# Patient Record
Sex: FEMALE | Race: WHITE | Employment: OTHER | URBAN - METROPOLITAN AREA
[De-identification: names, ages, dates, MRNs, and addresses within clinical notes are randomized per-mention and may not be internally consistent; named-entity substitution may affect disease eponyms.]

---

## 2017-02-22 ENCOUNTER — HOSPITAL ENCOUNTER (OUTPATIENT)
Dept: CT IMAGING | Age: 62
Discharge: HOME | End: 2017-02-22
Attending: FAMILY MEDICINE
Payer: MEDICARE

## 2017-02-22 DIAGNOSIS — R91.8 PULMONARY NODULES: ICD-10-CM

## 2017-02-22 PROCEDURE — 71250 CT THORAX DX C-: CPT

## 2017-02-22 NOTE — PROGRESS NOTES
CT scan was negative in terms of concern of cancer there is some tiny lung nodules they are unchanged    In the right middle lobe her lung is compressed, we could give her 1 of our [employer] devices that might help to open up that lung tissue; I have some of those in my cubby    Her liver and spleen are enlarged and that typically comes from liver disease we can discuss that further at her next visit; I do not think they mentioned it on the previous scans but the scan we are doing is of the lungs and not the stomach therefore they just commented on what part they could see; the main thing on her part right now would be to avoid alcohol which I think she does already

## 2017-05-08 PROBLEM — R16.2 HEPATOSPLENOMEGALY: Chronic | Status: ACTIVE | Noted: 2017-05-08

## 2017-05-10 ENCOUNTER — HOSPITAL ENCOUNTER (OUTPATIENT)
Dept: ULTRASOUND IMAGING | Age: 62
Discharge: HOME | End: 2017-05-10
Attending: FAMILY MEDICINE
Payer: MEDICARE

## 2017-05-10 DIAGNOSIS — R16.2 HEPATOSPLENOMEGALY: Chronic | ICD-10-CM

## 2017-05-10 DIAGNOSIS — D69.6 PLATELETS DECREASED (HCC): ICD-10-CM

## 2017-05-10 PROCEDURE — 76700 US EXAM ABDOM COMPLETE: CPT

## 2017-05-10 NOTE — PROGRESS NOTES
Her liver and spleen are mildly enlarged and her liver show signs of fatty liver so for right now no changes in her medication or other we will monitor

## 2017-11-09 PROBLEM — E11.9 CONTROLLED TYPE 2 DIABETES MELLITUS WITHOUT COMPLICATION, WITHOUT LONG-TERM CURRENT USE OF INSULIN (HCC): Status: ACTIVE | Noted: 2017-11-09

## 2018-02-12 PROBLEM — E11.40 TYPE 2 DIABETES MELLITUS WITH DIABETIC NEUROPATHY (HCC): Status: ACTIVE | Noted: 2018-02-12

## 2018-02-12 PROBLEM — E11.21 TYPE 2 DIABETES WITH NEPHROPATHY (HCC): Status: ACTIVE | Noted: 2018-02-12

## 2019-01-09 ENCOUNTER — HOSPITAL ENCOUNTER (OUTPATIENT)
Dept: MAMMOGRAPHY | Age: 64
Discharge: HOME | End: 2019-01-09
Attending: FAMILY MEDICINE
Payer: MEDICARE

## 2019-01-09 DIAGNOSIS — Z12.39 SCREENING BREAST EXAMINATION: ICD-10-CM

## 2019-01-09 PROCEDURE — 77067 SCR MAMMO BI INCL CAD: CPT

## 2021-07-15 ENCOUNTER — APPOINTMENT (OUTPATIENT)
Dept: CT IMAGING | Age: 66
DRG: 193 | End: 2021-07-15
Attending: EMERGENCY MEDICINE
Payer: MEDICARE

## 2021-07-15 ENCOUNTER — HOSPITAL ENCOUNTER (INPATIENT)
Age: 66
LOS: 2 days | Discharge: HOME HEALTH | DRG: 193 | End: 2021-07-17
Attending: EMERGENCY MEDICINE | Admitting: EMERGENCY MEDICINE
Payer: MEDICARE

## 2021-07-15 ENCOUNTER — APPOINTMENT (OUTPATIENT)
Dept: GENERAL RADIOLOGY | Age: 66
DRG: 193 | End: 2021-07-15
Attending: EMERGENCY MEDICINE
Payer: MEDICARE

## 2021-07-15 ENCOUNTER — APPOINTMENT (OUTPATIENT)
Dept: GENERAL RADIOLOGY | Age: 66
DRG: 193 | End: 2021-07-15
Attending: STUDENT IN AN ORGANIZED HEALTH CARE EDUCATION/TRAINING PROGRAM
Payer: MEDICARE

## 2021-07-15 DIAGNOSIS — J96.01 ACUTE RESPIRATORY FAILURE WITH HYPOXIA (HCC): Primary | ICD-10-CM

## 2021-07-15 DIAGNOSIS — R29.6 FREQUENT FALLS: ICD-10-CM

## 2021-07-15 DIAGNOSIS — J18.9 PNEUMONIA OF RIGHT LOWER LOBE DUE TO INFECTIOUS ORGANISM: ICD-10-CM

## 2021-07-15 DIAGNOSIS — E87.6 HYPOKALEMIA: ICD-10-CM

## 2021-07-15 DIAGNOSIS — T79.6XXA TRAUMATIC RHABDOMYOLYSIS, INITIAL ENCOUNTER (HCC): ICD-10-CM

## 2021-07-15 PROBLEM — M62.82 NON-TRAUMATIC RHABDOMYOLYSIS: Status: ACTIVE | Noted: 2021-07-15

## 2021-07-15 PROBLEM — R09.02 HYPOXIA: Status: ACTIVE | Noted: 2021-07-15

## 2021-07-15 PROBLEM — L03.115 BILATERAL LOWER LEG CELLULITIS: Chronic | Status: ACTIVE | Noted: 2021-07-15

## 2021-07-15 PROBLEM — L03.116 BILATERAL LOWER LEG CELLULITIS: Chronic | Status: ACTIVE | Noted: 2021-07-15

## 2021-07-15 LAB
ALBUMIN SERPL-MCNC: 2.7 G/DL (ref 3.2–4.6)
ALBUMIN/GLOB SERPL: 0.7 {RATIO} (ref 1.2–3.5)
ALP SERPL-CCNC: 128 U/L (ref 50–130)
ALT SERPL-CCNC: 36 U/L (ref 12–65)
ANION GAP SERPL CALC-SCNC: 7 MMOL/L (ref 7–16)
APPEARANCE UR: CLEAR
AST SERPL-CCNC: 69 U/L (ref 15–37)
BACTERIA URNS QL MICRO: 0 /HPF
BASOPHILS # BLD: 0 K/UL (ref 0–0.2)
BASOPHILS NFR BLD: 1 % (ref 0–2)
BILIRUB SERPL-MCNC: 1 MG/DL (ref 0.2–1.1)
BILIRUB UR QL: ABNORMAL
BUN SERPL-MCNC: 11 MG/DL (ref 8–23)
CALCIUM SERPL-MCNC: 8.7 MG/DL (ref 8.3–10.4)
CASTS URNS QL MICRO: ABNORMAL /LPF
CHLORIDE SERPL-SCNC: 104 MMOL/L (ref 98–107)
CK SERPL-CCNC: 890 U/L (ref 21–215)
CO2 SERPL-SCNC: 32 MMOL/L (ref 21–32)
COLOR UR: ABNORMAL
COVID-19 RAPID TEST, COVR: NOT DETECTED
CREAT SERPL-MCNC: 0.64 MG/DL (ref 0.6–1)
CRYSTALS URNS QL MICRO: ABNORMAL /LPF
DIFFERENTIAL METHOD BLD: ABNORMAL
EOSINOPHIL # BLD: 0.2 K/UL (ref 0–0.8)
EOSINOPHIL NFR BLD: 3 % (ref 0.5–7.8)
EPI CELLS #/AREA URNS HPF: ABNORMAL /HPF
ERYTHROCYTE [DISTWIDTH] IN BLOOD BY AUTOMATED COUNT: 19.9 % (ref 11.9–14.6)
EST. AVERAGE GLUCOSE BLD GHB EST-MCNC: 97 MG/DL
GLOBULIN SER CALC-MCNC: 3.8 G/DL (ref 2.3–3.5)
GLUCOSE SERPL-MCNC: 112 MG/DL (ref 65–100)
GLUCOSE UR STRIP.AUTO-MCNC: NEGATIVE MG/DL
HBA1C MFR BLD: 5 % (ref 4.2–6.3)
HCT VFR BLD AUTO: 36.5 % (ref 35.8–46.3)
HGB BLD-MCNC: 10.9 G/DL (ref 11.7–15.4)
HGB UR QL STRIP: NEGATIVE
IMM GRANULOCYTES # BLD AUTO: 0 K/UL (ref 0–0.5)
IMM GRANULOCYTES NFR BLD AUTO: 0 % (ref 0–5)
KETONES UR QL STRIP.AUTO: ABNORMAL MG/DL
LACTATE SERPL-SCNC: 1.6 MMOL/L (ref 0.4–2)
LACTATE SERPL-SCNC: 2.1 MMOL/L (ref 0.4–2)
LEUKOCYTE ESTERASE UR QL STRIP.AUTO: NEGATIVE
LYMPHOCYTES # BLD: 1 K/UL (ref 0.5–4.6)
LYMPHOCYTES NFR BLD: 19 % (ref 13–44)
MAGNESIUM SERPL-MCNC: 2.1 MG/DL (ref 1.8–2.4)
MCH RBC QN AUTO: 27 PG (ref 26.1–32.9)
MCHC RBC AUTO-ENTMCNC: 29.9 G/DL (ref 31.4–35)
MCV RBC AUTO: 90.3 FL (ref 79.6–97.8)
MONOCYTES # BLD: 0.5 K/UL (ref 0.1–1.3)
MONOCYTES NFR BLD: 9 % (ref 4–12)
NEUTS SEG # BLD: 3.6 K/UL (ref 1.7–8.2)
NEUTS SEG NFR BLD: 68 % (ref 43–78)
NITRITE UR QL STRIP.AUTO: NEGATIVE
NRBC # BLD: 0 K/UL (ref 0–0.2)
OTHER OBSERVATIONS,UCOM: ABNORMAL
PH UR STRIP: 6 [PH] (ref 5–9)
PLATELET # BLD AUTO: 174 K/UL (ref 150–450)
PMV BLD AUTO: 9.9 FL (ref 9.4–12.3)
POTASSIUM SERPL-SCNC: 2.9 MMOL/L (ref 3.5–5.1)
PROT SERPL-MCNC: 6.5 G/DL (ref 6.3–8.2)
PROT UR STRIP-MCNC: ABNORMAL MG/DL
RBC # BLD AUTO: 4.04 M/UL (ref 4.05–5.2)
RBC #/AREA URNS HPF: ABNORMAL /HPF
SARS-COV-2, COV2: NORMAL
SODIUM SERPL-SCNC: 143 MMOL/L (ref 136–145)
SOURCE, COVRS: NORMAL
SP GR UR REFRACTOMETRY: 1.03 (ref 1–1.02)
UROBILINOGEN UR QL STRIP.AUTO: 4 EU/DL (ref 0.2–1)
WBC # BLD AUTO: 5.3 K/UL (ref 4.3–11.1)
WBC URNS QL MICRO: ABNORMAL /HPF

## 2021-07-15 PROCEDURE — 72125 CT NECK SPINE W/O DYE: CPT

## 2021-07-15 PROCEDURE — 81003 URINALYSIS AUTO W/O SCOPE: CPT

## 2021-07-15 PROCEDURE — 74011000258 HC RX REV CODE- 258: Performed by: EMERGENCY MEDICINE

## 2021-07-15 PROCEDURE — 85025 COMPLETE CBC W/AUTO DIFF WBC: CPT

## 2021-07-15 PROCEDURE — 99285 EMERGENCY DEPT VISIT HI MDM: CPT

## 2021-07-15 PROCEDURE — 2709999900 HC NON-CHARGEABLE SUPPLY

## 2021-07-15 PROCEDURE — 96367 TX/PROPH/DG ADDL SEQ IV INF: CPT

## 2021-07-15 PROCEDURE — U0005 INFEC AGEN DETEC AMPLI PROBE: HCPCS

## 2021-07-15 PROCEDURE — 74011250637 HC RX REV CODE- 250/637: Performed by: EMERGENCY MEDICINE

## 2021-07-15 PROCEDURE — 93005 ELECTROCARDIOGRAM TRACING: CPT | Performed by: EMERGENCY MEDICINE

## 2021-07-15 PROCEDURE — 82550 ASSAY OF CK (CPK): CPT

## 2021-07-15 PROCEDURE — 80053 COMPREHEN METABOLIC PANEL: CPT

## 2021-07-15 PROCEDURE — 83735 ASSAY OF MAGNESIUM: CPT

## 2021-07-15 PROCEDURE — 74011250636 HC RX REV CODE- 250/636: Performed by: EMERGENCY MEDICINE

## 2021-07-15 PROCEDURE — 87040 BLOOD CULTURE FOR BACTERIA: CPT

## 2021-07-15 PROCEDURE — 96361 HYDRATE IV INFUSION ADD-ON: CPT

## 2021-07-15 PROCEDURE — 70450 CT HEAD/BRAIN W/O DYE: CPT

## 2021-07-15 PROCEDURE — 83036 HEMOGLOBIN GLYCOSYLATED A1C: CPT

## 2021-07-15 PROCEDURE — 96365 THER/PROPH/DIAG IV INF INIT: CPT

## 2021-07-15 PROCEDURE — 71046 X-RAY EXAM CHEST 2 VIEWS: CPT

## 2021-07-15 PROCEDURE — 87635 SARS-COV-2 COVID-19 AMP PRB: CPT

## 2021-07-15 PROCEDURE — 65270000029 HC RM PRIVATE

## 2021-07-15 PROCEDURE — 72220 X-RAY EXAM SACRUM TAILBONE: CPT

## 2021-07-15 PROCEDURE — 83605 ASSAY OF LACTIC ACID: CPT

## 2021-07-15 RX ORDER — SODIUM CHLORIDE 0.9 % (FLUSH) 0.9 %
5-40 SYRINGE (ML) INJECTION AS NEEDED
Status: DISCONTINUED | OUTPATIENT
Start: 2021-07-15 | End: 2021-07-17 | Stop reason: HOSPADM

## 2021-07-15 RX ORDER — POTASSIUM CHLORIDE 14.9 MG/ML
20 INJECTION INTRAVENOUS
Status: COMPLETED | OUTPATIENT
Start: 2021-07-16 | End: 2021-07-16

## 2021-07-15 RX ORDER — POTASSIUM CHLORIDE 20 MEQ/1
40 TABLET, EXTENDED RELEASE ORAL
Status: COMPLETED | OUTPATIENT
Start: 2021-07-15 | End: 2021-07-15

## 2021-07-15 RX ORDER — ALBUTEROL SULFATE 0.83 MG/ML
2.5 SOLUTION RESPIRATORY (INHALATION)
Status: DISCONTINUED | OUTPATIENT
Start: 2021-07-16 | End: 2021-07-16

## 2021-07-15 RX ORDER — SODIUM CHLORIDE 0.9 % (FLUSH) 0.9 %
5-40 SYRINGE (ML) INJECTION EVERY 8 HOURS
Status: DISCONTINUED | OUTPATIENT
Start: 2021-07-16 | End: 2021-07-17 | Stop reason: HOSPADM

## 2021-07-15 RX ORDER — GABAPENTIN 300 MG/1
600 CAPSULE ORAL
Status: DISCONTINUED | OUTPATIENT
Start: 2021-07-16 | End: 2021-07-17 | Stop reason: HOSPADM

## 2021-07-15 RX ORDER — POTASSIUM CHLORIDE 750 MG/1
10 TABLET, EXTENDED RELEASE ORAL DAILY
Status: DISCONTINUED | OUTPATIENT
Start: 2021-07-16 | End: 2021-07-17 | Stop reason: HOSPADM

## 2021-07-15 RX ORDER — SODIUM CHLORIDE 9 MG/ML
100 INJECTION, SOLUTION INTRAVENOUS CONTINUOUS
Status: DISPENSED | OUTPATIENT
Start: 2021-07-16 | End: 2021-07-16

## 2021-07-15 RX ORDER — FERROUS SULFATE, DRIED 160(50) MG
1 TABLET, EXTENDED RELEASE ORAL DAILY
Status: DISCONTINUED | OUTPATIENT
Start: 2021-07-16 | End: 2021-07-17 | Stop reason: HOSPADM

## 2021-07-15 RX ORDER — ALPRAZOLAM 0.5 MG/1
2 TABLET ORAL
Status: DISCONTINUED | OUTPATIENT
Start: 2021-07-15 | End: 2021-07-17 | Stop reason: HOSPADM

## 2021-07-15 RX ORDER — INSULIN LISPRO 100 [IU]/ML
INJECTION, SOLUTION INTRAVENOUS; SUBCUTANEOUS
Status: DISCONTINUED | OUTPATIENT
Start: 2021-07-16 | End: 2021-07-17 | Stop reason: HOSPADM

## 2021-07-15 RX ORDER — ENOXAPARIN SODIUM 100 MG/ML
40 INJECTION SUBCUTANEOUS EVERY 12 HOURS
Status: DISCONTINUED | OUTPATIENT
Start: 2021-07-16 | End: 2021-07-17 | Stop reason: HOSPADM

## 2021-07-15 RX ORDER — LATANOPROST 50 UG/ML
1 SOLUTION/ DROPS OPHTHALMIC EVERY EVENING
Status: DISCONTINUED | OUTPATIENT
Start: 2021-07-16 | End: 2021-07-17 | Stop reason: HOSPADM

## 2021-07-15 RX ORDER — DULOXETIN HYDROCHLORIDE 60 MG/1
60 CAPSULE, DELAYED RELEASE ORAL DAILY
Status: DISCONTINUED | OUTPATIENT
Start: 2021-07-16 | End: 2021-07-17 | Stop reason: HOSPADM

## 2021-07-15 RX ORDER — BUPROPION HYDROCHLORIDE 150 MG/1
150 TABLET, EXTENDED RELEASE ORAL 2 TIMES DAILY
Status: DISCONTINUED | OUTPATIENT
Start: 2021-07-16 | End: 2021-07-17 | Stop reason: HOSPADM

## 2021-07-15 RX ORDER — ASPIRIN 81 MG/1
81 TABLET ORAL DAILY
Status: DISCONTINUED | OUTPATIENT
Start: 2021-07-16 | End: 2021-07-17 | Stop reason: HOSPADM

## 2021-07-15 RX ORDER — PREDNISONE 50 MG/1
50 TABLET ORAL DAILY
Status: DISCONTINUED | OUTPATIENT
Start: 2021-07-16 | End: 2021-07-16

## 2021-07-15 RX ORDER — HYDROCODONE BITARTRATE AND ACETAMINOPHEN 10; 325 MG/1; MG/1
2 TABLET ORAL 3 TIMES DAILY
Status: DISCONTINUED | OUTPATIENT
Start: 2021-07-16 | End: 2021-07-17 | Stop reason: HOSPADM

## 2021-07-15 RX ORDER — LANOLIN ALCOHOL/MO/W.PET/CERES
400 CREAM (GRAM) TOPICAL DAILY
Status: DISCONTINUED | OUTPATIENT
Start: 2021-07-16 | End: 2021-07-17 | Stop reason: HOSPADM

## 2021-07-15 RX ORDER — ZIPRASIDONE HYDROCHLORIDE 20 MG/1
40 CAPSULE ORAL
Status: DISCONTINUED | OUTPATIENT
Start: 2021-07-16 | End: 2021-07-17 | Stop reason: HOSPADM

## 2021-07-15 RX ORDER — PANTOPRAZOLE SODIUM 40 MG/1
40 TABLET, DELAYED RELEASE ORAL
Status: DISCONTINUED | OUTPATIENT
Start: 2021-07-16 | End: 2021-07-17 | Stop reason: HOSPADM

## 2021-07-15 RX ORDER — ALBUTEROL SULFATE 0.83 MG/ML
2.5 SOLUTION RESPIRATORY (INHALATION)
Status: DISCONTINUED | OUTPATIENT
Start: 2021-07-16 | End: 2021-07-17 | Stop reason: HOSPADM

## 2021-07-15 RX ADMIN — SODIUM CHLORIDE 1000 ML: 900 INJECTION, SOLUTION INTRAVENOUS at 18:52

## 2021-07-15 RX ADMIN — ATORVASTATIN CALCIUM: 10 TABLET, FILM COATED ORAL at 23:49

## 2021-07-15 RX ADMIN — GABAPENTIN 600 MG: 300 CAPSULE ORAL at 23:49

## 2021-07-15 RX ADMIN — POTASSIUM CHLORIDE 40 MEQ: 1500 TABLET, EXTENDED RELEASE ORAL at 19:25

## 2021-07-15 RX ADMIN — Medication 10 ML: at 23:50

## 2021-07-15 RX ADMIN — SODIUM CHLORIDE 100 ML/HR: 900 INJECTION, SOLUTION INTRAVENOUS at 23:50

## 2021-07-15 RX ADMIN — POTASSIUM CHLORIDE 20 MEQ: 14.9 INJECTION, SOLUTION INTRAVENOUS at 23:51

## 2021-07-15 RX ADMIN — HYDROCODONE BITARTRATE AND ACETAMINOPHEN 2 TABLET: 10; 325 TABLET ORAL at 23:49

## 2021-07-15 RX ADMIN — CEFEPIME HYDROCHLORIDE 1 G: 1 INJECTION, POWDER, FOR SOLUTION INTRAMUSCULAR; INTRAVENOUS at 21:11

## 2021-07-15 RX ADMIN — AZITHROMYCIN MONOHYDRATE 500 MG: 500 INJECTION, POWDER, LYOPHILIZED, FOR SOLUTION INTRAVENOUS at 18:52

## 2021-07-15 NOTE — ED PROVIDER NOTES
[address] Marek Sheth is a 77 y.o. female seen on 7/16/2021 in the [address] in room 359/01. Chief Complaint   Patient presents with    Fatigue    Fall     HPI: 67-year-old  female presented emergency department via EMS after sustaining a fall today. Patient has been having multiple falls recently. Today she states that she was in the kitchen when her legs just got weak and she fell to the ground. She states that her only injury from her fall was that she has pain in her buttocks. Patient was feeling weak however and she could not get herself up. Patient laid on the ground for approximately 8 hours prior to being found on the ground and helped up. Per EMS, patient was hypoxic as well. Patient wears 1.5 L of oxygen supplementation at nighttime only. She does have a history of COPD. Patient was hypoxic on her normal 1.5 L of oxygen upon arrival at 87%. Patient requiring 3 L of oxygen in the emergency department. She complains of mild shortness of breath and cough. She denies any fevers, chills, abdominal pain, chest pain, changes in bowel or bladder habits. She states that she just feels weak and she falls. She does not get lightheaded or dizzy. She does not have any focal weakness. She complains of generalized weakness. There is no other complaints at this time. Patient's family believes that patient's unsteady gait is likely secondary to being overmedicated with opiates. Historian: Patient    REVIEW OF SYSTEMS     Review of Systems   Constitutional: Positive for fatigue. HENT: Negative. Respiratory: Positive for cough and shortness of breath. Cardiovascular: Negative. Gastrointestinal: Negative. Genitourinary: Negative. Musculoskeletal: Positive for back pain and gait problem. Skin: Negative. Neurological: Positive for weakness (Generalized ). Psychiatric/Behavioral: Negative.     All other systems reviewed and are negative. PAST MEDICAL HISTORY     Past Medical History:   Diagnosis Date    Abnormal glucose level     Anxiety     Back pain     Chronic pain disorder     COPD (chronic obstructive pulmonary disease) (HCC)     Depression     Elevated blood sugar     Elevated IOP     Essential hypertension, benign     Fatigue     Fibromyalgia     Fracture of distal fibula     GERD (gastroesophageal reflux disease)     Ground glass opacity present on imaging of lung     Hyperlipidemia     Hypokalemia     Menopausal problem     Metabolic syndrome     Nausea     Obesity     Osteopenia     URI (upper respiratory infection)      Past Surgical History:   Procedure Laterality Date    COLONOSCOPY N/A 10/13/2016    COLONOSCOPY performed by [facility]ika [facility] at [facility] ENDOSCOPY    HX CHOLECYSTECTOMY      HX HYSTERECTOMY      HX TONSILLECTOMY       Social History     Socioeconomic History    Marital status:      Spouse name: Not on file    Number of children: Not on file    Years of education: Not on file    Highest education level: Not on file   Tobacco Use    Smoking status: Former Smoker     Packs/day: 3.00     Years: 40.00     Pack years: 120.00     Quit date: 2015     Years since quittin.5    Smokeless tobacco: Never Used   Substance and Sexual Activity    Alcohol use: No    Drug use: No    Sexual activity: Not Currently     Social Determinants of Health     Financial Resource Strain:     Difficulty of Paying Living Expenses:    Food Insecurity:     Worried About Running Out of Food in the Last Year:     Ran Out of Food in the Last Year:    Transportation Needs:     Lack of Transportation (Medical):      Lack of Transportation (Non-Medical):    Physical Activity:     Days of Exercise per Week:     Minutes of Exercise per Session:    Stress:     Feeling of Stress :    Social Connections:     Frequency of Communication with Friends and Family:     Frequency of Social Gatherings with Friends and Family:     Attends [employer] Services:     Active Member of Clubs or Organizations:     Attends Club or Organization Meetings:     Marital Status:      Prior to Admission Medications   Prescriptions Last Dose Informant Patient Reported? Taking? ALPRAZolam (XANAX) 2 mg tablet   No No   Sig: Take 1 Tab by mouth two (2) times daily as needed for Anxiety. Max Daily Amount: 4 mg. Blood-Glucose Meter (ACCU-CHEK LASHELL PLUS METER) misc   No No   Sig: Use meter two times daily to check blood sugars. DX: E11.9 Diabetes   DULoxetine (CYMBALTA) 60 mg capsule   No No   Sig: Take 1 Cap by mouth daily. Take 1 cap PO at bedtime    Indications: FIBROMYALGIA   HYDROcodone-acetaminophen (NORCO)  mg tablet   No No   Sig: Take 2 Tabs by mouth three (3) times daily. Max Daily Amount: 6 Tabs. Lancets (ACCU-CHEK SOFTCLIX LANCETS) misc   No No   Si lancet two times daily. DX: E11.9 Diabetes   albuterol (VENTOLIN HFA) 90 mcg/actuation inhaler   No No   Sig: Take 2 Puffs by inhalation every four (4) hours as needed for Wheezing. amoxicillin-clavulanate (AUGMENTIN) 875-125 mg per tablet   No No   Sig: Take 1 Tab by mouth every twelve (12) hours. aspirin delayed-release 81 mg tablet   Yes No   Sig: Take 81 mg by mouth daily. buPROPion SR (WELLBUTRIN SR) 150 mg SR tablet   No No   Sig: Take 1 Tab by mouth two (2) times a day. calcium citrate-vitamin D3 (CITRACAL + D) tablet   Yes No   Sig: Take 2 Tabs by mouth nightly.   ezetimibe-simvastatin (VYTORIN 10/20) 10-20 mg per tablet   No No   Sig: Take 1 Tab by mouth nightly. Indications: to lower cholesterol   gabapentin (NEURONTIN) 600 mg tablet   No No   Sig: Take 1 Tab by mouth nightly. Indications: NEUROPATHIC PAIN   glucose blood VI test strips (ACCU-CHEK LASHELL PLUS TEST STRP) strip   No No   Si strip two times daily.  DX E11.9 Diabetes   losartan-hydroCHLOROthiazide (HYZAAR) 50-12.5 mg per tablet   No No   Sig: Take 1 Tab by mouth daily. Indications: hypertension, To lower blood pressure / Fluid pill   magnesium oxide 500 mg tab   Yes No   Sig: Take  by mouth.   miSOPROStol (CYTOTEC) 200 mcg tablet   No No   Sig: Take 1 Tab by mouth two (2) times a day. To coat stomach.   mv,Ca,min-folic acid-vit K1 (ONE-A-DAY WOMEN'S 50 PLUS) 400-20 mcg tab   No No   Sig: Take 1 Tab by mouth daily. naloxone (NARCAN) 4 mg/actuation nasal spray   No No   Si [place] by IntraNASal route once as needed for up to 2 doses. As directed; may repeat every 2-3 mins until ems arrives or patient responds  Indications: OPIATE-INDUCED RESPIRATORY DEPRESSION, If stops breathing from too much pain meds   omeprazole (PRILOSEC) 40 mg capsule   No No   Sig: Take 1 Cap by mouth daily. 30 min before breakfast  Indications: HEARTBURN, To lower stomach acid   potassium chloride (KLOR-CON M10) 10 mEq tablet   No No   Sig: Take 1 Tab by mouth daily. promethazine (PHENERGAN) 25 mg tablet   No No   Sig: Take 1 Tab by mouth every six (6) hours as needed for Nausea. travoprost (TRAVATAN Z) 0.004 % ophthalmic solution   Yes No   Sig: Administer 1 Drop to both eyes every evening. umeclidinium-vilanterol (ANORO ELLIPTA) 62.5-25 mcg/actuation inhaler   No No   Sig: Take 1 Puff by inhalation daily. ziprasidone (GEODON) 20 mg capsule   No No   Sig: Take 2 Caps by mouth nightly. Indications: Depression      Facility-Administered Medications: None     No Known Allergies     PHYSICAL EXAM       Vitals:    07/15/21 2030 07/15/21 2045 07/15/21 2100 07/15/21 2255   BP: (!) 153/72 (!) 148/70 (!) 154/68 (!) 158/68   Pulse: 85 81 80 83   Resp: 15 13  14   Temp:    99 °F (37.2 °C)   SpO2: 96% 94% 95% 96%    Vital signs were reviewed. Physical Exam  Vitals and nursing note reviewed. Constitutional:       General: She is not in acute distress. Appearance: She is not toxic-appearing. HENT:      Head: Normocephalic and atraumatic.       Mouth/Throat:      Mouth: Mucous membranes are dry.   Eyes:      Extraocular Movements: Extraocular movements intact. Pupils: Pupils are equal, round, and reactive to light. Cardiovascular:      Rate and Rhythm: Normal rate and regular rhythm. Pulses: Normal pulses. Heart sounds: Normal heart sounds. Pulmonary:      Breath sounds: Wheezing and rhonchi present. Abdominal:      Palpations: Abdomen is soft. Tenderness: There is no abdominal tenderness. There is no guarding or rebound. Musculoskeletal:         General: Normal range of motion. Cervical back: Normal range of motion. Comments: Tenderness palpation over the coccyx   Skin:     General: Skin is warm and dry. Neurological:      General: No focal deficit present. Mental Status: She is alert and oriented to person, place, and time. Psychiatric:         Mood and Affect: Mood normal.         Behavior: Behavior normal.         Thought Content:  Thought content normal.         Judgment: Judgment normal.          MEDICAL DECISION MAKING     ED Course:    Orders Placed This Encounter    CULTURE, BLOOD    COVID-19 RAPID TEST    SARS-COV-2, PCR    XR CHEST PA LAT    CT HEAD WO CONT    CT SPINE CERV WO CONT    XR SACRUM AND COCCYX    CBC WITH AUTOMATED DIFF    METABOLIC PANEL,COMP.    CK    URINALYSIS    LACTIC ACID    LACTIC ACID 2 HOUR REPEAT    MAGNESIUM    HEMOGLOBIN A1C WITH EAG    METABOLIC PANEL, BASIC    CBC W/ AUTOMATED DIFF    SARS-COV-2    ADULT DIET Regular; Low Fat/Low Chol/High Fiber/NGA; GI [place] (GERD/Peptic Ulcer)    INITIATE: SPECIFY Initiate Presenting Complaint Protocol ONE TIME Routine    STRAIGHT CATHETER (NURSING) ONE TIME STAT    POC GLUCOSE    VITAL SIGNS PER UNIT ROUTINE    AMBULATE WITH ASSISTANCE    ELEVATE HEAD OF BED    NOTIFY PROVIDER: VITAL SIGNS CHANGES    INTAKE AND OUTPUT    SMOKING CESSATION EDUCATION    FULL CODE    OXIMETRY, SPOT CHECK    EKG, 12 LEAD, INITIAL    EKG, 12 LEAD, INITIAL    INSERT PERIPHERAL IV ONE TIME STAT    azithromycin (ZITHROMAX) 500 mg in 0.9% sodium chloride 250 mL (VIAL-MATE)    cefepime (MAXIPIME) 1 g in 0.9% sodium chloride (MBP/ADV) 50 mL MBP    sodium chloride 0.9 % bolus infusion 1,000 mL    potassium chloride (K-DUR, KLOR-CON) SR tablet 40 mEq    ALPRAZolam (XANAX) tablet 2 mg    aspirin delayed-release tablet 81 mg    buPROPion SR (WELLBUTRIN SR) tablet 150 mg    . PHARMACY TO SUBSTITUTE PER PROTOCOL (Reordered from: calcium citrate-vitamin D3 (CITRACAL + D) tablet)    DULoxetine (CYMBALTA) capsule 60 mg    ezetimibe/atorvastatin 10/10 mg    gabapentin (NEURONTIN) capsule 600 mg    HYDROcodone-acetaminophen (NORCO)  mg tablet 2 Tablet    DISCONTD: . PHARMACY TO SUBSTITUTE PER PROTOCOL (Reordered from: losartan-hydroCHLOROthiazide (HYZAAR) 50-12.5 mg per tablet)    magnesium oxide (MAG-OX) tablet 400 mg    multivitamin, tx-iron-ca-min (THERA-M w/ IRON) tablet 1 Tablet    pantoprazole (PROTONIX) tablet 40 mg    potassium chloride (KLOR-CON) tablet 10 mEq    latanoprost (XALATAN) 0.005 % ophthalmic solution 1 Drop    tiotropium-olodateroL (STIOLTO RESPIMAT) 2.5-2.5 mcg/actuation inhaler 2 Puff    ziprasidone (GEODON) capsule 40 mg    sodium chloride (NS) flush 5-40 mL    sodium chloride (NS) flush 5-40 mL    insulin lispro (HUMALOG) injection    potassium chloride 20 mEq in 100 ml IVPB    azithromycin (ZITHROMAX) 500 mg in 0.9% sodium chloride 250 mL (VIAL-MATE)    cefTRIAXone (ROCEPHIN) 1 g in 0.9% sodium chloride (MBP/ADV) 50 mL MBP    albuterol (PROVENTIL VENTOLIN) nebulizer solution 2.5 mg    albuterol (PROVENTIL VENTOLIN) nebulizer solution 2.5 mg    predniSONE (DELTASONE) tablet 50 mg    enoxaparin (LOVENOX) injection 40 mg    0.9% sodium chloride infusion    losartan/hydroCHLOROthiazide (HYZAAR) 50/12.5 mg    INITIAL PHYSICIAN ORDER: INPATIENT Medical; No; 3.  Patient receiving treatment that can only be provided in an inpatient setting (further clarification in H&P documentation)    IP CONSULT TO WOUND CARE     Recent Results (from the past 8 hour(s))   CBC WITH AUTOMATED DIFF    Collection Time: 07/15/21  6:10 PM   Result Value Ref Range    WBC 5.3 4.3 - 11.1 K/uL    RBC 4.04 (L) 4.05 - 5.2 M/uL    HGB 10.9 (L) 11.7 - 15.4 g/dL    HCT 36.5 35.8 - 46.3 %    MCV 90.3 79.6 - 97.8 FL    MCH 27.0 26.1 - 32.9 PG    MCHC 29.9 (L) 31.4 - 35.0 g/dL    RDW 19.9 (H) 11.9 - 14.6 %    PLATELET 160 031 - 496 K/uL    MPV 9.9 9.4 - 12.3 FL    ABSOLUTE NRBC 0.00 0.0 - 0.2 K/uL    DF AUTOMATED      NEUTROPHILS 68 43 - 78 %    LYMPHOCYTES 19 13 - 44 %    MONOCYTES 9 4.0 - 12.0 %    EOSINOPHILS 3 0.5 - 7.8 %    BASOPHILS 1 0.0 - 2.0 %    IMMATURE GRANULOCYTES 0 0.0 - 5.0 %    ABS. NEUTROPHILS 3.6 1.7 - 8.2 K/UL    ABS. LYMPHOCYTES 1.0 0.5 - 4.6 K/UL    ABS. MONOCYTES 0.5 0.1 - 1.3 K/UL    ABS. EOSINOPHILS 0.2 0.0 - 0.8 K/UL    ABS. BASOPHILS 0.0 0.0 - 0.2 K/UL    ABS. IMM. GRANS. 0.0 0.0 - 0.5 K/UL   METABOLIC PANEL, COMPREHENSIVE    Collection Time: 07/15/21  6:10 PM   Result Value Ref Range    Sodium 143 136 - 145 mmol/L    Potassium 2.9 (L) 3.5 - 5.1 mmol/L    Chloride 104 98 - 107 mmol/L    CO2 32 21 - 32 mmol/L    Anion gap 7 7 - 16 mmol/L    Glucose 112 (H) 65 - 100 mg/dL    BUN 11 8 - 23 MG/DL    Creatinine 0.64 0.6 - 1.0 MG/DL    GFR est AA >60 >60 ml/min/1.73m2    GFR est non-AA >60 >60 ml/min/1.73m2    Calcium 8.7 8.3 - 10.4 MG/DL    Bilirubin, total 1.0 0.2 - 1.1 MG/DL    ALT (SGPT) 36 12 - 65 U/L    AST (SGOT) 69 (H) 15 - 37 U/L    Alk.  phosphatase 128 50 - 130 U/L    Protein, total 6.5 6.3 - 8.2 g/dL    Albumin 2.7 (L) 3.2 - 4.6 g/dL    Globulin 3.8 (H) 2.3 - 3.5 g/dL    A-G Ratio 0.7 (L) 1.2 - 3.5     CK    Collection Time: 07/15/21  6:10 PM   Result Value Ref Range     (H) 21 - 215 U/L   LACTIC ACID    Collection Time: 07/15/21  6:10 PM   Result Value Ref Range    Lactic acid 2.1 (H) 0.4 - 2.0 MMOL/L   MAGNESIUM    Collection Time: 07/15/21 6:10 PM   Result Value Ref Range    Magnesium 2.1 1.8 - 2.4 mg/dL   HEMOGLOBIN A1C WITH EAG    Collection Time: 07/15/21  6:10 PM   Result Value Ref Range    Hemoglobin A1c 5.0 4.2 - 6.3 %    Est. average glucose 97 mg/dL   URINALYSIS W/ RFLX MICROSCOPIC    Collection Time: 07/15/21  7:40 PM   Result Value Ref Range    Color KYLE      Appearance CLEAR      Specific gravity 1.029 (H) 1.001 - 1.023      pH (UA) 6.0 5.0 - 9.0      Protein TRACE (A) NEG mg/dL    Glucose Negative mg/dL    Ketone TRACE (A) NEG mg/dL    Bilirubin SMALL (A) NEG      Blood Negative NEG      Urobilinogen 4.0 (H) 0.2 - 1.0 EU/dL    Nitrites Negative NEG      Leukocyte Esterase Negative NEG      WBC 0-3 0 /hpf    RBC 0-3 0 /hpf    Epithelial cells 0-3 0 /hpf    Bacteria 0 0 /hpf    Casts 5-10 0 /lpf    Crystals, urine MARKED 0 /LPF    Other observations RESULTS VERIFIED MANUALLY     LACTIC ACID    Collection Time: 07/15/21  7:40 PM   Result Value Ref Range    Lactic acid 1.6 0.4 - 2.0 MMOL/L   SARS-COV-2    Collection Time: 07/15/21  9:34 PM   Result Value Ref Range    SARS-CoV-2 Please find results under separate order     COVID-19 RAPID TEST    Collection Time: 07/15/21  9:34 PM   Result Value Ref Range    Specimen source Nasopharyngeal      COVID-19 rapid test Not detected NOTD       XR CHEST PA LAT    Result Date: 7/15/2021  EXAM: CHEST X-RAY, 2 VIEWS INDICATION: hypoxia COMPARISON: Chest CT 4/12/2016 TECHNIQUE: Frontal and lateral views of the chest were obtained. FINDINGS: There is a vague opacity at the right cardiophrenic angle. No pneumothorax or pleural effusion. The cardiomediastinal silhouette is within normal limits. Multilevel degenerative changes of the spine. A vague opacity at the right cardiophrenic angle, which which could reflect airspace disease such as atelectasis/infiltrate or proliferation of cardiophrenic fat. Consider CT.      XR SACRUM AND COCCYX    Result Date: 7/15/2021  Sacrum and coccyx radiographs, 3 views INDICATION: Sacral pain following injury COMPARISON: None. FINDINGS: No acute fracture is clearly discerned. The pelvic ring appears intact, where imaged. The sacroiliac joints are symmetric and demonstrate osteoarthritic changes. Degenerative changes of the lower lumbar spine. Arteriosclerotic calcifications. No acute radiographic abnormality within the limitations imposed by the patient's body habitus and osseous demineralization. CT HEAD WO CONT    Result Date: 7/15/2021  NONCONTRAST HEAD CT CLINICAL HISTORY:  Weakness and somnolence after fall this morning. TECHNIQUE:  Axial images were obtained with spiral technique. Radiation dose reduction was achieved using one or all of the following techniques: automated exposure control, weight-based dosing, iterative reconstruction. COMPARISON:  None. REPORT:   Standard noncontrast head CT demonstrates no definite intracranial mass effect, hemorrhage, or evidence of acute geographic infarction. The ventricles are normal in size and configuration, accounting for the patient's age. Orbits  and paranasal sinuses are clear where imaged. Bone windows demonstrate no definite fracture or destruction. NO ACUTE INTRACRANIAL ABNORMALITY OR CALVARIAL FRACTURE IDENTIFIED AT NONCONTRAST CT.     CT SPINE CERV WO CONT    Result Date: 7/15/2021  CT CERVICAL SPINE INDICATION: Repetitive falls COMPARISON: None. TECHNIQUE: Contiguous axial images from the skull base through the superior mediastinum were obtained with coronal and sagittal reformations. Radiation dose reduction techniques were used for this study. Our CT scanners use one or all of the following: Automated exposure control, adjustment of the mA and/or kV according to patient size, iterative reconstruction. 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 No acute fracture is clearly identified. Cervical spine alignment is normal. The craniocervical junction is intact.  The lateral masses are aligned. Vertebral body heights are preserved. Multilevel degenerative changes of the spine. Visualized soft tissues are unremarkable. No acute traumatic abnormality of the cervical spine. MDM  Number of Diagnoses or Management Options  Acute respiratory failure with hypoxia (HCC)  Frequent falls  Hypokalemia  Pneumonia of right lower lobe due to infectious organism  Traumatic rhabdomyolysis, initial encounter [facility])  Diagnosis management comments: 38-year-old female presented emergency department via EMS after being found on the ground after a fall earlier today. Patient was hypoxic despite her normal home oxygen supplementation. Patient's chest x-ray revealed right lower lobe infiltrate. Patient also noted to be hypokalemic. Patient CPK was elevated consistent with mild early rhabdomyolysis however patient's kidney function is preserved currently. Patient given IV Rocephin and azithromycin for community-acquired pneumonia. Patient will be admitted to the hospital for further treatment evaluation. Amount and/or Complexity of Data Reviewed  Clinical lab tests: ordered and reviewed  Tests in the radiology section of CPT®: ordered and reviewed  Decide to obtain previous medical records or to obtain history from someone other than the patient: yes  Obtain history from someone other than the patient: yes  Review and summarize past medical records: yes  Discuss the patient with other providers: yes  Independent visualization of images, tracings, or specimens: yes    Patient Progress  Patient progress: stable        Disposition: Admitted  Diagnosis:     ICD-10-CM ICD-9-CM   1. Acute respiratory failure with hypoxia (HCC)  J96.01 518.81   2. Pneumonia of right lower lobe due to infectious organism  J18.9 486   3. Frequent falls  R29.6 V15.88   4. [facility]  T79. 6XXA 958.6   5.  Hypokalemia  E87.6 276.8 ____________________________________________________________________  A portion of this note was generated using voice recognition dictation software. While the note has been reviewed for accuracy, please note certain words and phrases may not be transcribed as intended and some grammatical and/or typographical errors may be present.

## 2021-07-15 NOTE — ED TRIAGE NOTES
Patient comes via ems from home, pt fell in the morning around 8am and was unable to get herself up until around 4pm.  Patient co weakness, denies any neck pain denies loc

## 2021-07-16 ENCOUNTER — APPOINTMENT (OUTPATIENT)
Dept: ULTRASOUND IMAGING | Age: 66
DRG: 193 | End: 2021-07-16
Attending: INTERNAL MEDICINE
Payer: MEDICARE

## 2021-07-16 LAB
ANION GAP SERPL CALC-SCNC: 1 MMOL/L (ref 7–16)
BASOPHILS # BLD: 0 K/UL (ref 0–0.2)
BASOPHILS NFR BLD: 1 % (ref 0–2)
BUN SERPL-MCNC: 8 MG/DL (ref 8–23)
CALCIUM SERPL-MCNC: 8.5 MG/DL (ref 8.3–10.4)
CHLORIDE SERPL-SCNC: 110 MMOL/L (ref 98–107)
CO2 SERPL-SCNC: 35 MMOL/L (ref 21–32)
CREAT SERPL-MCNC: 0.44 MG/DL (ref 0.6–1)
DIFFERENTIAL METHOD BLD: ABNORMAL
EOSINOPHIL # BLD: 0.2 K/UL (ref 0–0.8)
EOSINOPHIL NFR BLD: 5 % (ref 0.5–7.8)
ERYTHROCYTE [DISTWIDTH] IN BLOOD BY AUTOMATED COUNT: 19.9 % (ref 11.9–14.6)
GLUCOSE BLD STRIP.AUTO-MCNC: 107 MG/DL (ref 65–100)
GLUCOSE BLD STRIP.AUTO-MCNC: 113 MG/DL (ref 65–100)
GLUCOSE BLD STRIP.AUTO-MCNC: 80 MG/DL (ref 65–100)
GLUCOSE BLD STRIP.AUTO-MCNC: 99 MG/DL (ref 65–100)
GLUCOSE SERPL-MCNC: 77 MG/DL (ref 65–100)
HCT VFR BLD AUTO: 34.7 % (ref 35.8–46.3)
HGB BLD-MCNC: 10.2 G/DL (ref 11.7–15.4)
IMM GRANULOCYTES # BLD AUTO: 0 K/UL (ref 0–0.5)
IMM GRANULOCYTES NFR BLD AUTO: 0 % (ref 0–5)
LYMPHOCYTES # BLD: 1.1 K/UL (ref 0.5–4.6)
LYMPHOCYTES NFR BLD: 27 % (ref 13–44)
MCH RBC QN AUTO: 26.6 PG (ref 26.1–32.9)
MCHC RBC AUTO-ENTMCNC: 29.4 G/DL (ref 31.4–35)
MCV RBC AUTO: 90.6 FL (ref 79.6–97.8)
MONOCYTES # BLD: 0.4 K/UL (ref 0.1–1.3)
MONOCYTES NFR BLD: 10 % (ref 4–12)
NEUTS SEG # BLD: 2.2 K/UL (ref 1.7–8.2)
NEUTS SEG NFR BLD: 57 % (ref 43–78)
NRBC # BLD: 0 K/UL (ref 0–0.2)
PLATELET # BLD AUTO: 156 K/UL (ref 150–450)
PMV BLD AUTO: 10.1 FL (ref 9.4–12.3)
POTASSIUM SERPL-SCNC: 3.5 MMOL/L (ref 3.5–5.1)
RBC # BLD AUTO: 3.83 M/UL (ref 4.05–5.2)
SARS-COV-2, COV2: NOT DETECTED
SERVICE CMNT-IMP: ABNORMAL
SERVICE CMNT-IMP: ABNORMAL
SERVICE CMNT-IMP: NORMAL
SERVICE CMNT-IMP: NORMAL
SODIUM SERPL-SCNC: 146 MMOL/L (ref 136–145)
SPECIMEN SOURCE, FCOV2M: NORMAL
WBC # BLD AUTO: 3.9 K/UL (ref 4.3–11.1)

## 2021-07-16 PROCEDURE — 80048 BASIC METABOLIC PNL TOTAL CA: CPT

## 2021-07-16 PROCEDURE — 74011250636 HC RX REV CODE- 250/636: Performed by: EMERGENCY MEDICINE

## 2021-07-16 PROCEDURE — 94760 N-INVAS EAR/PLS OXIMETRY 1: CPT

## 2021-07-16 PROCEDURE — 93970 EXTREMITY STUDY: CPT

## 2021-07-16 PROCEDURE — 94664 DEMO&/EVAL PT USE INHALER: CPT

## 2021-07-16 PROCEDURE — 97161 PT EVAL LOW COMPLEX 20 MIN: CPT

## 2021-07-16 PROCEDURE — 74011250637 HC RX REV CODE- 250/637: Performed by: EMERGENCY MEDICINE

## 2021-07-16 PROCEDURE — 74011000250 HC RX REV CODE- 250: Performed by: EMERGENCY MEDICINE

## 2021-07-16 PROCEDURE — 36415 COLL VENOUS BLD VENIPUNCTURE: CPT

## 2021-07-16 PROCEDURE — 2709999900 HC NON-CHARGEABLE SUPPLY

## 2021-07-16 PROCEDURE — 65270000029 HC RM PRIVATE

## 2021-07-16 PROCEDURE — 94640 AIRWAY INHALATION TREATMENT: CPT

## 2021-07-16 PROCEDURE — 82962 GLUCOSE BLOOD TEST: CPT

## 2021-07-16 PROCEDURE — 77010033678 HC OXYGEN DAILY

## 2021-07-16 PROCEDURE — 74011000258 HC RX REV CODE- 258: Performed by: EMERGENCY MEDICINE

## 2021-07-16 PROCEDURE — 97165 OT EVAL LOW COMPLEX 30 MIN: CPT

## 2021-07-16 PROCEDURE — 85025 COMPLETE CBC W/AUTO DIFF WBC: CPT

## 2021-07-16 RX ORDER — ALBUTEROL SULFATE 0.83 MG/ML
2.5 SOLUTION RESPIRATORY (INHALATION)
Status: DISCONTINUED | OUTPATIENT
Start: 2021-07-16 | End: 2021-07-16

## 2021-07-16 RX ORDER — PREDNISONE 20 MG/1
40 TABLET ORAL DAILY
Status: DISCONTINUED | OUTPATIENT
Start: 2021-07-17 | End: 2021-07-17 | Stop reason: HOSPADM

## 2021-07-16 RX ORDER — ALBUTEROL SULFATE 0.83 MG/ML
2.5 SOLUTION RESPIRATORY (INHALATION)
Status: DISCONTINUED | OUTPATIENT
Start: 2021-07-16 | End: 2021-07-17 | Stop reason: HOSPADM

## 2021-07-16 RX ADMIN — Medication 1 TABLET: at 09:09

## 2021-07-16 RX ADMIN — Medication 10 ML: at 22:20

## 2021-07-16 RX ADMIN — POTASSIUM CHLORIDE 10 MEQ: 10 TABLET, EXTENDED RELEASE ORAL at 09:01

## 2021-07-16 RX ADMIN — ALBUTEROL SULFATE 2.5 MG: 2.5 SOLUTION RESPIRATORY (INHALATION) at 01:48

## 2021-07-16 RX ADMIN — POTASSIUM CHLORIDE 20 MEQ: 14.9 INJECTION, SOLUTION INTRAVENOUS at 01:50

## 2021-07-16 RX ADMIN — HYDROCODONE BITARTRATE AND ACETAMINOPHEN 2 TABLET: 10; 325 TABLET ORAL at 18:09

## 2021-07-16 RX ADMIN — ZIPRASIDONE HYDROCHLORIDE 40 MG: 20 CAPSULE ORAL at 22:06

## 2021-07-16 RX ADMIN — BUPROPION HYDROCHLORIDE 150 MG: 150 TABLET, EXTENDED RELEASE ORAL at 21:54

## 2021-07-16 RX ADMIN — LATANOPROST 1 DROP: 50 SOLUTION OPHTHALMIC at 18:09

## 2021-07-16 RX ADMIN — AZITHROMYCIN MONOHYDRATE 500 MG: 500 INJECTION, POWDER, LYOPHILIZED, FOR SOLUTION INTRAVENOUS at 18:09

## 2021-07-16 RX ADMIN — MAGNESIUM GLUCONATE 500 MG ORAL TABLET 400 MG: 500 TABLET ORAL at 09:01

## 2021-07-16 RX ADMIN — DULOXETINE HYDROCHLORIDE 60 MG: 60 CAPSULE, DELAYED RELEASE ORAL at 09:01

## 2021-07-16 RX ADMIN — HYDROCODONE BITARTRATE AND ACETAMINOPHEN 2 TABLET: 10; 325 TABLET ORAL at 09:00

## 2021-07-16 RX ADMIN — PANTOPRAZOLE SODIUM 40 MG: 40 TABLET, DELAYED RELEASE ORAL at 06:12

## 2021-07-16 RX ADMIN — ALPRAZOLAM 2 MG: 0.5 TABLET ORAL at 22:13

## 2021-07-16 RX ADMIN — GABAPENTIN 600 MG: 300 CAPSULE ORAL at 22:08

## 2021-07-16 RX ADMIN — ENOXAPARIN SODIUM 40 MG: 40 INJECTION SUBCUTANEOUS at 21:53

## 2021-07-16 RX ADMIN — Medication 10 ML: at 14:00

## 2021-07-16 RX ADMIN — TIOTROPIUM BROMIDE AND OLODATEROL 2 PUFF: 3.124; 2.736 SPRAY, METERED RESPIRATORY (INHALATION) at 21:45

## 2021-07-16 RX ADMIN — ENOXAPARIN SODIUM 40 MG: 40 INJECTION SUBCUTANEOUS at 09:02

## 2021-07-16 RX ADMIN — ATORVASTATIN CALCIUM: 10 TABLET, FILM COATED ORAL at 22:06

## 2021-07-16 RX ADMIN — BUPROPION HYDROCHLORIDE 150 MG: 150 TABLET, EXTENDED RELEASE ORAL at 09:01

## 2021-07-16 RX ADMIN — Medication 81 MG: at 09:01

## 2021-07-16 RX ADMIN — CEFTRIAXONE 1 G: 1 INJECTION, POWDER, FOR SOLUTION INTRAMUSCULAR; INTRAVENOUS at 05:06

## 2021-07-16 RX ADMIN — HYDROCHLOROTHIAZIDE: 12.5 CAPSULE ORAL at 09:01

## 2021-07-16 NOTE — H&P
History and Physical            [employer] [facility] History and Physical       Name:  Kristy [address]  Age:65 y.o. Sex:female   :  1955    MRN:  [id number]   PCP:  Katherine Peters MD      Admit Date:  7/15/2021  5:26 PM   Chief Complaint: Weakness, falls. Reason for Admission:   No admission diagnoses are documented for this encounter. Assessment & Plan:     Community acquired pneumonia of right lower lobe of lung  Of right lower lobe pneumonia with associated hypoxia. This appears to be community-acquired. Rocephin is given in the emergency department. We will continue these antibiotics. Patient is currently on oxygen support 3 to 4 L via nasal cannula. We will continue at this point and wean as able. She normally wears half a liter at night for her COPD and obstructive sleep apnea that are both chronic problems. Will monitor closely and adjust treatment as necessary    Hypoxia  Likely secondary to right lower lobe pneumonia. Will monitor closely and adjust treatment as necessary. Continue oxygen support as needed and wean as able. Non-traumatic rhabdomyolysis  Patient lay on the floor between 6 and 8 hours today and has a mildly elevated CK in the 800-900 range. Expect this will improve with fluids and supportive care. Creatinine is not elevated at this point. Will recheck in the morning. Will monitor and adjust treatment as necessary. COPD (chronic obstructive pulmonary disease) ([address]  Patient use albuterol as needed. Patient is currently wheezing so we will order routine treatments for the next 24 hours and then adjust as necessary. Will give steroids as well for supportive care. Continue oxygen support but wean as able to normal oxygen support on 0.5 L via nasal cannula at night only.   Monitor and adjust treatment as necessary    Bilateral lower leg cellulitis  Appears to have bilateral lower leg cellulitis with a few small 2 to 3 cm bullae noted on the right lower leg.  Erythema on the right leg is circumferential but on the left leg is only mild and anterior tibial.  Has been being treated by PMD.  Currently on Levaquin. Will continue on antibiotics that are currently treating him community-acquired pneumonia, Rocephin and azithromycin. Will order wound care consult. Will monitor and adjust treatment as necessary. Anxiety  Continue alprazolam as prescribed. Will monitor and adjust treatment as necessary. Continue Wellbutrin and Cymbalta as well as Geodon. Controlled type 2 diabetes mellitus without complication, without long-term current use of insulin ([address]  Patient takes Metformin grams daily. We will continue at this point. Will monitor closely creatinine bumps or is elevated in the morning. Sliding scale insulin will be ordered due to patient also receiving steroids. Depression  Continue Wellbutrin, Cymbalta, Geodon. Monitor and adjust as needed. Hypokalemia  Received 40 mEq of KCl in the emergency department p.o. Will give 40 mEq IV overnight. Will recheck in the morning. Patient takes daily 10 mEq of potassium chloride. Will order for the morning as well. Essential hypertension, benign  Continue losartanhydrochlorothiazide. Will monitor and adjust treatment as necessary. GERD (gastroesophageal reflux disease)  Continue treatment. We will switch omeprazole to pantoprazole while in the hospital.  Will monitor and adjust treatment as necessary. Obstructive sleep apnea  Patient does not wear her CPAP. She says she does not tolerate it. She wears 0.5 L of oxygen via nasal cannula at night normally. On more oxygen than that due to hypoxia from right lower lobe pneumonia. Will monitor and adjust treatment as necessary. We will wean back towards baseline.     Disposition/Expected LOS: 3-4 nights   Diet: Diabetic Cardiac diet  VTE ppx: Lovenox  GI ppx: Pantoprazole  Code status: Full  Surrogate decision-maker: Daughter      History of Presenting Illness:     [place] is a 77 y.o. female with medical history of hypertension, hyperlipidemia, COPD that is only treated with rescue inhaler routinely, obstructive sleep apnea, obesity, anxiety, depression, who presents with complaint of falling earlier today, landing on her sacrum/coccyx with associated pain, and being unable to get herself up off the floor for 6 to 8 hours. In the emergency department she was noted to have right lower lobe pneumonia, hypoxia in the 85-88 range on 1-1/2 L via nasal cannula (she normally uses 0.5 L via nasal cannula at night only for COPD and obstructive sleep apnea). She was also found to have mild rhabdomyolysis with elevated CK but a normal creatinine. She was also found to be hypokalemic. Her only complaint at this point is that she feels fatigued and that she is still having sacrococcygeal pain. Review of Systems:  A 14 point review of systems was taken and pertinent positive as per HPI.         Past Medical History:   Diagnosis Date    Abnormal glucose level     Anxiety     Back pain     Chronic pain disorder     COPD (chronic obstructive pulmonary disease) (HCC)     Depression     Elevated blood sugar     Elevated IOP     Essential hypertension, benign     Fatigue     Fibromyalgia     Fracture of distal fibula     GERD (gastroesophageal reflux disease)     Ground glass opacity present on imaging of lung     Hyperlipidemia     Hypokalemia     Menopausal problem     Metabolic syndrome     Nausea     Obesity     Osteopenia     URI (upper respiratory infection)        Past Surgical History:   Procedure Laterality Date    COLONOSCOPY N/A 10/13/2016    COLONOSCOPY performed by Kyleigh Coon MD at [facility] ENDOSCOPY    HX CHOLECYSTECTOMY      HX HYSTERECTOMY      HX TONSILLECTOMY         Family History : reviewed  Family History   Problem Relation Age of Onset    Breast Cancer Paternal Grandmother     Other Mother aneurysm    Hypertension Mother     Cancer Paternal Aunt         brain    Cancer Paternal Uncle         mouth    Heart Disease Paternal Grandfather     Heart Attack Paternal Grandfather         Social History     Tobacco Use    Smoking status: Former Smoker     Packs/day: 3.00     Years: 40.00     Pack years: 120.00     Quit date: 2015     Years since quittin.5    Smokeless tobacco: Never Used   Substance Use Topics    Alcohol use: No       No Known Allergies    Immunization History   Administered Date(s) Administered    Influenza High Dose Vaccine PF 10/30/2015    Influenza Vaccine 2016    Influenza Vaccine (Quad) PF (>6 Mo Flulaval, Fluarix, and >3 Yrs Afluria, Fluzone [id number]) 2016    Pneumococcal Polysaccharide (PPSV-23) 2011    Tdap 2016    Zoster Vaccine, Live 2016         PTA Medications:  Current Outpatient Medications   Medication Instructions    albuterol (VENTOLIN HFA) 90 mcg/actuation inhaler 2 Puffs, Inhalation, EVERY 4 HOURS AS NEEDED    ALPRAZolam (XANAX) 2 mg, Oral, 2 TIMES DAILY AS NEEDED    amoxicillin-clavulanate (AUGMENTIN) 875-125 mg per tablet 1 Tablet, Oral, EVERY 12 HOURS    aspirin delayed-release 81 mg, Oral, DAILY    Blood-Glucose Meter (ACCU-CHEK LASHELL PLUS METER) misc Use meter two times daily to check blood sugars. DX: E11.9 Diabetes    buPROPion SR (WELLBUTRIN SR) 150 mg, Oral, 2 TIMES DAILY    calcium citrate-vitamin D3 (CITRACAL + D) tablet 2 Tablets, Oral, EVERY BEDTIME    DULoxetine (CYMBALTA) 60 mg, Oral, DAILY, Take 1 cap PO at bedtime      ezetimibe-simvastatin (VYTORIN 10/20) 10-20 mg per tablet 1 Tablet, Oral, EVERY BEDTIME    gabapentin (NEURONTIN) 600 mg, Oral, EVERY BEDTIME    glucose blood VI test strips (ACCU-CHEK LASHELL PLUS TEST STRP) strip 1 strip two times daily.  DX E11.9 Diabetes    HYDROcodone-acetaminophen (NORCO)  mg tablet 2 Tablets, Oral, 3 TIMES DAILY    Lancets (ACCU-CHEK SOFTCLIX LANCETS) misc 1 lancet two times daily. DX: E11.9 Diabetes    losartan-hydroCHLOROthiazide (HYZAAR) 50-12.5 mg per tablet 1 Tablet, Oral, DAILY    magnesium oxide 500 mg tab Oral    miSOPROStoL (CYTOTEC) 200 mcg, Oral, 2 TIMES DAILY, To coat stomach.  mv,Ca,min-folic acid-vit K1 (ONE-A-DAY WOMEN'S 50 PLUS) 400-20 mcg tab 1 Tablet, Oral, DAILY    naloxone (NARCAN) 4 mg/actuation nasal spray 1 Spray, IntraNASal, ONCE PRN, As directed; may repeat every 2-3 mins until ems arrives or patient responds    omeprazole (PRILOSEC) 40 mg, Oral, DAILY, 30 min before breakfast    potassium chloride (KLOR-CON M10) 10 mEq tablet 10 mEq, Oral, DAILY    promethazine (PHENERGAN) 25 mg, Oral, EVERY 6 HOURS AS NEEDED    travoprost (TRAVATAN Z) 0.004 % ophthalmic solution 1 Drop, Both Eyes, EVERY EVENING    umeclidinium-vilanterol (ANORO ELLIPTA) 62.5-25 mcg/actuation inhaler 1 Puff, Inhalation, DAILY    ziprasidone (GEODON) 40 mg, Oral, EVERY BEDTIME       Objective:     Patient Vitals for the past 24 hrs:   Temp Pulse Resp BP SpO2   07/15/21 1915  80 13 (!) 140/66 91 %   07/15/21 1900  87  135/65 91 %   07/15/21 1855  85 28 135/63 91 %   07/15/21 1714 98.6 °F (37 °C) 91 20 (!) 118/50 (!) 87 %       Oxygen Therapy  O2 Sat (%): 91 % (07/15/21 1915)  Pulse via Oximetry: 85 beats per minute (07/15/21 1915)  O2 Device: None (Room air) (07/15/21 1714)    Body mass index is 37.59 kg/m². Physical Exam:    General:  Lying semireclined on stretcher in no acute distress, speaking in full sentences. Appears mildly uncomfortable. Wearing nasal cannula at 4 L of oxygen support. Appears fatigued but alert and oriented during exam and interview. Ill-appearing but nontoxic appearing. HEENT:  Pupils equal and reactive to light and accommodation, oropharynx is clear   Neck:   Supple, no lymphadenopathy, no JVD   Lungs:  Bilateral scattered wheezing and mildly tight sounding. Mildly decreased air movement throughout.   Rhonchi noted in right lower lobe. CV:   Regular rate and rhythm with normal S1 and S2   Abdomen:  Soft, nontender, nondistended, normoactive bowel sounds   Extremities:  1-2+ pitting edema to bilateral lower extremities. Circumferential erythema with associated small 2 to 3 cm bullae to right lower leg involving middle third of lower leg on right. Anterior erythematous patch without any bullae noted to left lower leg. Not currently actively draining or weeping. Chronic in nature per daughter although she says right leg looks worse today. Neuro:  Nonfocal, A&O x3   Psych:  Normal mood and affect       Data Reviewed: I have reviewed all labs, meds, and studies. Recent Results (from the past 24 hour(s))   CBC WITH AUTOMATED DIFF    Collection Time: 07/15/21  6:10 PM   Result Value Ref Range    WBC 5.3 4.3 - 11.1 K/uL    RBC 4.04 (L) 4.05 - 5.2 M/uL    HGB 10.9 (L) 11.7 - 15.4 g/dL    HCT 36.5 35.8 - 46.3 %    MCV 90.3 79.6 - 97.8 FL    MCH 27.0 26.1 - 32.9 PG    MCHC 29.9 (L) 31.4 - 35.0 g/dL    RDW 19.9 (H) 11.9 - 14.6 %    PLATELET 562 109 - 185 K/uL    MPV 9.9 9.4 - 12.3 FL    ABSOLUTE NRBC 0.00 0.0 - 0.2 K/uL    DF AUTOMATED      NEUTROPHILS 68 43 - 78 %    LYMPHOCYTES 19 13 - 44 %    MONOCYTES 9 4.0 - 12.0 %    EOSINOPHILS 3 0.5 - 7.8 %    BASOPHILS 1 0.0 - 2.0 %    IMMATURE GRANULOCYTES 0 0.0 - 5.0 %    ABS. NEUTROPHILS 3.6 1.7 - 8.2 K/UL    ABS. LYMPHOCYTES 1.0 0.5 - 4.6 K/UL    ABS. MONOCYTES 0.5 0.1 - 1.3 K/UL    ABS. EOSINOPHILS 0.2 0.0 - 0.8 K/UL    ABS. BASOPHILS 0.0 0.0 - 0.2 K/UL    ABS. IMM.  GRANS. 0.0 0.0 - 0.5 K/UL   METABOLIC PANEL, COMPREHENSIVE    Collection Time: 07/15/21  6:10 PM   Result Value Ref Range    Sodium 143 136 - 145 mmol/L    Potassium 2.9 (L) 3.5 - 5.1 mmol/L    Chloride 104 98 - 107 mmol/L    CO2 32 21 - 32 mmol/L    Anion gap 7 7 - 16 mmol/L    Glucose 112 (H) 65 - 100 mg/dL    BUN 11 8 - 23 MG/DL    Creatinine 0.64 0.6 - 1.0 MG/DL    GFR est AA >60 >60 ml/min/1.73m2    GFR est non-AA >60 >60 ml/min/1.73m2    Calcium 8.7 8.3 - 10.4 MG/DL    Bilirubin, total 1.0 0.2 - 1.1 MG/DL    ALT (SGPT) 36 12 - 65 U/L    AST (SGOT) 69 (H) 15 - 37 U/L    Alk.  phosphatase 128 50 - 130 U/L    Protein, total 6.5 6.3 - 8.2 g/dL    Albumin 2.7 (L) 3.2 - 4.6 g/dL    Globulin 3.8 (H) 2.3 - 3.5 g/dL    A-G Ratio 0.7 (L) 1.2 - 3.5     CK    Collection Time: 07/15/21  6:10 PM   Result Value Ref Range     (H) 21 - 215 U/L   LACTIC ACID    Collection Time: 07/15/21  6:10 PM   Result Value Ref Range    Lactic acid 2.1 (H) 0.4 - 2.0 MMOL/L   MAGNESIUM    Collection Time: 07/15/21  6:10 PM   Result Value Ref Range    Magnesium 2.1 1.8 - 2.4 mg/dL   URINALYSIS W/ RFLX MICROSCOPIC    Collection Time: 07/15/21  7:40 PM   Result Value Ref Range    Color KYLE      Appearance CLEAR      Specific gravity 1.029 (H) 1.001 - 1.023      pH (UA) 6.0 5.0 - 9.0      Protein TRACE (A) NEG mg/dL    Glucose Negative mg/dL    Ketone TRACE (A) NEG mg/dL    Bilirubin SMALL (A) NEG      Blood Negative NEG      Urobilinogen 4.0 (H) 0.2 - 1.0 EU/dL    Nitrites Negative NEG      Leukocyte Esterase Negative NEG      WBC 0-3 0 /hpf    RBC 0-3 0 /hpf    Epithelial cells 0-3 0 /hpf    Bacteria 0 0 /hpf    Casts 5-10 0 /lpf    Crystals, urine MARKED 0 /LPF    Other observations RESULTS VERIFIED MANUALLY     LACTIC ACID    Collection Time: 07/15/21  7:40 PM   Result Value Ref Range    Lactic acid 1.6 0.4 - 2.0 MMOL/L       EKG Results     Procedure 720 Value Units Date/Time    EKG, 12 LEAD, INITIAL [[id number]]     Order Status: Completed           All Micro Results     Procedure Component Value Units Date/Time    CULTURE, BLOOD [[id number]] Collected: 07/15/21 1810    Order Status: Completed Specimen: Blood Updated: 07/15/21 1824    CULTURE, BLOOD [[id number]]     Order Status: Sent Specimen: Blood           Other Studies:  XR CHEST PA LAT    Result Date: 7/15/2021  EXAM: CHEST X-RAY, 2 VIEWS INDICATION: hypoxia COMPARISON: Chest CT 4/12/2016 TECHNIQUE: Frontal and lateral views of the chest were obtained. FINDINGS: There is a vague opacity at the right cardiophrenic angle. No pneumothorax or pleural effusion. The cardiomediastinal silhouette is within normal limits. Multilevel degenerative changes of the spine. A vague opacity at the right cardiophrenic angle, which which could reflect airspace disease such as atelectasis/infiltrate or proliferation of cardiophrenic fat. Consider CT. XR SACRUM AND COCCYX    Result Date: 7/15/2021  Sacrum and coccyx radiographs, 3 views INDICATION: Sacral pain following injury COMPARISON: None. FINDINGS: No acute fracture is clearly discerned. The pelvic ring appears intact, where imaged. The sacroiliac joints are symmetric and demonstrate osteoarthritic changes. Degenerative changes of the lower lumbar spine. Arteriosclerotic calcifications. No acute radiographic abnormality within the limitations imposed by the patient's body habitus and osseous demineralization. CT HEAD WO CONT    Result Date: 7/15/2021  NONCONTRAST HEAD CT CLINICAL HISTORY:  Weakness and somnolence after fall this morning. TECHNIQUE:  Axial images were obtained with spiral technique. Radiation dose reduction was achieved using one or all of the following techniques: automated exposure control, weight-based dosing, iterative reconstruction. COMPARISON:  None. REPORT:   Standard noncontrast head CT demonstrates no definite intracranial mass effect, hemorrhage, or evidence of acute geographic infarction. The ventricles are normal in size and configuration, accounting for the patient's age. Orbits  and paranasal sinuses are clear where imaged. Bone windows demonstrate no definite fracture or destruction. NO ACUTE INTRACRANIAL ABNORMALITY OR CALVARIAL FRACTURE IDENTIFIED AT NONCONTRAST CT.     CT SPINE CERV WO CONT    Result Date: 7/15/2021  CT CERVICAL SPINE INDICATION: Repetitive falls COMPARISON: None.  TECHNIQUE: Contiguous axial images from the skull base through the superior mediastinum were obtained with coronal and sagittal reformations. Radiation dose reduction techniques were used for this study. Our CT scanners use one or all of the following: Automated exposure control, adjustment of the mA and/or kV according to patient size, iterative reconstruction. 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 No acute fracture is clearly identified. Cervical spine alignment is normal. The craniocervical junction is intact. The lateral masses are aligned. Vertebral body heights are preserved. Multilevel degenerative changes of the spine. Visualized soft tissues are unremarkable. No acute traumatic abnormality of the cervical spine.          Medications:  Medications Administered      Medications Administered     azithromycin (ZITHROMAX) 500 mg in 0.9% sodium chloride 250 mL (VIAL-MATE)     Admin Date  07/15/2021 Action  New Bag Dose  500 mg Rate  250 mL/hr Route  IntraVENous Administered By  Tanika Cobian, MATTEO          potassium chloride (K-DUR, KLOR-CON) SR tablet 40 mEq     Admin Date  07/15/2021 Action  Given Dose  40 mEq Route  Oral Administered By  Tanika Canales, MATTEO          sodium chloride 0.9 % bolus infusion 1,000 mL     Admin Date  07/15/2021 Action  New Bag Dose  1,000 mL Route  IntraVENous Administered By  Clipboard [facility], RN                      Problem List:     Hospital Problems as of 7/15/2021 Date Reviewed: 7/15/2021        Codes Class Noted - Resolved POA    * (Principal) Community acquired pneumonia of right lower lobe of lung ICD-10-CM: J18.9  ICD-9-CM: 486  7/15/2021 - Present Yes        Hypoxia ICD-10-CM: R09.02  ICD-9-CM: 799.02  7/15/2021 - Present Yes        Non-traumatic rhabdomyolysis ICD-10-CM: M62.82  ICD-9-CM: 728.88  7/15/2021 - Present Yes        Hypokalemia ICD-10-CM: E87.6  ICD-9-CM: 276.8  7/15/2021 - Present Yes        Bilateral lower leg cellulitis (Chronic) ICD-10-CM: U84.448, A31.728  ICD-9-CM: 682.6  7/15/2021 - Present Yes        Controlled type 2 diabetes mellitus without complication, without long-term current use of insulin ([facility] ICD-10-CM: E11.9  ICD-9-CM: 250.00  11/9/2017 - Present Yes        Obstructive sleep apnea ICD-10-CM: G47.33  ICD-9-CM: 327.23  11/4/2016 - Present Yes        GERD (gastroesophageal reflux disease) ICD-10-CM: K21.9  ICD-9-CM: 530.81  Unknown - Present Yes    Overview Signed 2/6/2017  8:56 AM by Sheryle Pellet     EGD October 2016 showed an irregular Z line, and mild diffuse gastritis and there are biopsy reports under pathology--Dr. Shreyas Simpson             Hyperlipidemia ICD-10-CM: E78.5  ICD-9-CM: 272.4  Unknown - Present         Depression ICD-10-CM: F32.9  ICD-9-CM: 311  Unknown - Present Yes        Anxiety ICD-10-CM: F41.9  ICD-9-CM: 300.00  Unknown - Present Yes        Essential hypertension, benign ICD-10-CM: I10  ICD-9-CM: 401.1  Unknown - Present Yes        COPD (chronic obstructive pulmonary disease) ([facility] ICD-10-CM: J44.9  ICD-9-CM: 604  Unknown - Present Yes        Elevated IOP ICD-10-CM: H40.059  ICD-9-CM: 365.00  Unknown - Present                Signed By: Carrie Ruby MD   [employer] [facility]    July 15, 2021  4:22 PM
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
## 2021-07-16 NOTE — PROGRESS NOTES
Problem: Pneumonia: Day 1  Goal: Activity/Safety  Outcome: Progressing Towards Goal  Goal: Respiratory  Outcome: Progressing Towards Goal  Goal: *Oxygen saturation within defined limits  Outcome: Progressing Towards Goal     Problem: Falls - Risk of  Goal: *Absence of Falls  Description: Document Yefri Fall Risk and appropriate interventions in the flowsheet.   Outcome: Progressing Towards Goal  Note: Fall Risk Interventions:  Mobility Interventions: Bed/chair exit alarm         Medication Interventions: Bed/chair exit alarm         History of Falls Interventions: Bed/chair exit alarm

## 2021-07-16 NOTE — ASSESSMENT & PLAN NOTE
Patient use albuterol as needed. Patient is currently wheezing so we will order routine treatments for the next 24 hours and then adjust as necessary. Will give steroids as well for supportive care. Continue oxygen support but wean as able to normal oxygen support on 0.5 L via nasal cannula at night only.   Monitor and adjust treatment as necessary

## 2021-07-16 NOTE — WOUND CARE
Pt seen for BLE wounds documented as present on admission. Noted on RLE multiple partial thickness open and intact blisters circumferential to RLE most consistent with venous stasis disease along with staining noted to BLE. Pt states they have been on and off since February. Pt states that she went to see her primary MD and thye have tried antibiotic ointments and unna boot before but they did not work. Pt states the unna boot made her itch really bad and she had to take it off before. Pt in agreement with plan to start with a light compression to see how she handles it and will follow up next week to attempt tighter compression as tolerated. Bilat DP and PT pulses palpable. Cleansed each wound with wound cleanser and applied xeroform over them covered with ABD pad and secured from base of toes to just below the knee on BLE. Recommend daily dressing changes and elevation as tolerated. Wound team will continue to follow while in acute care setting.

## 2021-07-16 NOTE — ASSESSMENT & PLAN NOTE
Appears to have bilateral lower leg cellulitis with a few small 2 to 3 cm bullae noted on the right lower leg. Erythema on the right leg is circumferential but on the left leg is only mild and anterior tibial.  Has been being treated by PMD.  Currently on Levaquin. Will continue on antibiotics that are currently treating him community-acquired pneumonia, Rocephin and azithromycin. Will order wound care consult. Will monitor and adjust treatment as necessary.

## 2021-07-16 NOTE — ASSESSMENT & PLAN NOTE
Continue treatment. We will switch omeprazole to pantoprazole while in the hospital.  Will monitor and adjust treatment as necessary.

## 2021-07-16 NOTE — ASSESSMENT & PLAN NOTE
Received 40 mEq of KCl in the emergency department p.o. Will give 40 mEq IV overnight. Will recheck in the morning. Patient takes daily 10 mEq of potassium chloride. Will order for the morning as well.

## 2021-07-16 NOTE — PROGRESS NOTES
07/16/21 0003   Dual Skin Pressure Injury Assessment   Dual Skin Pressure Injury Assessment X   Second Care Provider (Based on [address] [place] [address]) MATTEO Dumont   Skin Integumentary   Skin Integumentary (WDL) X   Skin Color Appropriate for ethnicity; Red;Ecchymosis (comment)  (bruise to right upper back)   Skin Condition/Temp Dry;Warm;Inflamed   Skin Integrity Abrasion  (left hip, right hand)

## 2021-07-16 NOTE — PROGRESS NOTES
Problem: Self Care Deficits Care Plan (Adult)  Goal: *Acute Goals and Plan of Care (Insert Text)  Outcome: Progressing Towards Goal  Note: 1. Patient will perform grooming with supervision. 2. Patient will perform Upper body dressing with supervision  3. Patient will perform lower body dressing with SBA. 4. Patient will perform upper and lower body bathing with SBA. 5. Patient will perform toilet transfers with SBA. 6. Patient will perform shower transfer with SBA. 7. Patient will participate in 30 + minutes of ADL/ therapeutic exercise/therapeutic activity with min rest breaks to increase activity tolerance for self care. 8. Patient will perform ADL functional mobility in room with SBA. Goals to be achieved in 7 days. OCCUPATIONAL THERAPY: Initial Assessment and PM 7/16/2021  INPATIENT: OT Visit Days: 1  Payor: [address] [place] [address] / Plan: [address] / Product Type: [employer] /      NAME/AGE/GENDER: Valentina Ferrara is a 77 y.o. female   PRIMARY DIAGNOSIS:  CAP (community acquired pneumonia) [J18.9]  Hypoxia [R09.02] Community acquired pneumonia of right lower lobe of lung Community acquired pneumonia of right lower lobe of lung        ICD-10: Treatment Diagnosis:    Generalized Muscle Weakness (M62.81)  Other lack of cordination (R27.8)  Difficulty in walking, Not elsewhere classified (R26.2)  Other abnormalities of gait and mobility (R26.89)  Repeated Falls (R29.6)   Precautions/Allergies:     Patient has no known allergies. ASSESSMENT:     Ms. Mia Kuo presents supine in bed. She reported she showered herself this am. She transferred oob with SBa. She ambulated with CGA sit to stand and CGA/SBA for mobility in the room. She returned to EOB. She is grossly CGA for upper and lower body ADls. She has some wounds on B LE  but could not report how she got them. She does not know why she is falling. She lives with her grandson and he works first shift. She was mod I with ADLS and mobility prior to admit. She reported she cooks as well. Recommended 24 assistance at discharge. Patient reported she plans to go home and she has a supportive neighbor to help her. She would benefit from skilled OT services. Recommend  OT and PT at discharge. Will follow. This section established at most recent assessment   PROBLEM LIST (Impairments causing functional limitations):  Decreased Strength  Decreased ADL/Functional Activities  Decreased Transfer Abilities  Decreased Ambulation Ability/Technique  Decreased Balance  Decreased Activity Tolerance  Decreased Pacing Skills  Decreased Work Simplification/Energy Conservation Techniques  Increased Shortness of Breath   INTERVENTIONS PLANNED: (Benefits and precautions of occupational therapy have been discussed with the patient.)  Activities of daily living training  Adaptive equipment training  Balance training  Clothing management  Cognitive training  Donning&doffing training  Hygiene training  Neuromuscular re-eduation  Therapeutic activity  Therapeutic exercise  Home safety     TREATMENT PLAN: Frequency/Duration: Follow patient 3 times to address above goals. Rehabilitation Potential For Stated Goals: Good     REHAB RECOMMENDATIONS (at time of discharge pending progress):    Placement: It is my opinion, based on this patient's performance to date, that Ms. Carver Olszewski may benefit from [address] after discharge due to the functional deficits listed above that are likely to improve with skilled rehabilitation because he/she has multiple medical issues that affect his/her functional mobility in the community.   Equipment:   None at this time              OCCUPATIONAL PROFILE AND HISTORY:   History of Present Injury/Illness (Reason for Referral):  See H and p  Past Medical History/Comorbidities:   Ms. Carver Olszewski  has a past medical history of Abnormal glucose level, Anxiety, Back pain, Chronic pain disorder, COPD (chronic obstructive pulmonary disease) (HCC), Depression, Elevated blood sugar, Elevated IOP, Essential hypertension, benign, Fatigue, Fibromyalgia, Fracture of distal fibula, GERD (gastroesophageal reflux disease), Ground glass opacity present on imaging of lung, Hyperlipidemia, Hypokalemia, Menopausal problem, Metabolic syndrome, Nausea, Obesity, Osteopenia, and URI (upper respiratory infection). Ms. Lorne Barriga  has a past surgical history that includes hx cholecystectomy; hx hysterectomy; hx tonsillectomy; and colonoscopy (N/A, 10/13/2016). Social History/Living Environment:   Home Environment: Private residence  # Steps to Enter: 0  One/Two Story Residence: One story  Living Alone: No  Support Systems: Family member(s) (grandson who works fist shift, supportive neighbor)  Patient Expects to be Discharged to Cor[de-identified]ration  Current DME Used/Available at [facility]: Commode, bedside, Shower chair, Garcia Hora, rolling  Tub or Shower Type: Tub/Shower combination  Prior Level of Function/Work/Activity:  Mod I  used RW, grandson lives with her     Number of Personal Factors/Comorbidities that affect the Plan of Care: Brief history (0):  LOW COMPLEXITY   ASSESSMENT OF OCCUPATIONAL PERFORMANCE[de-identified]   Activities of Daily Living:   Basic ADLs (From Assessment) Complex ADLs (From Assessment)   Feeding: Independent  Oral Facial Hygiene/Grooming: Supervision  Bathing: Contact guard assistance  Upper Body Dressing: Supervision  Lower Body Dressing: Contact guard assistance  Toileting: Contact guard assistance     Grooming/Bathing/Dressing Activities of Daily Living     Cognitive Retraining  Safety/Judgement: Awareness of environment; Fall prevention                       Bed/Mat Mobility  Supine to Sit: Stand-by assistance  Sit to Supine: Stand-by assistance  Sit to Stand: Contact guard assistance  Stand to Sit: Contact guard assistance  Scooting: Stand-by assistance     Most Recent Physical Functioning:   Gross Assessment: Posture:     Balance:  Sitting: Intact  Standing: With support Bed Mobility:  Supine to Sit: Stand-by assistance  Sit to Supine: Stand-by assistance  Scooting: Stand-by assistance  Wheelchair Mobility:     Transfers:  Sit to Stand: Contact guard assistance  Stand to Sit: Contact guard assistance            Patient Vitals for the past 6 hrs:   BP BP Patient Position SpO2 Pulse   07/16/21 1126 (!) 136/58 Sitting 98 % 77   07/16/21 1422 -- -- 90 % --       Mental Status  Neurologic State: Alert, Appropriate for age  Orientation Level: Appropriate for age  Cognition: Follows commands  Perception: Appears intact  Perseveration: No perseveration noted  Safety/Judgement: Awareness of environment, Fall prevention                          Physical Skills Involved:  Balance  Strength  Activity Tolerance  Edema  Skin Integrity Cognitive Skills Affected (resulting in the inability to perform in a timely and safe manner):  none Psychosocial Skills Affected:  Habits/Routines  Self-Awareness   Number of elements that affect the Plan of Care: 5+:  HIGH COMPLEXITY   CLINICAL DECISION MAKING:   [facility] AM-PAC 6 Clicks   Daily Activity Inpatient Short Form  How much help from another person does the patient currently need. .. Total A Lot A Little None   1. Putting on and taking off regular lower body clothing? [] 1   [] 2   [x] 3   [] 4   2. Bathing (including washing, rinsing, drying)? [] 1   [] 2   [x] 3   [] 4   3. Toileting, which includes using toilet, bedpan or urinal?   [] 1   [] 2   [x] 3   [] 4   4. Putting on and taking off regular upper body clothing? [] 1   [] 2   [] 3   [x] 4   5. Taking care of personal grooming such as brushing teeth? [] 1   [] 2   [] 3   [x] 4   6. Eating meals? [] 1   [] 2   [] 3   [x] 4   © 2007, Trustees of [facility], under license to [employer].  All rights reserved      Score:  Initial: 21 Most Recent:    Interpretation of Tool:  Represents activities that are increasingly more difficult (i.e. Bed mobility, Transfers, Gait). Medical Necessity:     · Patient is expected to demonstrate progress in   · Self care skills and functional mobility  ·     Reason for Services/Other Comments:  · Patient continues to require skilled intervention due to   · Above listed deficits     Use of outcome tool(s) and clinical judgement create a POC that gives a: LOW COMPLEXITY         TREATMENT:   (In addition to Assessment/Re-Assessment sessions the following treatments were rendered)     Pre-treatment Symptoms/Complaints:    Pain: Initial:   Pain Intensity 1: 0  Post Session:  0/10     Assessment/Reassessment only, no treatment provided today    Braces/Orthotics/Lines/Etc:   On O2 in bed  O2 Device: None (Room air) (during ambulation)  Treatment/Session Assessment:    Response to Treatment:  tolerated well  Interdisciplinary Collaboration:   Physical Therapist  Occupational Therapist  Registered Nurse    Resp therapist  After treatment position/precautions:   Supine in bed  Bed/Chair-wheels locked  Bed in low position  Call light within reach  RN notified  Side rails x 2   Compliance with Program/Exercises: Will assess as treatment progresses. Recommendations/Intent for next treatment session: \"Next visit will focus on advancements to more challenging activities and reduction in assistance provided\".   Total Treatment Duration:  OT Patient Time In/Time Out  Time In: 1410  Time Out: [address], OT

## 2021-07-16 NOTE — PROGRESS NOTES
Problem: Mobility Impaired (Adult and Pediatric)  Goal: *Acute Goals and Plan of Care (Insert Text)  Outcome: Progressing Towards Goal  Note: STG:  (1.)Ms. Gustavo Bonds will move from supine to sit and sit to supine  with  INDEPENDENCE within 4-7 treatment day(s). (2.)Ms. Gustavo Bonds will transfer from bed to chair and chair to bed with MODIFIED INDEPENDENCE using the least restrictive device within 4-7 treatment day(s). (3.)Ms. Whitaker will ambulate with STAND BY ASSIST for 150 feet with the least restrictive device within 4-7 treatment day(s). ________________________________________________________________________________________________       PHYSICAL THERAPY: Initial Assessment and PM 7/16/2021  INPATIENT: PT Visit Days : 1  Payor: [address] [place] [address] / Plan: [address] / Product Type: [employer] /       NAME/AGE/GENDER: Kathy Olivarez is a 77 y.o. female   PRIMARY DIAGNOSIS: CAP (community acquired pneumonia) [J18.9]  Hypoxia [R09.02] Community acquired pneumonia of right lower lobe of lung Community acquired pneumonia of right lower lobe of lung        ICD-10: Treatment Diagnosis:    Generalized Muscle Weakness (M62.81)  Difficulty in walking, Not elsewhere classified (R26.2)   Precaution/Allergies:  Patient has no known allergies. ASSESSMENT:     Ms. Gustavo Bonds presents with generalized weakness and decreased independence with functional mobility. She will benefit from skilled PT interventions to maximize independence with functional mobility and strengthening. She lives at home with her grandson and walks with a walker. She had a fall that brought her here and states she has actually fallen about 7 times in the last week. She does have bruises and a couple of scrapes on her legs. She moved in room with RW with minimal assist. She states she feels fine and she wants to go back to her home, would recommend HHPT for follow up.      This section established at most recent assessment   PROBLEM LIST (Impairments causing functional limitations):  Decreased Strength  Decreased ADL/Functional Activities  Decreased Transfer Abilities  Decreased Ambulation Ability/Technique  Decreased Balance  Decreased Activity Tolerance   INTERVENTIONS PLANNED: (Benefits and precautions of physical therapy have been discussed with the patient.)  Balance Exercise  Bed Mobility  Gait Training  Therapeutic Activites  Therapeutic Exercise/Strengthening  Transfer Training     TREATMENT PLAN: Frequency/Duration: daily for duration of hospital stay  Rehabilitation Potential For Stated Goals: Good     REHAB RECOMMENDATIONS (at time of discharge pending progress):    Placement: It is my opinion, based on this patient's performance to date, that Ms. Ping Eugene may benefit from [address] after discharge due to the functional deficits listed above that are likely to improve with skilled rehabilitation because he/she has multiple medical issues that affect his/her functional mobility in the community. Equipment:   None at this time              HISTORY:   History of Present Injury/Illness (Reason for Referral):  PER MD NOTE:   Past Medical Vandana Mole is a 77 y.o. female with medical history of hypertension, hyperlipidemia, COPD that is only treated with rescue inhaler routinely, obstructive sleep apnea, obesity, anxiety, depression, who presents with complaint of falling earlier today, landing on her sacrum/coccyx with associated pain, and being unable to get herself up off the floor for 6 to 8 hours. In the emergency department she was noted to have right lower lobe pneumonia, hypoxia in the 85-88 range on 1-1/2 L via nasal cannula (she normally uses 0.5 L via nasal cannula at night only for COPD and obstructive sleep apnea). She was also found to have mild rhabdomyolysis with elevated CK but a normal creatinine. She was also found to be hypokalemic.   Her only complaint at this point is that she feels fatigued and that she is still having sacrococcygeal pain.     /Comorbidities:   Ms. Tricia Avila  has a past medical history of Abnormal glucose level, Anxiety, Back pain, Chronic pain disorder, COPD (chronic obstructive pulmonary disease) ([address] Depression, Elevated blood sugar, Elevated IOP, Essential hypertension, benign, Fatigue, Fibromyalgia, Fracture of distal fibula, GERD (gastroesophageal reflux disease), Ground glass opacity present on imaging of lung, Hyperlipidemia, Hypokalemia, Menopausal problem, Metabolic syndrome, Nausea, Obesity, Osteopenia, and URI (upper respiratory infection). Ms. Tricia Avila  has a past surgical history that includes hx cholecystectomy; hx hysterectomy; hx tonsillectomy; and colonoscopy (N/A, 10/13/2016). Social History/Living Environment:   Home Environment: Private residence  # Steps to Enter: 0  One/Two Story Residence: One story  Living Alone: No  Support Systems: Family member(s) (grandson)  Patient Expects to be Discharged to[de-identified] [employer]  Current DME Used/Available at Home: Commode, bedside, Shower chair, Ana Canter, rolling  Tub or Shower Type: Tub/Shower combination  Prior Level of Function/Work/Activity:  Pt lives at home with grandson, was independent with a walker and with her ADLs, multiple falls lately     Number of Personal Factors/Comorbidities that affect the Plan of Care: 0: LOW COMPLEXITY   EXAMINATION:   Most Recent Physical Functioning:   Gross Assessment:  AROM: Generally decreased, functional  Strength: Generally decreased, functional               Posture:  Posture (WDL): Exceptions to WDL  Posture Assessment: Forward head, Rounded shoulders  Balance:  Sitting: Intact  Standing: Impaired;Pull to stand; With support  Standing - Static: Good  Standing - Dynamic : Fair Bed Mobility:  Supine to Sit: Stand-by assistance  Sit to Supine: Stand-by assistance  Scooting: Stand-by assistance  Wheelchair Mobility:     Transfers:  Sit to Stand: Contact guard assistance  Stand to Sit: Contact guard assistance  Gait:     Speed/Zari: Pace decreased (<100 feet/min)  Step Length: Left shortened;Right shortened  Gait Abnormalities: Antalgic;Decreased step clearance;Shuffling gait;Trunk sway increased  Distance (ft): 20 Feet (ft)  Assistive Device: Walker, rolling  Ambulation - Level of Assistance: Minimal assistance  Interventions: Safety awareness training;Verbal cues      Body Structures Involved:  Muscles Body Functions Affected: Movement Related Activities and Participation Affected: Mobility  Self Care   Number of elements that affect the Plan of Care: 4+: HIGH COMPLEXITY   CLINICAL PRESENTATION:   Presentation: Stable and uncomplicated: LOW COMPLEXITY   CLINICAL DECISION MAKING:   [facility] AM-PAC 6 Clicks   Basic Mobility Inpatient Short Form  How much difficulty does the patient currently have. .. Unable A Lot A Little None   1. Turning over in bed (including adjusting bedclothes, sheets and blankets)? [] 1   [] 2   [x] 3   [] 4   2. Sitting down on and standing up from a chair with arms ( e.g., wheelchair, bedside commode, etc.)   [] 1   [] 2   [x] 3   [] 4   3. Moving from lying on back to sitting on the side of the bed? [] 1   [] 2   [x] 3   [] 4   How much help from another person does the patient currently need. .. Total A Lot A Little None   4. Moving to and from a bed to a chair (including a wheelchair)? [] 1   [] 2   [x] 3   [] 4   5. Need to walk in hospital room? [] 1   [] 2   [x] 3   [] 4   6. Climbing 3-5 steps with a railing? [] 1   [] 2   [x] 3   [] 4   © 2007, Trustees of [facility], under license to [employer]. All rights reserved      Score:  Initial: 18 Most Recent: X (Date: -- )    Interpretation of Tool:  Represents activities that are increasingly more difficult (i.e. Bed mobility, Transfers, Gait).     Medical Necessity:     Patient is expected to demonstrate progress in   strength and functional technique   to   decrease assistance required with functional mobility and strengthening  . Reason for Services/Other Comments:  Patient continues to require skilled intervention due to   Inability to complete functional mobility independently  . Use of outcome tool(s) and clinical judgement create a POC that gives a: Clear prediction of patient's progress: LOW COMPLEXITY            TREATMENT:   (In addition to Assessment/Re-Assessment sessions the following treatments were rendered)   Pre-treatment Symptoms/Complaints:  none  Pain: Initial:   Pain Intensity 1: 0  Post Session:  0/10     Assessment/Reassessment only, no treatment provided today    Braces/Orthotics/Lines/Etc:   O2 Device: None (Room air) (during ambulation)  Treatment/Session Assessment:    Response to Treatment:  pt did not have any shortness of breath  Interdisciplinary Collaboration:   Occupational Therapist  Registered Nurse  After treatment position/precautions:   Supine in bed  Bed/Chair-wheels locked  Bed in low position  Call light within reach   Compliance with Program/Exercises: Will assess as treatment progresses  Recommendations/Intent for next treatment session: \"Next visit will focus on advancements to more challenging activities and reduction in assistance provided\".   Total Treatment Duration:  PT Patient Time In/Time Out  Time In: 1420  Time Out: [id number], PT

## 2021-07-16 NOTE — ASSESSMENT & PLAN NOTE
Patient lay on the floor between 6 and 8 hours today and has a mildly elevated CK in the 800-900 range. Expect this will improve with fluids and supportive care. Creatinine is not elevated at this point. Will recheck in the morning. Will monitor and adjust treatment as necessary.

## 2021-07-16 NOTE — ASSESSMENT & PLAN NOTE
Patient takes Metformin grams daily. We will continue at this point. Will monitor closely creatinine bumps or is elevated in the morning. Sliding scale insulin will be ordered due to patient also receiving steroids.

## 2021-07-16 NOTE — ASSESSMENT & PLAN NOTE
Of right lower lobe pneumonia with associated hypoxia. This appears to be community-acquired. Rocephin is given in the emergency department. We will continue these antibiotics. Patient is currently on oxygen support 3 to 4 L via nasal cannula. We will continue at this point and wean as able. She normally wears half a liter at night for her COPD and obstructive sleep apnea that are both chronic problems.   Will monitor closely and adjust treatment as necessary

## 2021-07-16 NOTE — PROGRESS NOTES
Hospitalist Progress Note     Admit Date:  7/15/2021  5:26 PM   Name:  Rio Frias   Age:  77 y.o.  :  1955   MRN:  [id number]     Presenting Complaint: Fatigue and Fall    Initial Admission Diagnosis: CAP (community acquired pneumonia) [J18.9]  Hypoxia [R09.02]     Assessment and Plan:     Hospital Problems as of 2021 Date Reviewed: 7/15/2021        Codes Class Noted - Resolved POA    * (Principal) Community acquired pneumonia of right lower lobe of lung ICD-10-CM: J18.9  ICD-9-CM: 486  7/15/2021 - Present Yes        Hypoxia ICD-10-CM: R09.02  ICD-9-CM: 799.02  7/15/2021 - Present Yes        Non-traumatic rhabdomyolysis ICD-10-CM: M62.82  ICD-9-CM: 728.88  7/15/2021 - Present Yes        Bilateral lower leg cellulitis (Chronic) ICD-10-CM: L03.116, L03.115  ICD-9-CM: 682.6  7/15/2021 - Present Yes        Hypokalemia ICD-10-CM: E87.6  ICD-9-CM: 276.8  7/15/2021 - Present Yes        CAP (community acquired pneumonia) ICD-10-CM: J18.9  ICD-9-CM: 903  7/15/2021 - Present Unknown        Controlled type 2 diabetes mellitus without complication, without long-term current use of insulin ([facility] ICD-10-CM: E11.9  ICD-9-CM: 250.00  2017 - Present Yes        Obstructive sleep apnea ICD-10-CM: G47.33  ICD-9-CM: 327.23  2016 - Present Yes        GERD (gastroesophageal reflux disease) ICD-10-CM: K21.9  ICD-9-CM: 530.81  Unknown - Present Yes    Overview Signed 2017  8:56 AM by Harshil Guy     EGD 2016 showed an irregular Z line, and mild diffuse gastritis and there are biopsy reports under pathology--Dr. Emeli Lim             Hyperlipidemia ICD-10-CM: E78.5  ICD-9-CM: 272.4  Unknown - Present         Depression ICD-10-CM: F32.9  ICD-9-CM: 311  Unknown - Present Yes        Anxiety ICD-10-CM: F41.9  ICD-9-CM: 300.00  Unknown - Present Yes        Essential hypertension, benign ICD-10-CM: I10  ICD-9-CM: 401.1  Unknown - Present Yes        COPD (chronic obstructive pulmonary disease) ([facility] ICD-10-CM: J44.9  ICD-9-CM: 131  Unknown - Present Yes        Elevated IOP ICD-10-CM: H40.059  ICD-9-CM: 365.00  Unknown - Present               Plan:  # Acute hypoxemic respiratory failure 2/2 CAP + AECOPD   - Now on RA, con't abx, prednisone, nebs    # Bilateral LE erythema   - Doubt cellulitis, already on abx either way. Will get US to r/o DVT. # Anxiety   - Home meds    # MDD   - Home meds    # GERD   -  PPI     # BELLE   - Says she only wears NC at night, not CPAP    # HTN   - Home meds    Other chronic conditions stable but add to medical complexity; continue current management. Discharge planning: Home when able, likely tomorrow. Diet:  ADULT DIET Regular; Low Fat/Low Chol/High Fiber/NGA; GI Williamson (GERD/Peptic Ulcer)  DVT ppx: [facility] Course:   Mrs. Fatoumata Tamayo is a nice 78 y/o WF with a h/o COPD (wears \"1.7L\" at night) who was admitted to our service on 7/15 with acute hypoxemic respiratory failure 2/2 CAP. She was started on supplemental oxygen and antibiotics. 24hr Events/Subjective (07/16/21):   7/16: Resting comfortably, on RA currently, SOB has improved though still some wheezes. C/o redness to both legs which she's had for a while. No chest pain, N/V/D.      No other complaints  Objective:     Patient Vitals for the past 24 hrs:   Temp Pulse Resp BP SpO2   07/16/21 1422     90 %   07/16/21 1126  77 16 (!) 136/58 98 %   07/16/21 0736 99.1 °F (37.3 °C) 80 16 (!) 105/58 94 %   07/16/21 0300 98.3 °F (36.8 °C) 80 18 119/66 94 %   07/16/21 0149     94 %   07/15/21 2255 99 °F (37.2 °C) 83 14 (!) 158/68 96 %   07/15/21 2100  80  (!) 154/68 95 %   07/15/21 2045  81 13 (!) 148/70 94 %   07/15/21 2030  85 15 (!) 153/72 96 %   07/15/21 2015  77  (!) 145/68 97 %   07/15/21 2000  73  (!) 152/72 95 %   07/15/21 1945  78  (!) 143/71 95 %   07/15/21 1930  76  (!) 160/73 91 %   07/15/21 1915  80 13 (!) 140/66 91 %   07/15/21 1900  87  135/65 91 %   07/15/21 1855  85 28 135/63 91 %   07/15/21 1714 98.6 °F (37 °C) 91 20 (!) 118/50 (!) 87 %     Oxygen Therapy  O2 Sat (%): 90 % (07/16/21 1422)  Pulse via Oximetry: 82 beats per minute (07/16/21 0149)  O2 Device: None (Room air) (during ambulation) (07/16/21 1422)  O2 Flow Rate (L/min): 2 l/min (07/16/21 0300)    Estimated body mass index is 41.2 kg/m² as calculated from the following:    Height as of this encounter: 5' 4\" (1.626 m). Weight as of this encounter: 108.9 kg (240 lb). Intake/Output Summary (Last 24 hours) at 7/16/2021 1536  Last data filed at 7/16/2021 4332  Gross per 24 hour   Intake 2071 ml   Output 300 ml   Net 1771 ml       *Note that automatically entered I/Os may not be accurate; dependent on patient compliance with collection and accurate  by [employer]. General:    Well nourished. No overt distress. Obese. On RA. CV:   RRR. No m/r/g. No edema. No JVD  Lungs:   Mild end expiratory wheezing bilaterally, on RA currently in NAD otherwise. Abdomen:   Soft, nontender, nondistended. Extremities: Warm and dry. No cyanosis. Pre-tibial erythema b/l, some in tact blisters to posterior RLE, mild pitting edema. Skin:     No rashes. Normal coloration  Neuro:  No gross focal deficits.      I have reviewed all labs, meds, and other studies shown below:  Last 24hr Labs:  Recent Results (from the past 24 hour(s))   CBC WITH AUTOMATED DIFF    Collection Time: 07/15/21  6:10 PM   Result Value Ref Range    WBC 5.3 4.3 - 11.1 K/uL    RBC 4.04 (L) 4.05 - 5.2 M/uL    HGB 10.9 (L) 11.7 - 15.4 g/dL    HCT 36.5 35.8 - 46.3 %    MCV 90.3 79.6 - 97.8 FL    MCH 27.0 26.1 - 32.9 PG    MCHC 29.9 (L) 31.4 - 35.0 g/dL    RDW 19.9 (H) 11.9 - 14.6 %    PLATELET 971 150 - 900 K/uL    MPV 9.9 9.4 - 12.3 FL    ABSOLUTE NRBC 0.00 0.0 - 0.2 K/uL    DF AUTOMATED      NEUTROPHILS 68 43 - 78 %    LYMPHOCYTES 19 13 - 44 %    MONOCYTES 9 4.0 - 12.0 %    EOSINOPHILS 3 0.5 - 7.8 %    BASOPHILS 1 0.0 - 2.0 %    IMMATURE GRANULOCYTES 0 0.0 - 5.0 % ABS. NEUTROPHILS 3.6 1.7 - 8.2 K/UL    ABS. LYMPHOCYTES 1.0 0.5 - 4.6 K/UL    ABS. MONOCYTES 0.5 0.1 - 1.3 K/UL    ABS. EOSINOPHILS 0.2 0.0 - 0.8 K/UL    ABS. BASOPHILS 0.0 0.0 - 0.2 K/UL    ABS. IMM. GRANS. 0.0 0.0 - 0.5 K/UL   METABOLIC PANEL, COMPREHENSIVE    Collection Time: 07/15/21  6:10 PM   Result Value Ref Range    Sodium 143 136 - 145 mmol/L    Potassium 2.9 (L) 3.5 - 5.1 mmol/L    Chloride 104 98 - 107 mmol/L    CO2 32 21 - 32 mmol/L    Anion gap 7 7 - 16 mmol/L    Glucose 112 (H) 65 - 100 mg/dL    BUN 11 8 - 23 MG/DL    Creatinine 0.64 0.6 - 1.0 MG/DL    GFR est AA >60 >60 ml/min/1.73m2    GFR est non-AA >60 >60 ml/min/1.73m2    Calcium 8.7 8.3 - 10.4 MG/DL    Bilirubin, total 1.0 0.2 - 1.1 MG/DL    ALT (SGPT) 36 12 - 65 U/L    AST (SGOT) 69 (H) 15 - 37 U/L    Alk.  phosphatase 128 50 - 130 U/L    Protein, total 6.5 6.3 - 8.2 g/dL    Albumin 2.7 (L) 3.2 - 4.6 g/dL    Globulin 3.8 (H) 2.3 - 3.5 g/dL    A-G Ratio 0.7 (L) 1.2 - 3.5     CK    Collection Time: 07/15/21  6:10 PM   Result Value Ref Range     (H) 21 - 215 U/L   LACTIC ACID    Collection Time: 07/15/21  6:10 PM   Result Value Ref Range    Lactic acid 2.1 (H) 0.4 - 2.0 MMOL/L   CULTURE, BLOOD    Collection Time: 07/15/21  6:10 PM    Specimen: Blood   Result Value Ref Range    Special Requests: LEFT  Antecubital        Culture result: NO GROWTH AFTER 14 HOURS     MAGNESIUM    Collection Time: 07/15/21  6:10 PM   Result Value Ref Range    Magnesium 2.1 1.8 - 2.4 mg/dL   HEMOGLOBIN A1C WITH EAG    Collection Time: 07/15/21  6:10 PM   Result Value Ref Range    Hemoglobin A1c 5.0 4.2 - 6.3 %    Est. average glucose 97 mg/dL   URINALYSIS W/ RFLX MICROSCOPIC    Collection Time: 07/15/21  7:40 PM   Result Value Ref Range    Color KYLE      Appearance CLEAR      Specific gravity 1.029 (H) 1.001 - 1.023      pH (UA) 6.0 5.0 - 9.0      Protein TRACE (A) NEG mg/dL    Glucose Negative mg/dL    Ketone TRACE (A) NEG mg/dL    Bilirubin SMALL (A) NEG Blood Negative NEG      Urobilinogen 4.0 (H) 0.2 - 1.0 EU/dL    Nitrites Negative NEG      Leukocyte Esterase Negative NEG      WBC 0-3 0 /hpf    RBC 0-3 0 /hpf    Epithelial cells 0-3 0 /hpf    Bacteria 0 0 /hpf    Casts 5-10 0 /lpf    Crystals, urine MARKED 0 /LPF    Other observations RESULTS VERIFIED MANUALLY     LACTIC ACID    Collection Time: 07/15/21  7:40 PM   Result Value Ref Range    Lactic acid 1.6 0.4 - 2.0 MMOL/L   SARS-COV-2    Collection Time: 07/15/21  9:34 PM   Result Value Ref Range    SARS-CoV-2 Please find results under separate order     COVID-19 RAPID TEST    Collection Time: 07/15/21  9:34 PM   Result Value Ref Range    Specimen source Nasopharyngeal      COVID-19 rapid test Not detected NOTD     SARS-COV-2, PCR    Collection Time: 07/15/21  9:34 PM    Specimen: Nasopharyngeal   Result Value Ref Range    Specimen source Nasopharyngeal      SARS-CoV-2 Not detected NOTD     METABOLIC PANEL, BASIC    Collection Time: 07/16/21  6:11 AM   Result Value Ref Range    Sodium 146 (H) 136 - 145 mmol/L    Potassium 3.5 3.5 - 5.1 mmol/L    Chloride 110 (H) 98 - 107 mmol/L    CO2 35 (H) 21 - 32 mmol/L    Anion gap 1 (L) 7 - 16 mmol/L    Glucose 77 65 - 100 mg/dL    BUN 8 8 - 23 MG/DL    Creatinine 0.44 (L) 0.6 - 1.0 MG/DL    GFR est AA >60 >60 ml/min/1.73m2    GFR est non-AA >60 >60 ml/min/1.73m2    Calcium 8.5 8.3 - 10.4 MG/DL   CBC WITH AUTOMATED DIFF    Collection Time: 07/16/21  6:11 AM   Result Value Ref Range    WBC 3.9 (L) 4.3 - 11.1 K/uL    RBC 3.83 (L) 4.05 - 5.2 M/uL    HGB 10.2 (L) 11.7 - 15.4 g/dL    HCT 34.7 (L) 35.8 - 46.3 %    MCV 90.6 79.6 - 97.8 FL    MCH 26.6 26.1 - 32.9 PG    MCHC 29.4 (L) 31.4 - 35.0 g/dL    RDW 19.9 (H) 11.9 - 14.6 %    PLATELET 198 308 - 240 K/uL    MPV 10.1 9.4 - 12.3 FL    ABSOLUTE NRBC 0.00 0.0 - 0.2 K/uL    DF AUTOMATED      NEUTROPHILS 57 43 - 78 %    LYMPHOCYTES 27 13 - 44 %    MONOCYTES 10 4.0 - 12.0 %    EOSINOPHILS 5 0.5 - 7.8 %    BASOPHILS 1 0.0 - 2.0 % IMMATURE GRANULOCYTES 0 0.0 - 5.0 %    ABS. NEUTROPHILS 2.2 1.7 - 8.2 K/UL    ABS. LYMPHOCYTES 1.1 0.5 - 4.6 K/UL    ABS. MONOCYTES 0.4 0.1 - 1.3 K/UL    ABS. EOSINOPHILS 0.2 0.0 - 0.8 K/UL    ABS. BASOPHILS 0.0 0.0 - 0.2 K/UL    ABS. IMM. GRANS. 0.0 0.0 - 0.5 K/UL   GLUCOSE, POC    Collection Time: 07/16/21  6:27 AM   Result Value Ref Range    Glucose (POC) 80 65 - 100 mg/dL    Performed by Dre    GLUCOSE, POC    Collection Time: 07/16/21 12:04 PM   Result Value Ref Range    Glucose (POC) 113 (H) 65 - 100 mg/dL    Performed by Byron        All Micro Results     Procedure Component Value Units Date/Time    SARS-COV-2, PCR [[id number]] Collected: 07/15/21 2134    Order Status: Completed Specimen: Nasopharyngeal Updated: 07/16/21 1151     Specimen source Nasopharyngeal        SARS-CoV-2 Not detected        Comment:      The specimen is NEGATIVE for SARS-CoV-2, the novel coronavirus associated with COVID-19. This test has been authorized by the FDA under an Emergency Use Authorization (EUA) for use by authorized laboratories. Fact sheet for Healthcare Providers: ConventionUpdate.co.nz       Fact sheet for Patients: ConventionUpdate.co.nz       Methodology: RT-PCR         CULTURE, BLOOD [[id number]] Collected: 07/15/21 1810    Order Status: Completed Specimen: Blood Updated: 07/16/21 0836     Special Requests: --        LEFT  Antecubital       Culture result: NO GROWTH AFTER 14 HOURS       COVID-19 RAPID TEST [[id number]] Collected: 07/15/21 2134    Order Status: Completed Specimen: Nasopharyngeal Updated: 07/15/21 2201     Specimen source Nasopharyngeal        COVID-19 rapid test Not detected        Comment:      The specimen is NEGATIVE for SARS-CoV-2, the novel coronavirus associated with COVID-19. A negative result does not rule out COVID-19.        This test has been authorized by the FDA under an Emergency Use Authorization (EUA) for use by authorized laboratories.         Fact sheet for Healthcare Providers: ConventionUpdate.co.nz  Fact sheet for Patients: ConventionUpdate.co.nz       Methodology: Isothermal Nucleic Acid Amplification         COVID-19 RAPID TEST [[id number]] Collected: 07/15/21 2134    Order Status: Canceled     INFLUENZA A & B AG (RAPID TEST) [[id number]]     Order Status: Canceled Specimen: Nasopharyngeal     CULTURE, BLOOD [[id number]] Collected: 07/15/21 1800    Order Status: Canceled Specimen: Blood           Current Meds:  Current Facility-Administered Medications   Medication Dose Route Frequency    albuterol (PROVENTIL VENTOLIN) nebulizer solution 2.5 mg  2.5 mg Nebulization Q6H PRN    ALPRAZolam (XANAX) tablet 2 mg  2 mg Oral BID PRN    aspirin delayed-release tablet 81 mg  81 mg Oral DAILY    buPROPion SR (WELLBUTRIN SR) tablet 150 mg  150 mg Oral BID    calcium-vitamin D (OS-EDUARDO +D3) 500 mg-200 unit per tablet 1 Tablet  1 Tablet Oral DAILY    DULoxetine (CYMBALTA) capsule 60 mg  60 mg Oral DAILY    ezetimibe/atorvastatin 10/10 mg   Oral QHS    gabapentin (NEURONTIN) capsule 600 mg  600 mg Oral QHS    HYDROcodone-acetaminophen (NORCO)  mg tablet 2 Tablet  2 Tablet Oral TID    magnesium oxide (MAG-OX) tablet 400 mg  400 mg Oral DAILY    multivitamin, tx-iron-ca-min (THERA-M w/ IRON) tablet 1 Tablet  1 Tablet Oral DAILY    pantoprazole (PROTONIX) tablet 40 mg  40 mg Oral ACB    potassium chloride (KLOR-CON) tablet 10 mEq  10 mEq Oral DAILY    latanoprost (XALATAN) 0.005 % ophthalmic solution 1 Drop  1 Drop Both Eyes QPM    tiotropium-olodateroL (STIOLTO RESPIMAT) 2.5-2.5 mcg/actuation inhaler 2 Puff  2 Puff Inhalation BID RT    ziprasidone (GEODON) capsule 40 mg  40 mg Oral QHS    sodium chloride (NS) flush 5-40 mL  5-40 mL IntraVENous Q8H    sodium chloride (NS) flush 5-40 mL  5-40 mL IntraVENous PRN    insulin lispro (HUMALOG) injection   SubCUTAneous AC&HS    azithromycin (ZITHROMAX) 500 mg in 0.9% sodium chloride 250 mL (VIAL-MATE)  500 mg IntraVENous Q24H    cefTRIAXone (ROCEPHIN) 1 g in 0.9% sodium chloride (MBP/ADV) 50 mL MBP  1 g IntraVENous Q24H    albuterol (PROVENTIL VENTOLIN) nebulizer solution 2.5 mg  2.5 mg Nebulization Q6H PRN    predniSONE (DELTASONE) tablet 50 mg  50 mg Oral DAILY    enoxaparin (LOVENOX) injection 40 mg  40 mg SubCUTAneous Q12H    losartan/hydroCHLOROthiazide (HYZAAR) 50/12.5 mg   Oral DAILY       Other Studies:    XR CHEST PA LAT    Result Date: 7/15/2021  EXAM: CHEST X-RAY, 2 VIEWS INDICATION: hypoxia COMPARISON: Chest CT 4/12/2016 TECHNIQUE: Frontal and lateral views of the chest were obtained. FINDINGS: There is a vague opacity at the right cardiophrenic angle. No pneumothorax or pleural effusion. The cardiomediastinal silhouette is within normal limits. Multilevel degenerative changes of the spine. A vague opacity at the right cardiophrenic angle, which which could reflect airspace disease such as atelectasis/infiltrate or proliferation of cardiophrenic fat. Consider CT. XR SACRUM AND COCCYX    Result Date: 7/15/2021  Sacrum and coccyx radiographs, 3 views INDICATION: Sacral pain following injury COMPARISON: None. FINDINGS: No acute fracture is clearly discerned. The pelvic ring appears intact, where imaged. The sacroiliac joints are symmetric and demonstrate osteoarthritic changes. Degenerative changes of the lower lumbar spine. Arteriosclerotic calcifications. No acute radiographic abnormality within the limitations imposed by the patient's body habitus and osseous demineralization. CT HEAD WO CONT    Result Date: 7/15/2021  NONCONTRAST HEAD CT CLINICAL HISTORY:  Weakness and somnolence after fall this morning. TECHNIQUE:  Axial images were obtained with spiral technique.   Radiation dose reduction was achieved using one or all of the following techniques: automated exposure control, weight-based dosing, iterative reconstruction. COMPARISON:  None. REPORT:   Standard noncontrast head CT demonstrates no definite intracranial mass effect, hemorrhage, or evidence of acute geographic infarction. The ventricles are normal in size and configuration, accounting for the patient's age. Orbits  and paranasal sinuses are clear where imaged. Bone windows demonstrate no definite fracture or destruction. NO ACUTE INTRACRANIAL ABNORMALITY OR CALVARIAL FRACTURE IDENTIFIED AT NONCONTRAST CT.     CT SPINE CERV WO CONT    Result Date: 7/15/2021  CT CERVICAL SPINE INDICATION: Repetitive falls COMPARISON: None. TECHNIQUE: Contiguous axial images from the skull base through the superior mediastinum were obtained with coronal and sagittal reformations. Radiation dose reduction techniques were used for this study. Our CT scanners use one or all of the following: Automated exposure control, adjustment of the mA and/or kV according to patient size, iterative reconstruction. 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 No acute fracture is clearly identified. Cervical spine alignment is normal. The craniocervical junction is intact. The lateral masses are aligned. Vertebral body heights are preserved. Multilevel degenerative changes of the spine. Visualized soft tissues are unremarkable. No acute traumatic abnormality of the cervical spine.        Signed:  Mary Vick MD

## 2021-07-16 NOTE — ASSESSMENT & PLAN NOTE
Continue alprazolam as prescribed. Will monitor and adjust treatment as necessary. Continue Wellbutrin and Cymbalta as well as Geodon.

## 2021-07-16 NOTE — PROGRESS NOTES
Admission assessment complete. Alert, oriented x 4. Respirations even and unlabored, no distress noted. Oxygen in place at 3 LPM via NC. Abdomen soft, bowel sounds active in all 4 quadrants. Purewick in place. BLE red, inflamed, 2+ edema noted. Patient oriented to room and surroundings. All questions answered. No needs voiced at this time. Bed alarm on. Instructed to call should needs arise.

## 2021-07-16 NOTE — PROGRESS NOTES
07/16/21 0003   Dual Skin Pressure Injury Assessment   Dual Skin Pressure Injury Assessment X   Second Care Provider (Based on [address] [place] [address]) MATTEO Dumont   Skin Integumentary   Skin Integumentary (WDL) X   Skin Color Appropriate for ethnicity; Red   Skin Condition/Temp Dry;Warm;Inflamed   Skin Integrity Abrasion  (left hip, right hand)   Bruise to right upper back.

## 2021-07-16 NOTE — ED NOTES
TRANSFER - OUT REPORT:    Verbal report given to Joshua Herman on [address]  being transferred to [address] [place] [address] for routine progression of care       Report consisted of patients Situation, Background, Assessment and   Recommendations(SBAR). Information from the following report(s) SBAR, Kardex and MAR was reviewed with the receiving nurse. Lines:   Peripheral IV 07/15/21 Anterior;Left;Proximal Forearm (Active)        Opportunity for questions and clarification was provided.       Patient transported with:   [employer]

## 2021-07-16 NOTE — PROGRESS NOTES
TRANSFER - IN REPORT:    Verbal report received from [facility]) on [address]  being received from ED(unit) for routine progression of care      Report consisted of patients Situation, Background, Assessment and   Recommendations(SBAR). Information from the following report(s) SBAR, Kardex, ED Summary, MAR, Accordion and Recent Results was reviewed with the receiving nurse. Opportunity for questions and clarification was provided. Assessment completed upon patients arrival to unit and care assumed.

## 2021-07-16 NOTE — ASSESSMENT & PLAN NOTE
Patient does not wear her CPAP. She says she does not tolerate it. She wears 0.5 L of oxygen via nasal cannula at night normally. On more oxygen than that due to hypoxia from right lower lobe pneumonia. Will monitor and adjust treatment as necessary. We will wean back towards baseline.

## 2021-07-16 NOTE — PROGRESS NOTES
CM spoke with patient at bedside for discharge planning. Demographics verified and updated. Patient lives in her own home with her 30 y/o grandson. Home is one level with ramp to enter. Patient states she is independent with ADLs at baseline. She ambulates with a cane or walker. She reports several falls in the home over the last week. She wears 0.5L of oxygen a bedtime; she is unsure of DME company. Patient is not currently receiving any home health services and states she has no history of HH or rehab. She currently plans to return home at discharge and [facility]. Orders placed for PT/OT evals. DME: cane, walker, BSC, oxygen concentrator and portable tanks    PCP: Dr. Ishaan Wright; last seen in June 2021    Discharge plan: Undetermined at this time. PT/OT pending. CM will follow to assist with discharge planning. Care Management Interventions  PCP Verified by CM:  Yes (Dr. Ishaan Wright)  Mode of Transport at Discharge: Self  Discharge Durable Medical Equipment: No  Physical Therapy Consult: No  Occupational Therapy Consult: No  Current Support Network: Own Home (with grandson)  Confirm Follow Up Transport: Family  Discharge Location  Discharge Placement: Home

## 2021-07-16 NOTE — ASSESSMENT & PLAN NOTE
Likely secondary to right lower lobe pneumonia. Will monitor closely and adjust treatment as necessary. Continue oxygen support as needed and wean as able.

## 2021-07-17 ENCOUNTER — APPOINTMENT (OUTPATIENT)
Dept: CT IMAGING | Age: 66
DRG: 193 | End: 2021-07-17
Attending: INTERNAL MEDICINE
Payer: MEDICARE

## 2021-07-17 VITALS
BODY MASS INDEX: 40.97 KG/M2 | DIASTOLIC BLOOD PRESSURE: 67 MMHG | TEMPERATURE: 97.8 F | RESPIRATION RATE: 18 BRPM | HEIGHT: 64 IN | WEIGHT: 240 LBS | OXYGEN SATURATION: 91 % | SYSTOLIC BLOOD PRESSURE: 134 MMHG | HEART RATE: 84 BPM

## 2021-07-17 PROBLEM — R09.02 HYPOXIA: Status: RESOLVED | Noted: 2021-07-15 | Resolved: 2021-07-17

## 2021-07-17 LAB
GLUCOSE BLD STRIP.AUTO-MCNC: 201 MG/DL (ref 65–100)
GLUCOSE BLD STRIP.AUTO-MCNC: 80 MG/DL (ref 65–100)
SERVICE CMNT-IMP: ABNORMAL
SERVICE CMNT-IMP: NORMAL

## 2021-07-17 PROCEDURE — 74011250637 HC RX REV CODE- 250/637: Performed by: EMERGENCY MEDICINE

## 2021-07-17 PROCEDURE — 94760 N-INVAS EAR/PLS OXIMETRY 1: CPT

## 2021-07-17 PROCEDURE — 74011636637 HC RX REV CODE- 636/637: Performed by: INTERNAL MEDICINE

## 2021-07-17 PROCEDURE — 70450 CT HEAD/BRAIN W/O DYE: CPT

## 2021-07-17 PROCEDURE — 74011250636 HC RX REV CODE- 250/636: Performed by: EMERGENCY MEDICINE

## 2021-07-17 PROCEDURE — 74011636637 HC RX REV CODE- 636/637: Performed by: EMERGENCY MEDICINE

## 2021-07-17 PROCEDURE — 77010033678 HC OXYGEN DAILY

## 2021-07-17 PROCEDURE — 74011000258 HC RX REV CODE- 258: Performed by: EMERGENCY MEDICINE

## 2021-07-17 PROCEDURE — 82962 GLUCOSE BLOOD TEST: CPT

## 2021-07-17 PROCEDURE — 2709999900 HC NON-CHARGEABLE SUPPLY

## 2021-07-17 PROCEDURE — 94640 AIRWAY INHALATION TREATMENT: CPT

## 2021-07-17 RX ORDER — DOXYCYCLINE 100 MG/1
100 CAPSULE ORAL 2 TIMES DAILY
Qty: 6 CAPSULE | Refills: 0 | Status: SHIPPED | OUTPATIENT
Start: 2021-07-17 | End: 2021-07-17 | Stop reason: SDUPTHER

## 2021-07-17 RX ORDER — PREDNISONE 20 MG/1
40 TABLET ORAL DAILY
Qty: 6 TABLET | Refills: 0 | Status: SHIPPED | OUTPATIENT
Start: 2021-07-18 | End: 2021-07-21

## 2021-07-17 RX ORDER — CEFPODOXIME PROXETIL 100 MG/1
100 TABLET, FILM COATED ORAL 2 TIMES DAILY
Qty: 6 TABLET | Refills: 0 | Status: SHIPPED | OUTPATIENT
Start: 2021-07-17 | End: 2021-07-20

## 2021-07-17 RX ORDER — CEFPODOXIME PROXETIL 100 MG/1
100 TABLET, FILM COATED ORAL 2 TIMES DAILY
Qty: 6 TABLET | Refills: 0 | Status: SHIPPED | OUTPATIENT
Start: 2021-07-17 | End: 2021-07-17 | Stop reason: SDUPTHER

## 2021-07-17 RX ORDER — DOXYCYCLINE 100 MG/1
100 CAPSULE ORAL 2 TIMES DAILY
Qty: 6 CAPSULE | Refills: 0 | Status: SHIPPED | OUTPATIENT
Start: 2021-07-17 | End: 2021-07-20

## 2021-07-17 RX ADMIN — Medication 1 TABLET: at 09:29

## 2021-07-17 RX ADMIN — BUPROPION HYDROCHLORIDE 150 MG: 150 TABLET, EXTENDED RELEASE ORAL at 09:28

## 2021-07-17 RX ADMIN — INSULIN LISPRO 2 UNITS: 100 INJECTION, SOLUTION INTRAVENOUS; SUBCUTANEOUS at 09:29

## 2021-07-17 RX ADMIN — PANTOPRAZOLE SODIUM 40 MG: 40 TABLET, DELAYED RELEASE ORAL at 06:26

## 2021-07-17 RX ADMIN — CEFTRIAXONE 1 G: 1 INJECTION, POWDER, FOR SOLUTION INTRAMUSCULAR; INTRAVENOUS at 05:29

## 2021-07-17 RX ADMIN — Medication 10 ML: at 05:30

## 2021-07-17 RX ADMIN — PREDNISONE 40 MG: 20 TABLET ORAL at 09:26

## 2021-07-17 RX ADMIN — HYDROCHLOROTHIAZIDE: 12.5 CAPSULE ORAL at 09:27

## 2021-07-17 RX ADMIN — POTASSIUM CHLORIDE 10 MEQ: 10 TABLET, EXTENDED RELEASE ORAL at 09:26

## 2021-07-17 RX ADMIN — Medication 1 TABLET: at 09:26

## 2021-07-17 RX ADMIN — DULOXETINE HYDROCHLORIDE 60 MG: 60 CAPSULE, DELAYED RELEASE ORAL at 09:26

## 2021-07-17 RX ADMIN — ENOXAPARIN SODIUM 40 MG: 40 INJECTION SUBCUTANEOUS at 09:29

## 2021-07-17 RX ADMIN — TIOTROPIUM BROMIDE AND OLODATEROL 2 PUFF: 3.124; 2.736 SPRAY, METERED RESPIRATORY (INHALATION) at 07:55

## 2021-07-17 RX ADMIN — Medication 81 MG: at 09:27

## 2021-07-17 RX ADMIN — HYDROCODONE BITARTRATE AND ACETAMINOPHEN 2 TABLET: 10; 325 TABLET ORAL at 09:30

## 2021-07-17 RX ADMIN — MAGNESIUM GLUCONATE 500 MG ORAL TABLET 400 MG: 500 TABLET ORAL at 09:26

## 2021-07-17 NOTE — DISCHARGE SUMMARY
Hospitalist Discharge Summary     Admit Date:  7/15/2021  5:26 PM   DC note date: 2021  Name:  Mahendra Rinaldi   Age:  77 y.o.  :  1955   MRN:  [id number]   PCP:  Artur Bellamy MD    Presenting Complaint: Fatigue and Fall    Initial Admission Diagnosis: CAP (community acquired pneumonia) [J18.9]  Hypoxia [R09.02]     Problem List for this Hospitalization:  Hospital Problems as of 2021 Date Reviewed: 7/15/2021        Codes Class Noted - Resolved POA    * (Principal) Community acquired pneumonia of right lower lobe of lung ICD-10-CM: J18.9  ICD-9-CM: 486  7/15/2021 - Present Yes        Non-traumatic rhabdomyolysis ICD-10-CM: M62.82  ICD-9-CM: 728.88  7/15/2021 - Present Yes        Hypokalemia ICD-10-CM: E87.6  ICD-9-CM: 276.8  7/15/2021 - Present Yes        CAP (community acquired pneumonia) ICD-10-CM: J18.9  ICD-9-CM: 150  7/15/2021 - Present Yes        Controlled type 2 diabetes mellitus without complication, without long-term current use of insulin ([facility] ICD-10-CM: E11.9  ICD-9-CM: 250.00  2017 - Present Yes        Obstructive sleep apnea ICD-10-CM: G47.33  ICD-9-CM: 327.23  2016 - Present Yes        GERD (gastroesophageal reflux disease) ICD-10-CM: K21.9  ICD-9-CM: 530.81  Unknown - Present Yes    Overview Signed 2017  8:56 AM by Caio Paniauga     EGD 2016 showed an irregular Z line, and mild diffuse gastritis and there are biopsy reports under pathology--Dr. Amy Palomares             Hyperlipidemia ICD-10-CM: E78.5  ICD-9-CM: 272.4  Unknown - Present         Depression ICD-10-CM: F32.9  ICD-9-CM: 311  Unknown - Present Yes        Anxiety ICD-10-CM: F41.9  ICD-9-CM: 300.00  Unknown - Present Yes        Essential hypertension, benign ICD-10-CM: I10  ICD-9-CM: 401.1  Unknown - Present Yes        COPD (chronic obstructive pulmonary disease) ([facility] ICD-10-CM: J44.9  ICD-9-CM: 546  Unknown - Present Yes        Elevated IOP ICD-10-CM: H40.059  ICD-9-CM: 365.00  Unknown - Present         RESOLVED: Hypoxia ICD-10-CM: R09.02  ICD-9-CM: 799.02  7/15/2021 - 7/17/2021 Yes            Hospital Course:  Mrs. Lynder Homans is a nice 76 y/o WF with a h/o COPD (wears \"1.7L\" at night) who was admitted to our service on 7/15 with acute hypoxemic respiratory failure 2/2 CAP. She was started on supplemental oxygen and antibiotics. Her daytime O2 needs were weaned to RA. Her wheezing has resolved. She was concerned with bilateral leg edema off and on since February and has been on antibiotics for it previously. US done and no evidence of DVT in either leg; appears more consistent with early stasis dermatitis. She fell this morning but did not lose consciousness. Head CT was negative. I had a long discussion with patient and her daughter. Family was concerned with patient's falls and the condition of her home. Seen by PT who recommended [facility] therapy. Patient has ultimately opted to discharge to her son's house with home health. CM has assisted. Patient is medically stable for discharge to her son's house to complete antibiotics and prednisone. Disposition: Home Health Care Svc  Activity: Activity as tolerated  Diet: ADULT DIET Regular; Low Fat/Low Chol/High Fiber/NGA; GI [place] (GERD/Peptic Ulcer)  Code Status: Full Code    Follow Up Orders:  No orders of the defined types were placed in this encounter. Follow-up Information     Follow up With Specialties Details Why Contact Info    Shahrzad Lockwood MD Family Medicine In 1 week Hospital f/u. Pneumonia. [facility]  [phone]            Time spent in patient discharge and coordination 35 minutes. Plan was discussed with patient and daughter, RN, CARLITO. All questions answered. Patient was stable at time of discharge. Given instructions to call a physician or return if any concerns. Discharge summary and encounter summary was sent to PCP electronically via \"Comm Mgt\" link in [employer], if possible.     Discharge Info:   Current Discharge Medication List      START taking these medications    Details   predniSONE (DELTASONE) 20 mg tablet Take 40 mg by mouth daily for 3 days. Qty: 6 Tablet, Refills: 0  Start date: 7/18/2021, End date: 7/21/2021      cefpodoxime (VANTIN) 100 mg tablet Take 1 Tablet by mouth two (2) times a day for 3 days. Qty: 6 Tablet, Refills: 0  Start date: 7/17/2021, End date: 7/20/2021      doxycycline (VIBRAMYCIN) 100 mg capsule Take 1 Capsule by mouth two (2) times a day for 3 days. Qty: 6 Capsule, Refills: 0  Start date: 7/17/2021, End date: 7/20/2021         CONTINUE these medications which have NOT CHANGED    Details   ALPRAZolam (XANAX) 2 mg tablet Take 1 Tab by mouth two (2) times daily as needed for Anxiety. Max Daily Amount: 4 mg. Qty: 60 Tab, Refills: 0    Associated Diagnoses: Anxiety      HYDROcodone-acetaminophen (NORCO)  mg tablet Take 2 Tabs by mouth three (3) times daily. Max Daily Amount: 6 Tabs. Qty: 180 Tab, Refills: 0    Associated Diagnoses: Chronic pain syndrome      naloxone (NARCAN) 4 mg/actuation nasal spray 1 Nebo by IntraNASal route once as needed for up to 2 doses. As directed; may repeat every 2-3 mins until ems arrives or patient responds  Indications: OPIATE-INDUCED RESPIRATORY DEPRESSION, If stops breathing from too much pain meds  Qty: 2 Each, Refills: 1    Associated Diagnoses: Chronic pain syndrome      albuterol (VENTOLIN HFA) 90 mcg/actuation inhaler Take 2 Puffs by inhalation every four (4) hours as needed for Wheezing. Qty: 1 Inhaler, Refills: 0      umeclidinium-vilanterol (ANORO ELLIPTA) 62.5-25 mcg/actuation inhaler Take 1 Puff by inhalation daily. Qty: 1 Inhaler, Refills: 0      miSOPROStol (CYTOTEC) 200 mcg tablet Take 1 Tab by mouth two (2) times a day. To coat stomach. Qty: 180 Tab, Refills: 3      ziprasidone (GEODON) 20 mg capsule Take 2 Caps by mouth nightly.  Indications: Depression  Qty: 180 Cap, Refills: 3      promethazine (PHENERGAN) 25 mg tablet Take 1 Tab by mouth every six (6) hours as needed for Nausea. Qty: 30 Tab, Refills: 2      Lancets (ACCU-CHEK SOFTCLIX LANCETS) misc 1 lancet two times daily. DX: E11.9 Diabetes  Qty: 200 Each, Refills: 3      glucose blood VI test strips (ACCU-CHEK LASHELL PLUS TEST STRP) strip 1 strip two times daily. DX E11.9 Diabetes  Qty: 200 Strip, Refills: 3      Blood-Glucose Meter (ACCU-CHEK LASHELL PLUS METER) misc Use meter two times daily to check blood sugars. DX: E11.9 Diabetes  Qty: 1 Each, Refills: 0      buPROPion SR (WELLBUTRIN SR) 150 mg SR tablet Take 1 Tab by mouth two (2) times a day. Qty: 180 Tab, Refills: 0      DULoxetine (CYMBALTA) 60 mg capsule Take 1 Cap by mouth daily. Take 1 cap PO at bedtime    Indications: FIBROMYALGIA  Qty: 90 Cap, Refills: 3      omeprazole (PRILOSEC) 40 mg capsule Take 1 Cap by mouth daily. 30 min before breakfast  Indications: HEARTBURN, To lower stomach acid  Qty: 90 Cap, Refills: 3      losartan-hydroCHLOROthiazide (HYZAAR) 50-12.5 mg per tablet Take 1 Tab by mouth daily. Indications: hypertension, To lower blood pressure / Fluid pill  Qty: 90 Tab, Refills: 3      gabapentin (NEURONTIN) 600 mg tablet Take 1 Tab by mouth nightly. Indications: NEUROPATHIC PAIN  Qty: 90 Tab, Refills: 3      ezetimibe-simvastatin (VYTORIN 10/20) 10-20 mg per tablet Take 1 Tab by mouth nightly. Indications: to lower cholesterol  Qty: 90 Tab, Refills: 3      potassium chloride (KLOR-CON M10) 10 mEq tablet Take 1 Tab by mouth daily. Qty: 90 Tab, Refills: 3      magnesium oxide 500 mg tab Take  by mouth.      mv,Ca,min-folic acid-vit K1 (ONE-A-DAY WOMEN'S 50 PLUS) 400-20 mcg tab Take 1 Tab by mouth daily. Qty: 90 Tab, Refills: 3      aspirin delayed-release 81 mg tablet Take 81 mg by mouth daily. calcium citrate-vitamin D3 (CITRACAL + D) tablet Take 2 Tabs by mouth nightly. travoprost (TRAVATAN Z) 0.004 % ophthalmic solution Administer 1 Drop to both eyes every evening.          STOP taking these medications       amoxicillin-clavulanate (AUGMENTIN) 875-125 mg per tablet Comments:   Reason for Stopping:               Procedures done this admission:  * No surgery found *    Consults this admission:  None    Echocardiogram/EKG results:  No results found for this or any previous visit. Diagnostic Imaging/Tests:   XR CHEST PA LAT    Result Date: 7/15/2021  EXAM: CHEST X-RAY, 2 VIEWS INDICATION: hypoxia COMPARISON: Chest CT 4/12/2016 TECHNIQUE: Frontal and lateral views of the chest were obtained. FINDINGS: There is a vague opacity at the right cardiophrenic angle. No pneumothorax or pleural effusion. The cardiomediastinal silhouette is within normal limits. Multilevel degenerative changes of the spine. A vague opacity at the right cardiophrenic angle, which which could reflect airspace disease such as atelectasis/infiltrate or proliferation of cardiophrenic fat. Consider CT. XR SACRUM AND COCCYX    Result Date: 7/15/2021  Sacrum and coccyx radiographs, 3 views INDICATION: Sacral pain following injury COMPARISON: None. FINDINGS: No acute fracture is clearly discerned. The pelvic ring appears intact, where imaged. The sacroiliac joints are symmetric and demonstrate osteoarthritic changes. Degenerative changes of the lower lumbar spine. Arteriosclerotic calcifications. No acute radiographic abnormality within the limitations imposed by the patient's body habitus and osseous demineralization. CT HEAD WO CONT    Result Date: 7/17/2021  Head CT INDICATION: Fall, frontal head injury Multiple axial images obtained through the brain without intravenous contrast. Radiation dose reduction techniques were used for this study: All CT scans performed at this facility use one or all of the following: Automated exposure control, adjustment of the mA and/or kVp according to patient's size, iterative reconstruction. FINDINGS: No areas of abnormal attenuation are seen in the brain.  There is no CT evidence of acute hemorrhage or infarction. The ventricles are normal in size. There are no extra-axial fluid collections. No masses are seen. The sinuses are clear. There are no bony lesions. No CT evidence of acute intracranial abnormality. CT HEAD WO CONT    Result Date: 7/15/2021  NONCONTRAST HEAD CT CLINICAL HISTORY:  Weakness and somnolence after fall this morning. TECHNIQUE:  Axial images were obtained with spiral technique. Radiation dose reduction was achieved using one or all of the following techniques: automated exposure control, weight-based dosing, iterative reconstruction. COMPARISON:  None. REPORT:   Standard noncontrast head CT demonstrates no definite intracranial mass effect, hemorrhage, or evidence of acute geographic infarction. The ventricles are normal in size and configuration, accounting for the patient's age. Orbits  and paranasal sinuses are clear where imaged. Bone windows demonstrate no definite fracture or destruction. NO ACUTE INTRACRANIAL ABNORMALITY OR CALVARIAL FRACTURE IDENTIFIED AT NONCONTRAST CT.     CT SPINE CERV WO CONT    Result Date: 7/15/2021  CT CERVICAL SPINE INDICATION: Repetitive falls COMPARISON: None. TECHNIQUE: Contiguous axial images from the skull base through the superior mediastinum were obtained with coronal and sagittal reformations. Radiation dose reduction techniques were used for this study. Our CT scanners use one or all of the following: Automated exposure control, adjustment of the mA and/or kV according to patient size, iterative reconstruction. 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 No acute fracture is clearly identified. Cervical spine alignment is normal. The craniocervical junction is intact. The lateral masses are aligned. Vertebral body heights are preserved. Multilevel degenerative changes of the spine. Visualized soft tissues are unremarkable.      No acute traumatic abnormality of the cervical spine.     DUPLEX LOWER EXT VENOUS BILAT    Result Date: 7/16/2021  Bilateral lower extremity duplex venous study, 7/16/2021. CLINICAL HISTORY: Bilateral lower extremity chronic swelling. Technique: Grayscale, and duplex Doppler images of the bilateral lower extremity venous vascular systems were obtained using an 9 MHz transducer. In addition, compression and augmentation were performed at multiple levels. FINDINGS: Venous structures of the calves cannot be visualized due to bandaging. No lower extremity DVT is directly visualized in remaining venous structures of the bilateral lower extremities. Normal augmentation is seen at all evaluated levels without findings to suggest more proximal occlusion. Soft tissue edema seen at multiple levels. No abnormal drainable fluid collection is seen. 1. No evidence for lower extremity DVT in either leg. All Micro Results     Procedure Component Value Units Date/Time    CULTURE, BLOOD [[id number]] Collected: 07/15/21 1810    Order Status: Completed Specimen: Blood Updated: 07/17/21 0815     Special Requests: --        LEFT  Antecubital       Culture result: NO GROWTH 2 DAYS       SARS-COV-2, PCR [[id number]] Collected: 07/15/21 2134    Order Status: Completed Specimen: Nasopharyngeal Updated: 07/16/21 1151     Specimen source Nasopharyngeal        SARS-CoV-2 Not detected        Comment:      The specimen is NEGATIVE for SARS-CoV-2, the novel coronavirus associated with COVID-19. This test has been authorized by the FDA under an Emergency Use Authorization (EUA) for use by authorized laboratories.         Fact sheet for Healthcare Providers: ConventionUpdate.co.nz       Fact sheet for Patients: ConventionUpdate.co.nz       Methodology: RT-PCR         COVID-19 RAPID TEST [[id number]] Collected: 07/15/21 2134    Order Status: Completed Specimen: Nasopharyngeal Updated: 07/15/21 2201     Specimen source Nasopharyngeal COVID-19 rapid test Not detected        Comment:      The specimen is NEGATIVE for SARS-CoV-2, the novel coronavirus associated with COVID-19. A negative result does not rule out COVID-19. This test has been authorized by the FDA under an Emergency Use Authorization (EUA) for use by authorized laboratories.         Fact sheet for Healthcare Providers: ConventionChaseFuturedate.co.nz  Fact sheet for Patients: ConventionChaseFuturedate.co.nz       Methodology: Isothermal Nucleic Acid Amplification         COVID-19 RAPID TEST [[id number]] Collected: 07/15/21 2134    Order Status: Canceled     INFLUENZA A & B AG (RAPID TEST) [[id number]]     Order Status: Canceled Specimen: Nasopharyngeal     CULTURE, BLOOD [[id number]] Collected: 07/15/21 1800    Order Status: Canceled Specimen: Blood           SARS-CoV-2 Lab Results  \"Novel Coronavirus\" Test: No results found for: COV2NT   \"Emergent Disease\" Test: No results found for: EDPR  \"SARS-COV-2\" Test: No results found for: XGCOVT  Rapid Test:   Lab Results   Component Value Date/Time    COVR Not detected 07/15/2021 09:34 PM            Labs: Results:       BMP, Mg, Phos Recent Labs     07/16/21  0611 07/15/21  1810   * 143   K 3.5 2.9*   * 104   CO2 35* 32   AGAP 1* 7   BUN 8 11   CREA 0.44* 0.64   CA 8.5 8.7   GLU 77 112*   MG  --  2.1      CBC Recent Labs     07/16/21  0611 07/15/21  1810   WBC 3.9* 5.3   RBC 3.83* 4.04*   HGB 10.2* 10.9*   HCT 34.7* 36.5    174   GRANS 57 68   LYMPH 27 19   EOS 5 3   MONOS 10 9   BASOS 1 1   IG 0 0   ANEU 2.2 3.6   ABL 1.1 1.0   TAZ 0.2 0.2   ABM 0.4 0.5   ABB 0.0 0.0   AIG 0.0 0.0      LFT Recent Labs     07/15/21  1810   ALT 36      TP 6.5   ALB 2.7*   GLOB 3.8*   AGRAT 0.7*      Cardiac Testing Lab Results   Component Value Date/Time     (H) 07/15/2021 06:10 PM    CK 56 02/17/2009 08:54 AM      Coagulation Tests No results found for: PTP, INR, APTT, INREXT   A1c Lab Results Component Value Date/Time    Hemoglobin A1c 5.0 07/15/2021 06:10 PM    Hemoglobin A1c 7.5 (H) 02/12/2018 10:21 AM    Hemoglobin A1c 5.7 06/10/2009 09:13 AM      Lipid Panel Lab Results   Component Value Date/Time    Cholesterol, total 134 02/12/2018 10:21 AM    HDL Cholesterol 47 02/12/2018 10:21 AM    LDL, calculated 62 02/12/2018 10:21 AM    VLDL, calculated 25 02/12/2018 10:21 AM    Triglyceride 123 02/12/2018 10:21 AM    CHOL/HDL Ratio 4.6 06/10/2009 09:12 AM      Thyroid Panel Lab Results   Component Value Date/Time    TSH 1.990 02/12/2018 10:21 AM    TSH 3.630 08/08/2017 09:38 AM        Most Recent UA Lab Results   Component Value Date/Time    Color KYLE 07/15/2021 07:40 PM    Appearance CLEAR 07/15/2021 07:40 PM    Specific gravity 1.029 (H) 07/15/2021 07:40 PM    pH (UA) 6.0 07/15/2021 07:40 PM    Protein TRACE (A) 07/15/2021 07:40 PM    Glucose Negative 07/15/2021 07:40 PM    Ketone TRACE (A) 07/15/2021 07:40 PM    Bilirubin SMALL (A) 07/15/2021 07:40 PM    Blood Negative 07/15/2021 07:40 PM    Urobilinogen 4.0 (H) 07/15/2021 07:40 PM    Nitrites Negative 07/15/2021 07:40 PM    Leukocyte Esterase Negative 07/15/2021 07:40 PM    WBC 0-3 07/15/2021 07:40 PM    RBC 0-3 07/15/2021 07:40 PM    Epithelial cells 0-3 07/15/2021 07:40 PM    Bacteria 0 07/15/2021 07:40 PM    Casts 5-10 07/15/2021 07:40 PM    Crystals, urine MARKED 07/15/2021 07:40 PM    Other observations RESULTS VERIFIED MANUALLY 07/15/2021 07:40 PM          All Labs from Last 24 Hrs:  Recent Results (from the past 24 hour(s))   GLUCOSE, POC    Collection Time: 07/16/21  5:01 PM   Result Value Ref Range    Glucose (POC) 99 65 - 100 mg/dL    Performed by Yee    GLUCOSE, POC    Collection Time: 07/16/21  9:41 PM   Result Value Ref Range    Glucose (POC) 107 (H) 65 - 100 mg/dL    Performed by Deidre        Discharge Exam:  Patient Vitals for the past 24 hrs:   Temp Pulse Resp BP SpO2   07/17/21 1110 97.8 °F (36.6 °C) 84 18 134/67 91 %   07/17/21 0801     98 %   07/17/21 0725 98.5 °F (36.9 °C) 81 16 (!) 154/97 (!) 89 %   07/17/21 0327 97.9 °F (36.6 °C) 77 16 133/75 95 %   07/16/21 2329 98.2 °F (36.8 °C) 80 16 124/68 97 %   07/16/21 2145     98 %   07/16/21 1952 98.4 °F (36.9 °C) 82 18 (!) 155/85 98 %   07/16/21 1618 97.9 °F (36.6 °C) 77 18 (!) 116/50 91 %   07/16/21 1422     90 %     Oxygen Therapy  O2 Sat (%): 91 % (07/17/21 1110)  Pulse via Oximetry: 76 beats per minute (07/17/21 0801)  O2 Device: Nasal cannula (07/17/21 0801)  Skin Assessment: Clean, dry, & intact (07/16/21 1930)  Skin Protection for O2 Device: No (07/16/21 1930)  O2 Flow Rate (L/min): 2 l/min (07/17/21 0801)    Estimated body mass index is 41.2 kg/m² as calculated from the following:    Height as of this encounter: 5' 4\" (1.626 m). Weight as of this encounter: 108.9 kg (240 lb). No intake or output data in the 24 hours ending 07/17/21 1227    *Note that automatically entered I/Os may not be accurate; dependent on patient compliance with collection and accurate  by assistants. General:    Well nourished. No overt distress. Obese. Eyes:   Normal sclerae. Extraocular movements intact. ENT:  Normocephalic, atraumatic. Moist mucous membranes  CV:   Regular rate and rhythm. No m/r/g. No edema. Lungs:  Decreased but clear. RA O2. Abdomen: Soft, nontender, nondistended. Extremities: Warm and dry. No cyanosis or clubbing. Mild pre-tibial erythema to legs. Neurologic: CN II-XII grossly intact. No gross focal deficits. Alert. Skin:     No rashes. No jaundice. Psych:  Normal mood and affect.     Current Med List in Hospital:   Current Facility-Administered Medications   Medication Dose Route Frequency    albuterol (PROVENTIL VENTOLIN) nebulizer solution 2.5 mg  2.5 mg Nebulization Q6H PRN    predniSONE (DELTASONE) tablet 40 mg  40 mg Oral DAILY    ALPRAZolam (XANAX) tablet 2 mg  2 mg Oral BID PRN    aspirin delayed-release tablet 81 mg  81 mg Oral DAILY    buPROPion SR (WELLBUTRIN SR) tablet 150 mg  150 mg Oral BID    calcium-vitamin D (OS-EDUARDO +D3) 500 mg-200 unit per tablet 1 Tablet  1 Tablet Oral DAILY    DULoxetine (CYMBALTA) capsule 60 mg  60 mg Oral DAILY    ezetimibe/atorvastatin 10/10 mg   Oral QHS    gabapentin (NEURONTIN) capsule 600 mg  600 mg Oral QHS    HYDROcodone-acetaminophen (NORCO)  mg tablet 2 Tablet  2 Tablet Oral TID    magnesium oxide (MAG-OX) tablet 400 mg  400 mg Oral DAILY    multivitamin, tx-iron-ca-min (THERA-M w/ IRON) tablet 1 Tablet  1 Tablet Oral DAILY    pantoprazole (PROTONIX) tablet 40 mg  40 mg Oral ACB    potassium chloride (KLOR-CON) tablet 10 mEq  10 mEq Oral DAILY    latanoprost (XALATAN) 0.005 % ophthalmic solution 1 Drop  1 Drop Both Eyes QPM    tiotropium-olodateroL (STIOLTO RESPIMAT) 2.5-2.5 mcg/actuation inhaler 2 Puff  2 Puff Inhalation BID RT    ziprasidone (GEODON) capsule 40 mg  40 mg Oral QHS    sodium chloride (NS) flush 5-40 mL  5-40 mL IntraVENous Q8H    sodium chloride (NS) flush 5-40 mL  5-40 mL IntraVENous PRN    insulin lispro (HUMALOG) injection   SubCUTAneous AC&HS    azithromycin (ZITHROMAX) 500 mg in 0.9% sodium chloride 250 mL (VIAL-MATE)  500 mg IntraVENous Q24H    cefTRIAXone (ROCEPHIN) 1 g in 0.9% sodium chloride (MBP/ADV) 50 mL MBP  1 g IntraVENous Q24H    albuterol (PROVENTIL VENTOLIN) nebulizer solution 2.5 mg  2.5 mg Nebulization Q6H PRN    enoxaparin (LOVENOX) injection 40 mg  40 mg SubCUTAneous Q12H    losartan/hydroCHLOROthiazide (HYZAAR) 50/12.5 mg   Oral DAILY       No Known Allergies  Immunization History   Administered Date(s) Administered    Influenza High Dose Vaccine PF 10/30/2015    Influenza Vaccine 11/03/2016    Influenza Vaccine (Quad) PF (>6 Mo Flulaval, Fluarix, and >3 Yrs Afluria, Fluzone 58051) 11/03/2016    Pneumococcal Polysaccharide (PPSV-23) 09/07/2011    Tdap 05/03/2016    Zoster Vaccine, Live 04/30/2016       Signed:  Raisa Sawyer MD

## 2021-07-17 NOTE — PROGRESS NOTES
Discharge instructions provided to patient and her daughter. Prescriptions provided to patient. Opportunity provided to patient to ask questions and answered. Discharged home with daughter and to patient's son's home.

## 2021-07-17 NOTE — PROGRESS NOTES
Chart reviewed. PT/OT recommending [facility] services. SW met with the patient who was receptive, states that she will be discharging to her son Ike's home in [place] at [address] [place] [address] and his number is [phone]. Choice list provided, preference is for [facility]. Referral faxed for PT/OT and RN for wound care.      Sravan Smith LMSW    [facility]    * [email or web address]

## 2021-07-17 NOTE — PROGRESS NOTES
Patient left sitting up on bedside with skid free socks and walker at hand. Patient's daughter in front of her. Patient decided she needed to go to the restroom. She lost her balance and fell against the wall sustaining a bloody nose. Dr. Dinah Mccabe made aware.  Received order CT of head W/O contrast.

## 2021-07-17 NOTE — PROGRESS NOTES
Patient is anticipated to discharge today. Plan for discharge is as follows:    Care Management Interventions  PCP Verified by CM: Yes  Mode of Transport at Discharge: Self  Transition of Care Consult (CM Consult): [address]: No  Reason Outside [place]: Out of service area  Discharge Durable Medical Equipment: No  Physical Therapy Consult: Yes  Occupational Therapy Consult: Yes  Current Support Network: Own Home  Confirm Follow Up Transport: Family  The Plan for Transition of Care is Related to the Following Treatment Goals : PT/OT/RN  The Patient and/or Patient Representative was Provided with a Choice of Provider and Agrees with the Discharge Plan?: Yes  Name of the Patient Representative Who was Provided with a Choice of Provider and Agrees with the Discharge Plan: [address] of Choice List was Provided with Basic Dialogue that Supports the Patient's Individualized Plan of Care/Goals, Treatment Preferences and Shares the Quality Data Associated with the Providers?: Yes  Discharge Location  Discharge Placement: Home with home health ([facility])    Milestones and interventions have been entered.      Kelsey Luke LMSW    [facility]    * [email or web address]    ( [phone]

## 2021-07-17 NOTE — PROGRESS NOTES
Problem: Falls - Risk of  Goal: *Absence of Falls  Description: Document [place] Fall Risk and appropriate interventions in the flowsheet. Outcome: Progressing Towards Goal  Note: Fall Risk Interventions:  Mobility Interventions: Patient to call before getting OOB         Medication Interventions: Patient to call before getting OOB    Elimination Interventions: Patient to call for help with toileting needs    History of Falls Interventions: Door open when patient unattended         Problem: Patient Education: Go to Patient Education Activity  Goal: Patient/Family Education  Outcome: Progressing Towards Goal     Problem: Pressure Injury - Risk of  Goal: *Prevention of pressure injury  Description: Document Duong Scale and appropriate interventions in the flowsheet.   Outcome: Progressing Towards Goal  Note: Pressure Injury Interventions:  Sensory Interventions: Assess changes in LOC    Moisture Interventions: Absorbent underpads    Activity Interventions: Increase time out of bed    Mobility Interventions: PT/OT evaluation    Nutrition Interventions: Document food/fluid/supplement intake    Friction and Shear Interventions: Apply protective barrier, creams and emollients                Problem: Patient Education: Go to Patient Education Activity  Goal: Patient/Family Education  Outcome: Progressing Towards Goal

## 2021-07-17 NOTE — PROGRESS NOTES
07/17/21 0725   Pulmonary Toilet   Pulmonary Toilet Incentive Spirometry   Incentive Spirometry Treatment   Level of Service Initial   Predicted Volume (ml) 1500 ml   Actual Volume (ml) 1500 ml   % Predicted Volume (ml) 1   Treatment Tolerance Well

## 2021-07-17 NOTE — DISCHARGE INSTRUCTIONS
Pneumonia: Care Instructions  Overview     Pneumonia is an infection of the lungs. Most cases are caused by infections from bacteria or viruses. Pneumonia may be mild or very severe. If it is caused by bacteria, you will be treated with antibiotics. It may take a few weeks to a few months to recover fully from pneumonia, depending on how sick you were and whether your overall health is good. Follow-up care is a key part of your treatment and safety. Be sure to make and go to all appointments, and call your doctor if you are having problems. It's also a good idea to know your test results and keep a list of the medicines you take. How can you care for yourself at home? · Take your antibiotics exactly as directed. Do not stop taking the medicine just because you are feeling better. You need to take the full course of antibiotics. · Take your medicines exactly as prescribed. Call your doctor if you think you are having a problem with your medicine. · Get plenty of rest and sleep. You may feel weak and tired for a while, but your energy level will improve with time. · To prevent dehydration, drink plenty of fluids, enough so that your urine is light yellow or clear like water. Choose water and other caffeine-free clear liquids until you feel better. If you have kidney, heart, or liver disease and have to limit fluids, talk with your doctor before you increase the amount of fluids you drink. · Take care of your cough so you can rest. A cough that brings up mucus from your lungs is common with pneumonia. It is one way your body gets rid of the infection. But if coughing keeps you from resting or causes severe fatigue and chest-wall pain, talk to your doctor. Your doctor may suggest that you take a medicine to reduce the cough. · Use a vaporizer or humidifier to add moisture to your bedroom. Follow the directions for cleaning the machine. · Do not smoke or allow others to smoke around you.  Smoke will make your cough last longer. If you need help quitting, talk to your doctor about stop-smoking programs and medicines. These can increase your chances of quitting for good. · Take an over-the-counter pain medicine, such as acetaminophen (Tylenol), ibuprofen (Advil, Motrin), or naproxen (Aleve). Read and follow all instructions on the label. · Do not take two or more pain medicines at the same time unless the doctor told you to. Many pain medicines have acetaminophen, which is Tylenol. Too much acetaminophen (Tylenol) can be harmful. · If you were given a spirometer to measure how well your lungs are working, use it as instructed. This can help your doctor tell how your recovery is going. · To prevent pneumonia in the future, talk to your doctor about getting a flu vaccine (once a year) and a pneumococcal vaccine (one time only for most people). When should you call for help? Call 911 anytime you think you may need emergency care. For example, call if:    · You have severe trouble breathing. Call your doctor now or seek immediate medical care if:    · You cough up dark brown or bloody mucus (sputum).     · You have new or worse trouble breathing.     · You are dizzy or lightheaded, or you feel like you may faint. Watch closely for changes in your health, and be sure to contact your doctor if:    · You have a new or higher fever.     · You are coughing more deeply or more often.     · You are not getting better after 2 days (48 hours).     · You do not get better as expected. Where can you learn more? Go to http://chucky-vonda.info/  Enter D336 in the search box to learn more about \"Pneumonia: Care Instructions. \"  Current as of: October 26, 2020               Content Version: 12.8  © 8120-1677 [employer]. Care instructions adapted under license by [employer] (which disclaims liability or warranty for this information).  If you have questions about a medical condition or this instruction, always ask your healthcare professional. [address] any warranty or liability for your use of this information. Patient Education        Chronic Obstructive Pulmonary Disease (COPD) Flare-Ups: Care Instructions  Your Care Instructions     Chronic obstructive pulmonary disease (COPD) is a lung disease that makes it hard to breathe. It is caused by damage to the lungs over many years, usually from smoking. COPD is often a mix of two diseases:  · Chronic bronchitis: The airways that carry air to the lungs (bronchial tubes) get inflamed and make a lot of mucus. This can narrow or block the airways. · Emphysema: In a healthy person, the tiny air sacs in the lungs are like balloons. As you breathe in and out, they get bigger and smaller to move air through your lungs. But with emphysema, these air sacs are damaged and lose their stretch. Less air gets in and out of the lungs. Many people with COPD have attacks called flare-ups or exacerbations. This is when your usual symptoms quickly get worse and stay worse. The doctor has checked you carefully. But problems can develop later. If you notice any problems or new symptoms, get medical treatment right away. Follow-up care is a key part of your treatment and safety. Be sure to make and go to all appointments, and call your doctor if you are having problems. It's also a good idea to know your test results and keep a list of the medicines you take. How can you care for yourself at home? · Be safe with medicines. Take your medicines exactly as prescribed. Call your doctor if you think you are having a problem with your medicine. You may be taking medicines such as:  ? Bronchodilators. These help open your airways and make breathing easier. ? Corticosteroids. These reduce airway inflammation. They may be given as pills, in a vein, or in an inhaled form.  You may go home with pills in addition to an inhaler that you already use. · A spacer may help you get more inhaled medicine to your lungs. Ask your doctor or pharmacist if a spacer is right for you. If it is, ask how to use it properly. · If your doctor prescribed antibiotics, take them as directed. Do not stop taking them just because you feel better. You need to take the full course of antibiotics. · If your doctor prescribed oxygen, use the flow rate your doctor has recommended. Do not change it without talking to your doctor first.  · Do not smoke. Smoking makes COPD worse. If you need help quitting, talk to your doctor about stop-smoking programs and medicines. These can increase your chances of quitting for good. When should you call for help? Call 911 anytime you think you may need emergency care. For example, call if:    · You have severe trouble breathing. Call your doctor now or seek immediate medical care if:    · You have new or worse trouble breathing.     · Your coughing or wheezing gets worse.     · You cough up dark brown or bloody mucus (sputum).     · You have a new or higher fever. Watch closely for changes in your health, and be sure to contact your doctor if:    · You notice more mucus or a change in the color of your mucus.     · You need to use your antibiotic or steroid pills.     · You do not get better as expected. Where can you learn more? Go to http://chucky-vonda.info/  Enter D989 in the search box to learn more about \"Chronic Obstructive Pulmonary Disease (COPD) Flare-Ups: Care Instructions. \"  Current as of: October 26, 2020               Content Version: 12.8  © 2006-2021 [employer]. Care instructions adapted under license by [employer] (which disclaims liability or warranty for this information).  If you have questions about a medical condition or this instruction, always ask your healthcare professional. [place] [address] any warranty or liability for your use of this information.

## 2021-07-20 LAB
BACTERIA SPEC CULT: NORMAL
SERVICE CMNT-IMP: NORMAL

## 2021-07-23 ENCOUNTER — HOSPITAL ENCOUNTER (EMERGENCY)
Age: 66
Discharge: HOME | End: 2021-07-23
Attending: EMERGENCY MEDICINE
Payer: MEDICARE

## 2021-07-23 ENCOUNTER — APPOINTMENT (OUTPATIENT)
Dept: GENERAL RADIOLOGY | Age: 66
End: 2021-07-23
Attending: EMERGENCY MEDICINE
Payer: MEDICARE

## 2021-07-23 ENCOUNTER — APPOINTMENT (OUTPATIENT)
Dept: CT IMAGING | Age: 66
End: 2021-07-23
Attending: EMERGENCY MEDICINE
Payer: MEDICARE

## 2021-07-23 VITALS
TEMPERATURE: 98.2 F | DIASTOLIC BLOOD PRESSURE: 77 MMHG | HEART RATE: 90 BPM | HEIGHT: 63 IN | RESPIRATION RATE: 30 BRPM | OXYGEN SATURATION: 96 % | SYSTOLIC BLOOD PRESSURE: 164 MMHG | WEIGHT: 240 LBS | BODY MASS INDEX: 42.52 KG/M2

## 2021-07-23 DIAGNOSIS — J44.1 ACUTE EXACERBATION OF CHRONIC OBSTRUCTIVE PULMONARY DISEASE (COPD) (HCC): Primary | ICD-10-CM

## 2021-07-23 LAB
ALBUMIN SERPL-MCNC: 2.8 G/DL (ref 3.2–4.6)
ALBUMIN/GLOB SERPL: 0.8 {RATIO} (ref 1.2–3.5)
ALP SERPL-CCNC: 123 U/L (ref 50–130)
ALT SERPL-CCNC: 41 U/L (ref 12–65)
ANION GAP SERPL CALC-SCNC: 6 MMOL/L (ref 7–16)
APPEARANCE UR: CLEAR
ARTERIAL PATENCY WRIST A: ABNORMAL
AST SERPL-CCNC: 55 U/L (ref 15–37)
BACTERIA URNS QL MICRO: 0 /HPF
BASE EXCESS BLD CALC-SCNC: 7.4 MMOL/L
BASOPHILS # BLD: 0 K/UL (ref 0–0.2)
BASOPHILS NFR BLD: 1 % (ref 0–2)
BDY SITE: ABNORMAL
BILIRUB SERPL-MCNC: 0.8 MG/DL (ref 0.2–1.1)
BILIRUB UR QL: NEGATIVE
BNP SERPL-MCNC: 121 PG/ML (ref 5–125)
BUN SERPL-MCNC: 17 MG/DL (ref 8–23)
CALCIUM SERPL-MCNC: 8.8 MG/DL (ref 8.3–10.4)
CASTS URNS QL MICRO: 0 /LPF
CHLORIDE SERPL-SCNC: 103 MMOL/L (ref 98–107)
CO2 SERPL-SCNC: 34 MMOL/L (ref 21–32)
COLOR UR: YELLOW
CREAT SERPL-MCNC: 0.54 MG/DL (ref 0.6–1)
D DIMER PPP FEU-MCNC: 1.57 UG/ML(FEU)
DIFFERENTIAL METHOD BLD: ABNORMAL
EOSINOPHIL # BLD: 0.2 K/UL (ref 0–0.8)
EOSINOPHIL NFR BLD: 4 % (ref 0.5–7.8)
EPI CELLS #/AREA URNS HPF: ABNORMAL /HPF
ERYTHROCYTE [DISTWIDTH] IN BLOOD BY AUTOMATED COUNT: 19.6 % (ref 11.9–14.6)
GAS FLOW.O2 O2 DELIVERY SYS: ABNORMAL L/MIN
GLOBULIN SER CALC-MCNC: 3.6 G/DL (ref 2.3–3.5)
GLUCOSE SERPL-MCNC: 77 MG/DL (ref 65–100)
GLUCOSE UR STRIP.AUTO-MCNC: NEGATIVE MG/DL
HCO3 BLD-SCNC: 33.3 MMOL/L (ref 22–26)
HCT VFR BLD AUTO: 38.8 % (ref 35.8–46.3)
HGB BLD-MCNC: 12 G/DL (ref 11.7–15.4)
HGB UR QL STRIP: NEGATIVE
IMM GRANULOCYTES # BLD AUTO: 0 K/UL (ref 0–0.5)
IMM GRANULOCYTES NFR BLD AUTO: 0 % (ref 0–5)
KETONES UR QL STRIP.AUTO: NEGATIVE MG/DL
LACTATE SERPL-SCNC: 1.8 MMOL/L (ref 0.4–2)
LEUKOCYTE ESTERASE UR QL STRIP.AUTO: ABNORMAL
LYMPHOCYTES # BLD: 1 K/UL (ref 0.5–4.6)
LYMPHOCYTES NFR BLD: 22 % (ref 13–44)
MAGNESIUM SERPL-MCNC: 2 MG/DL (ref 1.8–2.4)
MCH RBC QN AUTO: 27.8 PG (ref 26.1–32.9)
MCHC RBC AUTO-ENTMCNC: 30.9 G/DL (ref 31.4–35)
MCV RBC AUTO: 90 FL (ref 79.6–97.8)
MONOCYTES # BLD: 0.4 K/UL (ref 0.1–1.3)
MONOCYTES NFR BLD: 8 % (ref 4–12)
NEUTS SEG # BLD: 3.1 K/UL (ref 1.7–8.2)
NEUTS SEG NFR BLD: 65 % (ref 43–78)
NITRITE UR QL STRIP.AUTO: NEGATIVE
NRBC # BLD: 0 K/UL (ref 0–0.2)
O2/TOTAL GAS SETTING VFR VENT: 28 %
PCO2 BLD: 51.9 MMHG (ref 35–45)
PH BLD: 7.42 [PH] (ref 7.35–7.45)
PH UR STRIP: 6 [PH] (ref 5–9)
PLATELET # BLD AUTO: 124 K/UL (ref 150–450)
PMV BLD AUTO: 9.9 FL (ref 9.4–12.3)
PO2 BLD: 49 MMHG (ref 75–100)
POTASSIUM SERPL-SCNC: 3.8 MMOL/L (ref 3.5–5.1)
PROT SERPL-MCNC: 6.4 G/DL (ref 6.3–8.2)
PROT UR STRIP-MCNC: NEGATIVE MG/DL
RBC # BLD AUTO: 4.31 M/UL (ref 4.05–5.2)
RBC #/AREA URNS HPF: ABNORMAL /HPF
SAO2 % BLD: 83.9 % (ref 95–98)
SERVICE CMNT-IMP: ABNORMAL
SERVICE CMNT-IMP: ABNORMAL
SODIUM SERPL-SCNC: 143 MMOL/L (ref 136–145)
SP GR UR REFRACTOMETRY: 1.01 (ref 1–1.02)
SPECIMEN TYPE: ABNORMAL
UROBILINOGEN UR QL STRIP.AUTO: 0.2 EU/DL (ref 0.2–1)
WBC # BLD AUTO: 4.7 K/UL (ref 4.3–11.1)
WBC URNS QL MICRO: ABNORMAL /HPF

## 2021-07-23 PROCEDURE — 71260 CT THORAX DX C+: CPT

## 2021-07-23 PROCEDURE — 74011250636 HC RX REV CODE- 250/636: Performed by: EMERGENCY MEDICINE

## 2021-07-23 PROCEDURE — 81001 URINALYSIS AUTO W/SCOPE: CPT

## 2021-07-23 PROCEDURE — 36600 WITHDRAWAL OF ARTERIAL BLOOD: CPT

## 2021-07-23 PROCEDURE — 74011000250 HC RX REV CODE- 250: Performed by: EMERGENCY MEDICINE

## 2021-07-23 PROCEDURE — 71045 X-RAY EXAM CHEST 1 VIEW: CPT

## 2021-07-23 PROCEDURE — 94640 AIRWAY INHALATION TREATMENT: CPT

## 2021-07-23 PROCEDURE — 74011000636 HC RX REV CODE- 636: Performed by: EMERGENCY MEDICINE

## 2021-07-23 PROCEDURE — 80053 COMPREHEN METABOLIC PANEL: CPT

## 2021-07-23 PROCEDURE — 85379 FIBRIN DEGRADATION QUANT: CPT

## 2021-07-23 PROCEDURE — 74011636637 HC RX REV CODE- 636/637: Performed by: EMERGENCY MEDICINE

## 2021-07-23 PROCEDURE — 83735 ASSAY OF MAGNESIUM: CPT

## 2021-07-23 PROCEDURE — 74011000258 HC RX REV CODE- 258: Performed by: EMERGENCY MEDICINE

## 2021-07-23 PROCEDURE — 94664 DEMO&/EVAL PT USE INHALER: CPT

## 2021-07-23 PROCEDURE — 85025 COMPLETE CBC W/AUTO DIFF WBC: CPT

## 2021-07-23 PROCEDURE — 93005 ELECTROCARDIOGRAM TRACING: CPT | Performed by: EMERGENCY MEDICINE

## 2021-07-23 PROCEDURE — 83605 ASSAY OF LACTIC ACID: CPT

## 2021-07-23 PROCEDURE — 82803 BLOOD GASES ANY COMBINATION: CPT

## 2021-07-23 PROCEDURE — 81003 URINALYSIS AUTO W/O SCOPE: CPT

## 2021-07-23 PROCEDURE — 99285 EMERGENCY DEPT VISIT HI MDM: CPT

## 2021-07-23 PROCEDURE — 83880 ASSAY OF NATRIURETIC PEPTIDE: CPT

## 2021-07-23 RX ORDER — ALBUTEROL SULFATE 0.83 MG/ML
2.5 SOLUTION RESPIRATORY (INHALATION)
Qty: 25 EACH | Refills: 2 | Status: ON HOLD | OUTPATIENT
Start: 2021-07-23 | End: 2021-11-07 | Stop reason: SDUPTHER

## 2021-07-23 RX ORDER — SODIUM CHLORIDE 0.9 % (FLUSH) 0.9 %
10 SYRINGE (ML) INJECTION
Status: COMPLETED | OUTPATIENT
Start: 2021-07-23 | End: 2021-07-23

## 2021-07-23 RX ORDER — PREDNISONE 20 MG/1
TABLET ORAL
Qty: 13 TABLET | Refills: 0 | Status: SHIPPED | OUTPATIENT
Start: 2021-07-23 | End: 2021-11-07

## 2021-07-23 RX ORDER — SODIUM CHLORIDE 0.9 % (FLUSH) 0.9 %
5-10 SYRINGE (ML) INJECTION AS NEEDED
Status: DISCONTINUED | OUTPATIENT
Start: 2021-07-23 | End: 2021-07-24 | Stop reason: HOSPADM

## 2021-07-23 RX ORDER — SODIUM CHLORIDE 0.9 % (FLUSH) 0.9 %
5-10 SYRINGE (ML) INJECTION EVERY 8 HOURS
Status: DISCONTINUED | OUTPATIENT
Start: 2021-07-23 | End: 2021-07-24 | Stop reason: HOSPADM

## 2021-07-23 RX ORDER — IPRATROPIUM BROMIDE AND ALBUTEROL SULFATE 2.5; .5 MG/3ML; MG/3ML
3 SOLUTION RESPIRATORY (INHALATION)
Status: COMPLETED | OUTPATIENT
Start: 2021-07-23 | End: 2021-07-23

## 2021-07-23 RX ORDER — SODIUM CHLORIDE 9 MG/ML
200 INJECTION, SOLUTION INTRAVENOUS CONTINUOUS
Status: DISCONTINUED | OUTPATIENT
Start: 2021-07-23 | End: 2021-07-24 | Stop reason: HOSPADM

## 2021-07-23 RX ADMIN — IOPAMIDOL 100 ML: 755 INJECTION, SOLUTION INTRAVENOUS at 20:30

## 2021-07-23 RX ADMIN — SODIUM CHLORIDE 100 ML: 900 INJECTION, SOLUTION INTRAVENOUS at 20:30

## 2021-07-23 RX ADMIN — SODIUM CHLORIDE 200 ML/HR: 900 INJECTION, SOLUTION INTRAVENOUS at 20:46

## 2021-07-23 RX ADMIN — PREDNISONE 60 MG: 10 TABLET ORAL at 21:39

## 2021-07-23 RX ADMIN — Medication 10 ML: at 20:30

## 2021-07-23 RX ADMIN — IPRATROPIUM BROMIDE AND ALBUTEROL SULFATE 3 ML: .5; 3 SOLUTION RESPIRATORY (INHALATION) at 20:06

## 2021-07-23 NOTE — ED PROVIDER NOTES
75-year-old female is brought here by her family. Patient has a history of falls. Also some shortness of breath is increasing. Patient is a little bit confused. She thinks it is Tuesday when it is Friday. She think she had a fall a couple days ago. She states there was no syncope. She states she was standing when her legs gave way and she fell. She thinks she is hit her head in the past.  No loss of consciousness no vomiting. Patient has a history of COPD. States she supposed be on oxygen at night but sometimes wears it during the day as well. She has had no cough recently. Has some occasional fever and chills. Has not would notice wheezing as well. She uses inhaler. Does not have a nebulizer. Slight edema in her legs. She also has a history hypertension and diabetes. History of cholecystectomy and hysterectomy. She denies any vomiting or diarrhea. Denies any particular chest or abdominal pain or headache. Review of records reveals the patient was admitted for pneumonia July 15 for 2 days. Discharged home on prednisone and antibiotic for 3 days. Lives with a grandson. Increasing fatigue over the last week or so. No history of stroke. Patient denies any history of DVT or PE to me as. The history is provided by the patient. Fatigue  This is a new problem. The current episode started more than 2 days ago. The problem has been gradually worsening. There was no focality noted. Pertinent negatives include no focal weakness, no loss of sensation, no loss of balance and no speech difficulty. Associated symptoms include shortness of breath. Pertinent negatives include no chest pain, no vomiting and no headaches. Fall  Pertinent negatives include no fever, no abdominal pain, no vomiting and no headaches. Shortness of Breath  Pertinent negatives include no fever, no headaches, no neck pain, no cough, no chest pain, no vomiting, no abdominal pain and no rash.         Past Medical History: Diagnosis Date    Abnormal glucose level     Anxiety     Back pain     Chronic pain disorder     COPD (chronic obstructive pulmonary disease) (HCC)     Depression     Elevated blood sugar     Elevated IOP     Essential hypertension, benign     Fatigue     Fibromyalgia     Fracture of distal fibula     GERD (gastroesophageal reflux disease)     Ground glass opacity present on imaging of lung     Hyperlipidemia     Hypokalemia     Menopausal problem     Metabolic syndrome     Nausea     Obesity     Osteopenia     URI (upper respiratory infection)        Past Surgical History:   Procedure Laterality Date    COLONOSCOPY N/A 10/13/2016    COL[facility] performed by [facility] at [facility] ENDOSCOPY    HX CHOLECYSTECTOMY      HX HYSTERECTOMY      HX TONSILLECTOMY           Family History:   Problem Relation Age of Onset    Breast Cancer Paternal Grandmother     Other Mother         aneurysm    Hypertension Mother     Cancer Paternal Aunt         brain    Cancer Paternal Uncle         mouth    Heart Disease Paternal Grandfather     Heart Attack Paternal Grandfather        Social History     Socioeconomic History    Marital status:      Spouse name: Not on file    Number of children: Not on file    Years of education: Not on file    Highest education level: Not on file   Occupational History    Not on file   Tobacco Use    Smoking status: Former Smoker     Packs/day: 3.00     Years: 40.00     Pack years: 120.00     Quit date: 2015     Years since quittin.5    Smokeless tobacco: Never Used   Substance and Sexual Activity    Alcohol use: No    Drug use: No    Sexual activity: Not Currently   Other Topics Concern    Not on file   Social History Narrative    Not on file     Social Determinants of Health     Financial Resource Strain:     Difficulty of Paying Living Expenses:    Food Insecurity:     Worried About Running Out of Food in the Last Year:     Vaughn of Food in the Last Year:    Transportation Needs:     Lack of Transportation (Medical):  Lack of Transportation (Non-Medical):    Physical Activity:     Days of Exercise per Week:     Minutes of Exercise per Session:    Stress:     Feeling of Stress :    Social Connections:     Frequency of Communication with Friends and Family:     Frequency of Social Gatherings with Friends and Family:     Attends Ca[employer] Services:     Active Member of Clubs or Organizations:     Attends Club or Organization Meetings:     Marital Status:    Intimate Partner Violence:     Fear of Current or Ex-Partner:     Emotionally Abused:     Physically Abused:     Sexually Abused: ALLERGIES: Patient has no known allergies. Review of Systems   Constitutional: Positive for fatigue. Negative for chills and fever. Respiratory: Positive for shortness of breath. Negative for cough. Cardiovascular: Negative for chest pain and palpitations. Gastrointestinal: Negative for abdominal pain, diarrhea and vomiting. Genitourinary: Negative for dysuria and flank pain. Musculoskeletal: Negative for back pain and neck pain. Skin: Negative for color change and rash. Neurological: Negative for focal weakness, syncope, speech difficulty, headaches and loss of balance. All other systems reviewed and are negative. Vitals:    07/23/21 1802 07/23/21 1832 07/23/21 1832 07/23/21 1900   BP: 132/77  (!) 155/68 (!) 121/59   Pulse: 87  76 84   Resp: 22  24 19   Temp: 98.2 °F (36.8 °C)      SpO2: 94% 96%  96%   Weight: 108.9 kg (240 lb)      Height: 5' 3\" (1.6 m)               Physical Exam  Vitals and nursing note reviewed. Constitutional:       General: She is not in acute distress. Appearance: She is well-developed. HENT:      Head: Normocephalic and atraumatic. Right Ear: External ear normal.      Left Ear: External ear normal.      Mouth/Throat:      Pharynx: No oropharyngeal exudate.    Eyes: Conjunctiva/sclera: Conjunctivae normal.      Pupils: Pupils are equal, round, and reactive to light. Cardiovascular:      Rate and Rhythm: Normal rate and regular rhythm. Heart sounds: No murmur heard. Pulmonary:      Effort: No respiratory distress. Breath sounds: Decreased breath sounds and wheezing present. Abdominal:      General: Bowel sounds are normal.      Palpations: Abdomen is soft. There is no mass. Tenderness: There is no abdominal tenderness. There is no guarding or rebound. Hernia: No hernia is present. Musculoskeletal:      Cervical back: Normal range of motion and neck supple. Right lower leg: No tenderness. No edema. Left lower leg: No tenderness. No edema. Comments: Trace edema above the leg wrappings. No particular tenderness. Skin:     General: Skin is warm and dry. Neurological:      Mental Status: She is alert. GCS: GCS eye subscore is 4. GCS verbal subscore is 4. GCS motor subscore is 6. Comments: Nl speech    Patient perhaps slightly somnolent. Equal . No drift. Can hold either leg off the stretcher for a few seconds. No facial droop   Psychiatric:         Speech: Speech normal.          MDM  Number of Diagnoses or Management Options  Diagnosis management comments: Fatigue and shortness of breath in a patient with recent history of pneumonia. Read check x-ray for effusion, pneumothorax, pneumonia that is worsening. Check for congestive heart failure. Check for dehydration, electrolyte abnormalities. Screen for pulmonary embolism.        Amount and/or Complexity of Data Reviewed  Clinical lab tests: ordered and reviewed  Tests in the radiology section of CPT®: ordered and reviewed  Tests in the medicine section of CPT®: ordered and reviewed  Obtain history from someone other than the patient: yes  Review and summarize past medical records: yes  Independent visualization of images, tracings, or specimens: yes    Risk of Complications, Morbidity, and/or Mortality  Presenting problems: moderate  Diagnostic procedures: minimal  Management options: low    Patient Progress  Patient progress: stable         Procedures      Spoke with daughter. She states patient has not fallen since discharge from the hospital previously. Results Include:    Recent Results (from the past 24 hour(s))   CBC WITH AUTOMATED DIFF    Collection Time: 07/23/21  6:27 PM   Result Value Ref Range    WBC 4.7 4.3 - 11.1 K/uL    RBC 4.31 4.05 - 5.2 M/uL    HGB 12.0 11.7 - 15.4 g/dL    HCT 38.8 35.8 - 46.3 %    MCV 90.0 79.6 - 97.8 FL    MCH 27.8 26.1 - 32.9 PG    MCHC 30.9 (L) 31.4 - 35.0 g/dL    RDW 19.6 (H) 11.9 - 14.6 %    PLATELET 434 (L) 903 - 450 K/uL    MPV 9.9 9.4 - 12.3 FL    ABSOLUTE NRBC 0.00 0.0 - 0.2 K/uL    DF AUTOMATED      NEUTROPHILS 65 43 - 78 %    LYMPHOCYTES 22 13 - 44 %    MONOCYTES 8 4.0 - 12.0 %    EOSINOPHILS 4 0.5 - 7.8 %    BASOPHILS 1 0.0 - 2.0 %    IMMATURE GRANULOCYTES 0 0.0 - 5.0 %    ABS. NEUTROPHILS 3.1 1.7 - 8.2 K/UL    ABS. LYMPHOCYTES 1.0 0.5 - 4.6 K/UL    ABS. MONOCYTES 0.4 0.1 - 1.3 K/UL    ABS. EOSINOPHILS 0.2 0.0 - 0.8 K/UL    ABS. BASOPHILS 0.0 0.0 - 0.2 K/UL    ABS. IMM. GRANS. 0.0 0.0 - 0.5 K/UL   METABOLIC PANEL, COMPREHENSIVE    Collection Time: 07/23/21  6:27 PM   Result Value Ref Range    Sodium 143 136 - 145 mmol/L    Potassium 3.8 3.5 - 5.1 mmol/L    Chloride 103 98 - 107 mmol/L    CO2 34 (H) 21 - 32 mmol/L    Anion gap 6 (L) 7 - 16 mmol/L    Glucose 77 65 - 100 mg/dL    BUN 17 8 - 23 MG/DL    Creatinine 0.54 (L) 0.6 - 1.0 MG/DL    GFR est AA >60 >60 ml/min/1.73m2    GFR est non-AA >60 >60 ml/min/1.73m2    Calcium 8.8 8.3 - 10.4 MG/DL    Bilirubin, total 0.8 0.2 - 1.1 MG/DL    ALT (SGPT) 41 12 - 65 U/L    AST (SGOT) 55 (H) 15 - 37 U/L    Alk.  phosphatase 123 50 - 130 U/L    Protein, total 6.4 6.3 - 8.2 g/dL    Albumin 2.8 (L) 3.2 - 4.6 g/dL    Globulin 3.6 (H) 2.3 - 3.5 g/dL    A-G Ratio 0.8 (L) 1.2 - 3.5 MAGNESIUM    Collection Time: 07/23/21  6:27 PM   Result Value Ref Range    Magnesium 2.0 1.8 - 2.4 mg/dL   LACTIC ACID    Collection Time: 07/23/21  6:27 PM   Result Value Ref Range    Lactic acid 1.8 0.4 - 2.0 MMOL/L   NT-PRO BNP    Collection Time: 07/23/21  6:27 PM   Result Value Ref Range    NT pro- 5 - 125 PG/ML   EKG, 12 LEAD, INITIAL    Collection Time: 07/23/21  6:28 PM   Result Value Ref Range    Ventricular Rate 84 BPM    Atrial Rate 92 BPM    QRS Duration 126 ms    Q-T Interval 340 ms    QTC Calculation (Bezet) 401 ms    Calculated P Axis 53 degrees    Calculated R Axis 44 degrees    Calculated T Axis 51 degrees    Diagnosis       !! AGE AND GENDER SPECIFIC ECG ANALYSIS !!   Sinus rhythm with A-V dissociation and Wide QRS rhythm  Right bundle branch block  Abnormal ECG  When compared with ECG of 30-APR-2000 09:55,  Wide QRS rhythm has replaced Sinus rhythm     URINALYSIS W/ RFLX MICROSCOPIC    Collection Time: 07/23/21  7:30 PM   Result Value Ref Range    Color YELLOW      Appearance CLEAR      Specific gravity 1.007 1.001 - 1.023      pH (UA) 6.0 5.0 - 9.0      Protein Negative NEG mg/dL    Glucose Negative mg/dL    Ketone Negative NEG mg/dL    Bilirubin Negative NEG      Blood Negative NEG      Urobilinogen 0.2 0.2 - 1.0 EU/dL    Nitrites Negative NEG      Leukocyte Esterase TRACE (A) NEG      WBC 0-3 0 /hpf    RBC 0-3 0 /hpf    Epithelial cells 0-3 0 /hpf    Bacteria 0 0 /hpf    Casts 0 0 /lpf   D DIMER    Collection Time: 07/23/21  7:33 PM   Result Value Ref Range    D DIMER 1.57 (H) <0.56 ug/ml(FEU)   BLOOD GAS, ARTERIAL POC    Collection Time: 07/23/21  7:57 PM   Result Value Ref Range    Device: NASAL CANNULA      FIO2 (POC) 28 %    pH (POC) 7.42 7.35 - 7.45      pCO2 (POC) 51.9 (H) 35 - 45 MMHG    pO2 (POC) 49 (L) 75 - 100 MMHG    HCO3 (POC) 33.3 (H) 22 - 26 MMOL/L    sO2 (POC) 83.9 (L) 95 - 98 %    Base excess (POC) 7.4 mmol/L    Allens test (POC) NOT APPLICABLE      Site RIGHT BRACHIAL Specimen type (POC) ARTERIAL      Performed by Andrade (Dee)RT     Critical value read back DRFINN      XR CHEST PORT    Result Date: 7/23/2021  History: Pneumonia, follow-up Exam: portable chest Comparison: 7/15/2021 Findings: No focal alveolar infiltrate or pleural effusion. No change in the appearance of the mediastinal contour or osseous structures. IMPRESSIONs: No acute findings. CT CHEST PULMONARY EMBOLISM    Result Date: 7/23/2021  EXAM: CT Chest with IV contrast - PE protocol. INDICATION: Dyspnea, pain. COMPARISON: Prior CT chest on February 22, 2017. TECHNIQUE: Axial CT images of the chest were obtained after the intravenous injection of 100 mL Isovue 370 CT contrast. Radiation dose reduction techniques were used for this study. Our CT scanners use one or all of the following: Automated exposure control, adjustment of the mA and/or kV according to patient size, iterative reconstruction. FINDINGS: - Pleura/pericardium: Within normal limits. - Lungs: Mild areas of atelectasis or scarring are seen in both lung bases. A few tiny bilateral lung nodules are unchanged. No acute infiltrate is seen. - Shayna/Mediastinum: Within normal limits. - Tracheobronchial tree: Within normal limits. - Aorta/pulmonary arteries: Within normal limits. - Heart: Within normal limits. - Coronary arteries: No coronary artery calcifications. - Chest wall: Within normal limits. - Spine/bones: There are nondisplaced acute or subacute fractures in the anterolateral right fifth, sixth and seventh ribs. - Additional comments: Scans to the upper abdomen demonstrate a cirrhotic liver, ascites and mild splenomegaly. 1. No acute infiltrate or evidence of pulmonary embolism. 2. Acute or subacute fractures in the anterolateral right 5-7th ribs. 3. Cirrhotic liver, with ascites and splenomegaly suggestive of portal hypertension. 9:14 PM  Patient denies any chest or rib pain. Does have some back pain which is chronic.   Patient stands. O2 saturation 92 to 93% on 4 L. Has oxygen concentrator at home that can be increased to 4 L and wear around-the-clock. Prednisone taper over a longer period of time. Do not believe antibiotics are needed. Continue physical therapy at home. We will ask case management to help with patient obtaining nebulizer.

## 2021-07-23 NOTE — ED TRIAGE NOTES
Pt to ED via pov from home for sob, increased weakness over the past week and frequent falls. Pt states about a week ago before she was diagnosed with pneumonia, she was ambulatory. Pt states that she is too weak to walk now. Pt wears 2L oxygen at home. Pt to ED without transport oxygen. Masked.

## 2021-07-24 LAB
ATRIAL RATE: 92 BPM
CALCULATED P AXIS, ECG09: 53 DEGREES
CALCULATED R AXIS, ECG10: 44 DEGREES
CALCULATED T AXIS, ECG11: 51 DEGREES
DIAGNOSIS, 93000: NORMAL
Q-T INTERVAL, ECG07: 340 MS
QRS DURATION, ECG06: 126 MS
QTC CALCULATION (BEZET), ECG08: 401 MS
VENTRICULAR RATE, ECG03: 84 BPM

## 2021-07-24 NOTE — DISCHARGE INSTRUCTIONS
Start prednisone tomorrow. Stay on oxygen around-the-clock, 3 to 4 L. It may be worthwhile to purchase an oximeter that fits on the finger and is battery-powered. Can be purchased for less than $20.  Call your primary care doctor for appointment to follow-up. I will asked the  to contact you regarding getting a nebulizer at home. Continue to use your inhaler at home until then.

## 2021-07-24 NOTE — ED NOTES
I have reviewed discharge instructions with the patient. The patient verbalized understanding. Patient left ED via Discharge Method: wheelchair to Home with family. Opportunity for questions and clarification provided. Patient given 2 scripts. To continue your aftercare when you leave the hospital, you may receive an automated call from our care team to check in on how you are doing. This is a free service and part of our promise to provide the best care and service to meet your aftercare needs.  If you have questions, or wish to unsubscribe from this service please call [phone]. Thank you for Choosing our [facility] Emergency Department.

## 2021-07-24 NOTE — ED NOTES
The pt was stood up out of her bed while remaining on 4 lpm O2 by NC. The pt's sats fell from 97% to 93% while on the O2.

## 2021-07-24 NOTE — PROGRESS NOTES
Results for Eric Godfrey (MRN [id number]) as of 7/23/2021 20:09   Ref. Range 7/23/2021 19:57   pH (POC) Latest Ref Range: 7.35 - 7.45   7.42   pCO2 (POC) Latest Ref Range: 35 - 45 MMHG 51.9 (H)   pO2 (POC) Latest Ref Range: 75 - 100 MMHG 49 (L)   HCO3 (POC) Latest Ref Range: 22 - 26 MMOL/L 33.3 (H)   sO2 (POC) Latest Ref Range: 95 - 98 % 83.9 (L)   Base excess (POC) Latest Units: mmol/L 7.4   FIO2 (POC) Latest Units: % 28   Specimen type (POC) Latest Units:   ARTERIAL   Site Latest Units:   RIGHT BRACHIAL   Device: Latest Units:   NASAL CANNULA   Allens test (POC) Latest Units:   NOT APPLICABLE     Results shown to Dr. Evan Easley. Pt was actually on 3lpm NC (faulty flowmeter). Increased to 4lpm. SpO2 92%. Daughter at bedside. Pt going to cat scan.

## 2021-10-18 ENCOUNTER — HOSPITAL ENCOUNTER (OUTPATIENT)
Dept: PHYSICAL THERAPY | Age: 66
Discharge: HOME | End: 2021-10-18
Payer: MEDICARE

## 2021-10-18 PROCEDURE — 97166 OT EVAL MOD COMPLEX 45 MIN: CPT

## 2021-10-18 NOTE — THERAPY EVALUATION
Humaira Whitaker  : 1955  Primary: [employer] *  Secondary:  [address] at [place] [id number] [facility], [address] [place] [address],[address], [facility] [address].  [phone]   [phone]         OUTPATIENT OCCUPATIONAL THERAPY:Initial Assessment 10/18/2021   ICD-10: Treatment Diagnosis:  Generalized edema (R60.1)  Lymphedema, not elsewhere classified (I89.0)  Precautions/Allergies:   Patient has no known allergies. TREATMENT PLAN:  Effective Dates: 10/18/2021 TO 2022 (90 days). Frequency/Duration: 2 times a week for 90 Day(s) MEDICAL/REFERRING DIAGNOSIS:  Lymphedema [I89.0]   DATE OF ONSET: constant swelling since last November  REFERRING PHYSICIAN: Adwoa Hammer MD MD Orders: Evaluate and treat  Return MD Appointment: unknown     INITIAL ASSESSMENT:  Ms. Rachel Page presents with edema in her LEs and abdomen that is consistent with stage 3 lymphedema and is complicated by frequent infections requiring antibiotics as well as pain that is limiting her ability to complete ADLs and iADLs safely and independently. Feel she may benefit from skilled occupational therapy to maximize independence with home management of lymphedema. PROBLEM LIST (Impacting functional limitations):  1. Decreased Strength  2. Decreased ADL/Functional Activities  3. Increased Pain  4. Edema/Girth  5. Decreased Skin Integrity/Hygeine INTERVENTIONS PLANNED: (Treatment may consist of any combination of the following)  1. Activities of daily living training  2. Adaptive equipment training  3. Manual therapy training  4. Modalities  5. Therapeutic activity  6. Therapeutic exercise     GOALS: (Goals have been discussed and agreed upon with patient.)  Short-Term Functional Goals: Time Frame: 45 days  1. The patient/caregiver will verbalize understanding of lymphedema precautions. 2. Patient will be independent with skin care regimen.    3. The patient/caregiver will be independent at [facility] and doffing bilateral lower extremity compression bandages. 4. The patient/caregiver will be independent with self-manual lymph drainage techniques and show decrease in limb volume. 5. Patient will be independent in lymphatic exercises. Discharge Goals: Time Frame: 90 days  1. Patient's bilateral lower extremity circumferential measurements will decrease on volumetric graph by 1-3 cm to maximize functional use in ADL's.   2. The patient/caregiver will be independent with home management of lymphedema. 3. Patient/caregiver will be independent donning and doffing bilateral lower extremity compression garment. OUTCOME MEASURE:   Tool Used: Lymphedema Life Impact Scale   Score:  Initial: 63 Most Recent: X (Date: -- )   Interpretation of Score: The Lymphedema Life Impact Scale (LLIS) is a validated instrument that measures the physical, functional, and psychosocial concerns pertinent to patients with extremity lymphedema. The Scale's questionnaire is administered to patients to gauge impairments, activity limitations, and participation restrictions resulting from their lymphedema. MEDICAL NECESSITY:   · Patient demonstrates fair rehab potential due to higher previous functional level. REASON FOR SERVICES/OTHER COMMENTS:  · Patient continues to require skilled intervention due to decreased independence with home management of lymphedema. Total Duration:  OT Patient Time In/Time Out  Time In: 1435  Time Out: 1530    Rehabilitation Potential For Stated Goals: [address] Whitaker's therapy, I certify that the treatment plan above will be carried out by a therapist or under their direction.   Thank you for this referral,  Alhaji Chavez OT     Referring Physician Signature: Jeannine Daily MD _______________________________ Date _____________     PAIN/SUBJECTIVE:   Initial: Pain Intensity 1: 10  Pain Location 1: Foot, Leg  Pain Orientation 1: Left, Right   Post Session:  10/10 OCCUPATIONAL PROFILE & HISTORY:   History of Injury/Illness (Reason for Referral):  Patient reports a history of intermittent swelling in LEs that has worsened and become constant since November 2020. She reports infections in her legs requiring antibiotics monthly as well as constant pain in lower body. Past Medical History/Comorbidities:   Ms. Parrish Sanchez  has a past medical history of Abnormal glucose level, Anxiety, Back pain, Chronic pain disorder, COPD (chronic obstructive pulmonary disease) ([address] Depression, Elevated blood sugar, Elevated IOP, Essential hypertension, benign, Fatigue, Fibromyalgia, Fracture of distal fibula, GERD (gastroesophageal reflux disease), Ground glass opacity present on imaging of lung, Hyperlipidemia, Hypokalemia, Menopausal problem, Metabolic syndrome, Nausea, Obesity, Osteopenia, and URI (upper respiratory infection). Ms. Parrish Sanchez  has a past surgical history that includes hx cholecystectomy; hx hysterectomy; hx tonsillectomy; and colonoscopy (N/A, 10/13/2016). 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 Ambulatory/Rehab Services H2 Model Falls Risk Assessment   Risk Factors:       No Risk Factors Identified Ability to Rise from Chair:       (3)  Multiple attempts, but successful   Falls Prevention Plan: Mobility Assistance Device (specify):  Rw   Total: (5 or greater = High Risk): 3   ©2010 [employer]  [facility] Patent #[id number]. Federal Law prohibits the replication, distribution or use without written permission from [facility]   Current Medications:       Current Outpatient Medications:     predniSONE (DELTASONE) 20 mg tablet, 3 PO Day 1-2 2 PO Day 3-4 1 PO day 5-6 1/2 PO day 7-8, Disp: 13 Tablet, Rfl: 0    albuterol (PROVENTIL VENTOLIN) 2.5 mg /3 mL (0.083 %) nebu, 3 mL by Nebulization route every six (6) hours as needed for Wheezing or Shortness of Breath., Disp: 25 Each, Rfl: 2    ALPRAZolam (XANAX) 2 mg tablet, Take 1 Tab by mouth two (2) times daily as needed for Anxiety. Max Daily Amount: 4 mg., Disp: 60 Tab, Rfl: 0    HYDROcodone-acetaminophen (NORCO)  mg tablet, Take 2 Tabs by mouth three (3) times daily. Max Daily Amount: 6 Tabs., Disp: 180 Tab, Rfl: 0    naloxone (NARCAN) 4 mg/actuation nasal spray, 1 Sunol by IntraNASal route once as needed for up to 2 doses. As directed; may repeat every 2-3 mins until ems arrives or patient responds  Indications: OPIATE-INDUCED RESPIRATORY DEPRESSION, If stops breathing from too much pain meds, Disp: 2 Each, Rfl: 1    albuterol (VENTOLIN HFA) 90 mcg/actuation inhaler, Take 2 Puffs by inhalation every four (4) hours as needed for Wheezing., Disp: 1 Inhaler, Rfl: 0    umeclidinium-vilanterol (ANORO ELLIPTA) 62.5-25 mcg/actuation inhaler, Take 1 Puff by inhalation daily. , Disp: 1 Inhaler, Rfl: 0    miSOPROStol (CYTOTEC) 200 mcg tablet, Take 1 Tab by mouth two (2) times a day. To coat stomach., Disp: 180 Tab, Rfl: 3    ziprasidone (GEODON) 20 mg capsule, Take 2 Caps by mouth nightly. Indications: Depression, Disp: 180 Cap, Rfl: 3    promethazine (PHENERGAN) 25 mg tablet, Take 1 Tab by mouth every six (6) hours as needed for Nausea., Disp: 30 Tab, Rfl: 2    Lancets (ACCU-CHEK SOFTCLIX LANCETS) misc, 1 lancet two times daily.  DX: E11.9 Diabetes, Disp: 200 Each, Rfl: 3    glucose blood VI test strips (ACCU-CHEK LASHELL PLUS TEST STRP) strip, 1 strip two times daily. DX E11.9 Diabetes, Disp: 200 Strip, Rfl: 3    Blood-Glucose Meter (ACCU-CHEK LASHELL PLUS METER) [facility], Use meter two times daily to check blood sugars. DX: E11.9 Diabetes, Disp: 1 Each, Rfl: 0    buPROPion SR (WELLBUTRIN SR) 150 mg SR tablet, Take 1 Tab by mouth two (2) times a day., Disp: 180 Tab, Rfl: 0    DULoxetine (CYMBALTA) 60 mg capsule, Take 1 Cap by mouth daily. Take 1 cap PO at bedtime    Indications: FIBROMYALGIA, Disp: 90 Cap, Rfl: 3    omeprazole (PRILOSEC) 40 mg capsule, Take 1 Cap by mouth daily. 30 min before breakfast  Indications: HEARTBURN, To lower stomach acid, Disp: 90 Cap, Rfl: 3    losartan-hydroCHLOROthiazide (HYZAAR) 50-12.5 mg per tablet, Take 1 Tab by mouth daily. Indications: hypertension, To lower blood pressure / Fluid pill, Disp: 90 Tab, Rfl: 3    gabapentin (NEURONTIN) 600 mg tablet, Take 1 Tab by mouth nightly. Indications: NEUROPATHIC PAIN, Disp: 90 Tab, Rfl: 3    ezetimibe-simvastatin (VYTORIN 10/20) 10-20 mg per tablet, Take 1 Tab by mouth nightly. Indications: to lower cholesterol, Disp: 90 Tab, Rfl: 3    potassium chloride (KLOR-CON M10) 10 mEq tablet, Take 1 Tab by mouth daily. , Disp: 90 Tab, Rfl: 3    magnesium oxide 500 mg tab, Take  by mouth., Disp: , Rfl:     mv,Ca,min-folic acid-vit K1 (ONE-A-DAY WOMEN'S 50 PLUS) 400-20 mcg tab, Take 1 Tab by mouth daily. , Disp: 90 Tab, Rfl: 3    aspirin delayed-release 81 mg tablet, Take 81 mg by mouth daily. , Disp: , Rfl:     calcium citrate-vitamin D3 (CITRACAL + D) tablet, Take 2 Tabs by mouth nightly., Disp: , Rfl:     travoprost (TRAVATAN Z) 0.004 % ophthalmic solution, Administer 1 Drop to both eyes every evening., Disp: , Rfl:    Date Last Reviewed:  10/18/2021     Complexity Level: Expanded review of therapy/medical records (1-2):  MODERATE COMPLEXITY   ASSESSMENT OF OCCUPATIONAL PERFORMANCE:   Palpation:         Notable for pitting edema throughout LEs including thighs   ROM:          Limited due to edema  Strength:          Grossly 3+/5  Balance:             · Sitting: Intact  · Standing: With support     Skin Integrity:          LE skin is intact but fragile with a history of recurrent infections with open blisters. Mental Status:             · Neurologic State: Alert  · Orientation Level: Oriented X4  · Cognition: Appropriate decision making  · Perception: Appears intact  · Perseveration: No perseveration noted  · Safety/Judgement: Insight into deficits     Edema/Girth:  pitting    Left Right    Initial Most Recent Initial Most Recent   Upper  Extremity           Lower  Extremity 5th Tuberosity (cm): 24.4  Ankle (cm): 26.2  Calf (cm): 45.9  Mid Knee (cm): 44.3  Thigh (cm): 57.2   5th Tuberosity (cm): 23.8 (19 cm from base of heel)  Ankle (cm): 24.7 (10 cm from base of heel)  Calf (cm): 43.3 (30 cm base of heel)  Mid Knee (cm): 42.6 (40 cm from base of heel)  Thigh (cm): 58.5 (60 cm from base of heel)        Activities of Daily Living:           Basic ADLs (From Assessment) Complex ADLs (From Assessment)   Basic ADL  Feeding: Modified independent  Oral Facial Hygiene/Grooming: Modified Independent  Bathing: Modified independent  Upper Body Dressing: Modified independent  Lower Body Dressing: Moderate assistance  Toileting: Minimum assistance Instrumental ADL  Meal Preparation: Moderate assistance  Homemaking: Total assistance  Medication Management: Maximum assistance  Financial Management: Maximum assistance   Grooming/Bathing/Dressing Activities of Daily Living     Cognitive Retraining  Safety/Judgement: Insight into deficits                 Functional Transfers  Bathroom Mobility: Modified independent  Toilet Transfer : Modified independent  Tub Transfer: Minimum assistance  Car Transfer: Supervision     Bed/Mat Mobility  Rolling: Modified independent  Supine to Sit: Modified independent; Additional time  Sit to Supine: Modified independent; Additional time  Sit to Stand: Modified independent; Additional time  Stand to Sit: Modified independent; Additional time  Bed to Chair: Modified independent; Additional time  Scooting: Modified independent; Additional time     Sensation:         Light touch intact; hypersensitivity noted in LEs   Physical Skills Involved:  1. Range of Motion  2. Strength  3. Sensation  4. Pain (acute)  5. Edema  6. Skin Integrity Cognitive Skills Affected (resulting in the inability to perform in a timely and safe manner):  1. None Psychosocial Skills Affected:  1. Habits/Routines  2. Environmental Adaptation   Number of elements that affect the Plan of Care[de-identified] 5+:  HIGH COMPLEXITY   CLINICAL DECISION MAKING:   Clinical Decision-Making Assessment:  Patient's co-morbidities of COPD and other lung issues as well as recurrent infections and the level of edema up to the waist may all impact patient's ability to progress toward goals.     Assessment process, impact of co-morbidities, assessment modification\need for assistance, and selection of interventions: Analytical Complexity:MODERATE COMPLEXITY

## 2021-10-19 ENCOUNTER — APPOINTMENT (OUTPATIENT)
Dept: PHYSICAL THERAPY | Age: 66
End: 2021-10-19
Payer: MEDICARE

## 2021-10-25 ENCOUNTER — HOSPITAL ENCOUNTER (EMERGENCY)
Age: 66
Discharge: HOME | End: 2021-10-25
Attending: EMERGENCY MEDICINE
Payer: MEDICARE

## 2021-10-25 ENCOUNTER — APPOINTMENT (OUTPATIENT)
Dept: GENERAL RADIOLOGY | Age: 66
End: 2021-10-25
Attending: EMERGENCY MEDICINE
Payer: MEDICARE

## 2021-10-25 VITALS
OXYGEN SATURATION: 93 % | DIASTOLIC BLOOD PRESSURE: 56 MMHG | HEART RATE: 80 BPM | TEMPERATURE: 98.5 F | SYSTOLIC BLOOD PRESSURE: 123 MMHG | RESPIRATION RATE: 20 BRPM

## 2021-10-25 DIAGNOSIS — R60.9 PERIPHERAL EDEMA: Primary | ICD-10-CM

## 2021-10-25 LAB
ALBUMIN SERPL-MCNC: 2.5 G/DL (ref 3.2–4.6)
ALBUMIN/GLOB SERPL: 0.6 {RATIO} (ref 1.2–3.5)
ALP SERPL-CCNC: 128 U/L (ref 50–136)
ALT SERPL-CCNC: 21 U/L (ref 12–65)
ANION GAP SERPL CALC-SCNC: 7 MMOL/L (ref 7–16)
APTT PPP: 39.6 SEC (ref 24.1–35.1)
AST SERPL-CCNC: 34 U/L (ref 15–37)
BASOPHILS # BLD: 0.1 K/UL (ref 0–0.2)
BASOPHILS NFR BLD: 2 % (ref 0–2)
BILIRUB SERPL-MCNC: 0.8 MG/DL (ref 0.2–1.1)
BNP SERPL-MCNC: 522 PG/ML (ref 5–125)
BUN SERPL-MCNC: 24 MG/DL (ref 8–23)
CALCIUM SERPL-MCNC: 8.5 MG/DL (ref 8.3–10.4)
CHLORIDE SERPL-SCNC: 96 MMOL/L (ref 98–107)
CO2 SERPL-SCNC: 31 MMOL/L (ref 21–32)
CREAT SERPL-MCNC: 1.36 MG/DL (ref 0.6–1)
DIFFERENTIAL METHOD BLD: ABNORMAL
EOSINOPHIL # BLD: 0.1 K/UL (ref 0–0.8)
EOSINOPHIL NFR BLD: 3 % (ref 0.5–7.8)
ERYTHROCYTE [DISTWIDTH] IN BLOOD BY AUTOMATED COUNT: 15.8 % (ref 11.9–14.6)
GLOBULIN SER CALC-MCNC: 4.2 G/DL (ref 2.3–3.5)
GLUCOSE SERPL-MCNC: 114 MG/DL (ref 65–100)
HCT VFR BLD AUTO: 29.4 % (ref 35.8–46.3)
HGB BLD-MCNC: 8.7 G/DL (ref 11.7–15.4)
IMM GRANULOCYTES # BLD AUTO: 0 K/UL (ref 0–0.5)
IMM GRANULOCYTES NFR BLD AUTO: 0 % (ref 0–5)
INR PPP: 1.1
LIPASE SERPL-CCNC: 222 U/L (ref 73–393)
LYMPHOCYTES # BLD: 0.7 K/UL (ref 0.5–4.6)
LYMPHOCYTES NFR BLD: 19 % (ref 13–44)
MCH RBC QN AUTO: 28.5 PG (ref 26.1–32.9)
MCHC RBC AUTO-ENTMCNC: 29.6 G/DL (ref 31.4–35)
MCV RBC AUTO: 96.4 FL (ref 79.6–97.8)
MONOCYTES # BLD: 0.4 K/UL (ref 0.1–1.3)
MONOCYTES NFR BLD: 11 % (ref 4–12)
NEUTS SEG # BLD: 2.4 K/UL (ref 1.7–8.2)
NEUTS SEG NFR BLD: 64 % (ref 43–78)
NRBC # BLD: 0 K/UL (ref 0–0.2)
PLATELET # BLD AUTO: 155 K/UL (ref 150–450)
PMV BLD AUTO: 10 FL (ref 9.4–12.3)
POTASSIUM SERPL-SCNC: 4 MMOL/L (ref 3.5–5.1)
PROT SERPL-MCNC: 6.7 G/DL (ref 6.3–8.2)
PROTHROMBIN TIME: 14.3 SEC (ref 12.6–14.5)
RBC # BLD AUTO: 3.05 M/UL (ref 4.05–5.2)
SODIUM SERPL-SCNC: 134 MMOL/L (ref 136–145)
TROPONIN-HIGH SENSITIVITY: 33.3 PG/ML (ref 0–14)
WBC # BLD AUTO: 3.7 K/UL (ref 4.3–11.1)

## 2021-10-25 PROCEDURE — 94640 AIRWAY INHALATION TREATMENT: CPT

## 2021-10-25 PROCEDURE — 77010033678 HC OXYGEN DAILY

## 2021-10-25 PROCEDURE — 84484 ASSAY OF TROPONIN QUANT: CPT

## 2021-10-25 PROCEDURE — 99284 EMERGENCY DEPT VISIT MOD MDM: CPT

## 2021-10-25 PROCEDURE — 96374 THER/PROPH/DIAG INJ IV PUSH: CPT

## 2021-10-25 PROCEDURE — 94664 DEMO&/EVAL PT USE INHALER: CPT

## 2021-10-25 PROCEDURE — 80053 COMPREHEN METABOLIC PANEL: CPT

## 2021-10-25 PROCEDURE — 74011000250 HC RX REV CODE- 250: Performed by: EMERGENCY MEDICINE

## 2021-10-25 PROCEDURE — 83690 ASSAY OF LIPASE: CPT

## 2021-10-25 PROCEDURE — 85025 COMPLETE CBC W/AUTO DIFF WBC: CPT

## 2021-10-25 PROCEDURE — 74011250636 HC RX REV CODE- 250/636: Performed by: EMERGENCY MEDICINE

## 2021-10-25 PROCEDURE — 93005 ELECTROCARDIOGRAM TRACING: CPT | Performed by: EMERGENCY MEDICINE

## 2021-10-25 PROCEDURE — 85730 THROMBOPLASTIN TIME PARTIAL: CPT

## 2021-10-25 PROCEDURE — 71046 X-RAY EXAM CHEST 2 VIEWS: CPT

## 2021-10-25 PROCEDURE — 83880 ASSAY OF NATRIURETIC PEPTIDE: CPT

## 2021-10-25 PROCEDURE — 85610 PROTHROMBIN TIME: CPT

## 2021-10-25 RX ORDER — FUROSEMIDE 20 MG/1
TABLET ORAL
Qty: 3 TABLET | Refills: 0 | Status: SHIPPED | OUTPATIENT
Start: 2021-10-25 | End: 2021-11-07

## 2021-10-25 RX ORDER — ALBUTEROL SULFATE 2.5 MG/.5ML
5 SOLUTION RESPIRATORY (INHALATION)
Status: COMPLETED | OUTPATIENT
Start: 2021-10-25 | End: 2021-10-25

## 2021-10-25 RX ORDER — FUROSEMIDE 10 MG/ML
20 INJECTION INTRAMUSCULAR; INTRAVENOUS
Status: COMPLETED | OUTPATIENT
Start: 2021-10-25 | End: 2021-10-25

## 2021-10-25 RX ADMIN — FUROSEMIDE 20 MG: 20 INJECTION, SOLUTION INTRAMUSCULAR; INTRAVENOUS at 16:02

## 2021-10-25 RX ADMIN — ALBUTEROL SULFATE 5 MG: 2.5 SOLUTION RESPIRATORY (INHALATION) at 15:50

## 2021-10-25 NOTE — ED NOTES
I have reviewed discharge instructions with the patient. The patient verbalized understanding. Patient left ED via Discharge Method: wheelchair to Home with Family. Opportunity for questions and clarification provided. Patient given 1 scripts. To continue your aftercare when you leave the hospital, you may receive an automated call from our care team to check in on how you are doing. This is a free service and part of our promise to provide the best care and service to meet your aftercare needs.  If you have questions, or wish to unsubscribe from this service please call [phone]. Thank you for Choosing our [facility] Emergency Department.

## 2021-10-25 NOTE — DISCHARGE INSTRUCTIONS
No clear reason to keep you in the hospital tonight. You have been given IV Lasix here in the emergency department we can try 3 more days of pills for home. Hopefully this will help get some of the edema off. Follow-up with your care team as an outpatient and return to the ER should something change or worsen.

## 2021-10-25 NOTE — ED PROVIDER NOTES
58-year-old female presenting to the emergency department for worsening lower extremity edema and worsening shortness of breath. Patient uses oxygen regularly at home but over the past few weeks she is noted that she has become more edematous, she is using oxygen more regularly and more frequently, and she is needing to turn up the amount. She has chronic renal insufficiency and congestive heart failure. Primary care doctor has been trying to monitor as an outpatient but symptoms are worsening so referred her to the ER for further evaluation. No fevers or chills. No nausea or vomiting. She is in good spirits at the moment. The history is provided by the patient. Shortness of Breath  This is a recurrent problem. The current episode started more than 1 week ago. The problem has been gradually worsening. Associated symptoms include PND, orthopnea and leg swelling. Pertinent negatives include no fever, no headaches, no coryza, no rhinorrhea, no sore throat, no swollen glands, no ear pain, no neck pain, no cough, no sputum production, no hemoptysis, no wheezing, no chest pain, no syncope, no vomiting, no abdominal pain, no rash, no leg pain and no claudication. The problem's precipitants include medical treatment. She has tried nothing for the symptoms. The treatment provided no relief. She has had prior hospitalizations. She has had prior ED visits. She has had no prior ICU admissions. Associated medical issues include CAD and heart failure.         Past Medical History:   Diagnosis Date    Abnormal glucose level     Anxiety     Back pain     Chronic pain disorder     COPD (chronic obstructive pulmonary disease) (HCC)     Depression     Elevated blood sugar     Elevated IOP     Essential hypertension, benign     Fatigue     Fibromyalgia     Fracture of distal fibula     GERD (gastroesophageal reflux disease)     Ground glass opacity present on imaging of lung     Hyperlipidemia     Hypokalemia  Menopausal problem     Metabolic syndrome     Nausea     Obesity     Osteopenia     URI (upper respiratory infection)        Past Surgical History:   Procedure Laterality Date    COLONOSCOPY N/A 10/13/2016    COL[facility] performed by [facility] at [facility] ENDOSCOPY    HX CHOLECYSTECTOMY      HX HYSTERECTOMY      HX TONSILLECTOMY           Family History:   Problem Relation Age of Onset    Breast Cancer Paternal [de-identified]     Other Mother         aneurysm    Hypertension Mother     Cancer Paternal Aunt         brain    Cancer Paternal Uncle         mouth    Heart Disease Paternal Grandfather     Heart Attack Paternal Grandfather        Social History     Socioeconomic History    Marital status:      Spouse name: Not on file    Number of children: Not on file    Years of education: Not on file    Highest education level: Not on file   Occupational History    Not on file   Tobacco Use    Smoking status: Former Smoker     Packs/day: 3.00     Years: 40.00     Pack years: 120.00     Quit date: 2015     Years since quittin.7    Smokeless tobacco: Never Used   Substance and Sexual Activity    Alcohol use: No    Drug use: No    Sexual activity: Not Currently   Other Topics Concern    Not on file   Social History Narrative    Not on file     Social Determinants of Health     Financial Resource Strain:     Difficulty of Paying Living Expenses:    Food Insecurity:     Worried About Running Out of Food in the Last Year:     [address] in the Last Year:    Transportation Needs:     Lack of Transportation (Medical):      Lack of Transportation (Non-Medical):    Physical Activity:     Days of Exercise per Week:     Minutes of Exercise per Session:    Stress:     Feeling of Stress :    Social Connections:     Frequency of Communication with Friends and Family:     Frequency of Social Gatherings with Friends and Family:     Attends Anabaptism Services:     Active Member of Clubs or Organizations:     Attends Club or Organization Meetings:     Marital Status:    Intimate Partner Violence:     Fear of Current or Ex-Partner:     Emotionally Abused:     Physically Abused:     Sexually Abused: ALLERGIES: Patient has no known allergies. Review of Systems   Constitutional: Negative for fever. HENT: Negative for ear pain, rhinorrhea and sore throat. Respiratory: Positive for shortness of breath. Negative for cough, hemoptysis, sputum production and wheezing. Cardiovascular: Positive for orthopnea, leg swelling and PND. Negative for chest pain, claudication and syncope. Gastrointestinal: Negative for abdominal pain and vomiting. Musculoskeletal: Negative for neck pain. Skin: Negative for rash. Neurological: Negative for headaches. All other systems reviewed and are negative. There were no vitals filed for this visit. Physical Exam  Vitals and nursing note reviewed. Constitutional:       Appearance: She is well-developed. She is obese. She is ill-appearing. HENT:      Head: Normocephalic and atraumatic. Eyes:      Conjunctiva/sclera: Conjunctivae normal.      Pupils: Pupils are equal, round, and reactive to light. Cardiovascular:      Rate and Rhythm: Normal rate and regular rhythm. Pulmonary:      Effort: Pulmonary effort is normal.      Breath sounds: Normal breath sounds. Abdominal:      General: Bowel sounds are normal.      Palpations: Abdomen is soft. Musculoskeletal:         General: Normal range of motion. Cervical back: Neck supple. Right lower leg: Edema present. Left lower leg: Edema present. Skin:     General: Skin is warm and dry. Neurological:      Mental Status: She is alert and oriented to person, place, and time.           MDM  Number of Diagnoses or Management Options  Peripheral edema  Diagnosis management comments: 43-year-old female presenting for evaluation of persistent lower extremity edema. Her primary care doctor had encouraged her to come be evaluated because they were afraid it was getting worse. She has a history of some renal insufficiency so there was some thought that she may need to be evaluated for renal failure.        Amount and/or Complexity of Data Reviewed  Clinical lab tests: ordered and reviewed (Results for orders placed or performed during the hospital encounter of 10/25/21  -PROTHROMBIN TIME + INR       Result                      Value             Ref Range           Prothrombin time            14.3              12.6 - 14.5 *       INR                         1.1                              -PTT       Result                      Value             Ref Range           aPTT                        39.6 (H)          24.1 - 35.1 *  -CBC WITH AUTOMATED DIFF       Result                      Value             Ref Range           WBC                         3.7 (L)           4.3 - 11.1 K*       RBC                         3.05 (L)          4.05 - 5.2 M*       HGB                         8.7 (L)           11.7 - 15.4 *       HCT                         29.4 (L)          35.8 - 46.3 %       MCV                         96.4              79.6 - 97.8 *       MCH                         28.5              26.1 - 32.9 *       MCHC                        29.6 (L)          31.4 - 35.0 *       RDW                         15.8 (H)          11.9 - 14.6 %       PLATELET                    155               150 - 450 K/*       MPV                         10.0              9.4 - 12.3 FL       ABSOLUTE NRBC               0.00              0.0 - 0.2 K/*       DF                          AUTOMATED                             NEUTROPHILS                 64                43 - 78 %           LYMPHOCYTES                 19                13 - 44 %           MONOCYTES                   11                4.0 - 12.0 %        EOSINOPHILS                 3                 0.5 - 7.8 % BASOPHILS                   2                 0.0 - 2.0 %         IMMATURE GRANULOCYTES       0                 0.0 - 5.0 %         ABS. NEUTROPHILS            2.4               1.7 - 8.2 K/*       ABS. LYMPHOCYTES            0.7               0.5 - 4.6 K/*       ABS. MONOCYTES              0.4               0.1 - 1.3 K/*       ABS. EOSINOPHILS            0.1               0.0 - 0.8 K/*       ABS. BASOPHILS              0.1               0.0 - 0.2 K/*       ABS. IMM. GRANS.            0.0               0.0 - 0.5 K/*  -LIPASE       Result                      Value             Ref Range           Lipase                      222               73 - 584 U/L   -METABOLIC PANEL, COMPREHENSIVE       Result                      Value             Ref Range           Sodium                      134 (L)           136 - 145 mm*       Potassium                   4.0               3.5 - 5.1 mm*       Chloride                    96 (L)            98 - 107 mmo*       CO2                         31                21 - 32 mmol*       Anion gap                   7                 7 - 16 mmol/L       Glucose                     114 (H)           65 - 100 mg/*       BUN                         24 (H)            8 - 23 MG/DL        Creatinine                  1.36 (H)          0.6 - 1.0 MG*       GFR est AA                  50 (L)            >60 ml/min/1*       GFR est non-AA              41 (L)            >60 ml/min/1*       Calcium                     8.5               8.3 - 10.4 M*       Bilirubin, total            0.8               0.2 - 1.1 MG*       ALT (SGPT)                  21                12 - 65 U/L         AST (SGOT)                  34                15 - 37 U/L         Alk.  phosphatase            128               50 - 136 U/L        Protein, total              6.7               6.3 - 8.2 g/*       Albumin                     2.5 (L)           3.2 - 4.6 g/*       Globulin                    4.2 (H)           2.3 - 3.5 g/*       A-G Ratio                   0.6 (L)           1.2 - 3.5      -NT-PRO BNP       Result                      Value             Ref Range           NT pro-BNP                  522 (H)           5 - 125 PG/ML  -TROPONIN-HIGH SENSITIVITY       Result                      Value             Ref Range           Troponin-High Sensitiv*     33.3 (H)          0 - 14 pg/mL   -EKG, 12 LEAD, INITIAL       Result                      Value             Ref Range           Ventricular Rate            82                BPM                 Atrial Rate                 82                BPM                 P-R Interval                170               ms                  QRS Duration                128               ms                  Q-T Interval                400               ms                  QTC Calculation (Bezet)     467               ms                  Calculated P Axis           75                degrees             Calculated R Axis           66                degrees             Calculated T Axis           30                degrees             Diagnosis                                                     Normal sinus rhythm   Right bundle branch block   Abnormal ECG   No previous ECGs available     )  Tests in the radiology section of CPT®: ordered and reviewed (XR CHEST PA LAT    Result Date: 10/25/2021  PA AND LATERAL CHEST X-RAY. Clinical Indication: Shortness breath, leg swelling Comparison: Chest x-ray dated 7/23/2021 Findings: 2 views of the chest submitted demonstrate the lungs to be expanded and clear. There is no airspace consolidation. There is no pleural effusion. Degenerative spondylosis is noted in the lower thoracic spine. The cardiac silhouette is stable.      No acute cardiopulmonary abnormality.    )  Tests in the medicine section of CPT®: ordered and reviewed  Decide to obtain previous medical records or to obtain history from someone other than the patient: yes  Independent visualization of images, tracings, or specimens: yes    Risk of Complications, Morbidity, and/or Mortality  Presenting problems: high  Diagnostic procedures: high  Management options: moderate  General comments: Patient has remained hemodynamically stable. Blood work is roughly at her baseline. BNP is only 500. Creatinine is climbed somewhat but is still only 1.3. Treated with IV Lasix here in the department and sending home with 3 days of oral Lasix at 20 mg. She has follow-up with a vein specialist in a few days and can get follow-up with her primary care doctor fairly quickly. Should symptoms change or worsen she can return to the emergency department for further evaluation currently I do not see a clear reason to have the patient hospitalized given that most of this is simply a bit worse than normal and has been going on for over a year. I personally reviewed the patient's vital signs, laboratory tests, and/or radiological findings. I discussed these findings with the patient and their significance. I answered all questions and gave the patient clear return precautions. The patient was discharged from the emergency department in stable condition        Patient Progress  Patient progress: improved    ED Course as of Oct 25 1615   Mon Oct 25, 2021   1532 Patient had bilateral venous duplex 4 months ago for similar situation that was unremarkable.     [JS]      ED Course User Index  [JS] Christen Duncan MD       Procedures

## 2021-10-25 NOTE — ED TRIAGE NOTES
Patient arrives via EMS. Masked. NAD. Patient reports BLE swelling for one year. Reports going to pcp 2 weeks ago for worsening swelling. Patient reports wearing 2L NC chronically. Increasing falls over past several days. Reports concern for [place]. Triaged during system downtime. EKG performed and given to Dr. Amelia Rivera in triage. Orders to be placed by lab.

## 2021-10-26 LAB
ATRIAL RATE: 82 BPM
CALCULATED P AXIS, ECG09: 75 DEGREES
CALCULATED R AXIS, ECG10: 66 DEGREES
CALCULATED T AXIS, ECG11: 30 DEGREES
DIAGNOSIS, 93000: NORMAL
P-R INTERVAL, ECG05: 170 MS
Q-T INTERVAL, ECG07: 400 MS
QRS DURATION, ECG06: 128 MS
QTC CALCULATION (BEZET), ECG08: 467 MS
VENTRICULAR RATE, ECG03: 82 BPM

## 2021-10-27 ENCOUNTER — HOSPITAL ENCOUNTER (OUTPATIENT)
Dept: PHYSICAL THERAPY | Age: 66
Discharge: HOME | End: 2021-10-27
Payer: MEDICARE

## 2021-10-27 PROCEDURE — 97535 SELF CARE MNGMENT TRAINING: CPT

## 2021-10-27 NOTE — PROGRESS NOTES
[address]  : 1955  Primary: [employer] *  Secondary:  [address] at [address], [address] [place] [address],[address], [place] [address].  [phone]   [phone]       OUTPATIENT OCCUPATIONAL THERAPY: Daily Treatment Note 10/27/2021  Visit Count:  2    ICD-10: Treatment Diagnosis:  Generalized edema (R60.1)  Lymphedema, not elsewhere classified (I89.0)  Precautions/Allergies:   Patient has no known allergies. TREATMENT PLAN:  Effective Dates: 10/18/2021 TO 2022 (90 days). Frequency/Duration: 2 times a week for 90 Day(s)    Pre-treatment Symptoms/Complaints:  Patient reports she has swelling in her hands and abdomen as well as her legs. Pain: Initial: Pain Intensity 1: 10  Pain Location 1: Back  Pain Orientation 1: Lower  Post Session:  No change   Medications Last Reviewed:  10/27/2021  Updated Objective Findings:  See below:   Edema/Girth:    Left Right    Initial Most Recent Initial Most Recent   Upper  Extremity           Lower  Extremity                TREATMENT:     Self Care: (40 minutes): Procedure(s) (per grid) utilized to improve and/or restore self-care/home management as related to lymphedema management. Required maximal visual, verbal and tactile cueing to facilitate activities of daily living skills. Patient's LE's wrapped from toes to just below the popliteal crease with Transelast Classic toe bandages then stockinette and padding then short-stretch compression bandages; initiated education with patient and her daughter re: compression bandaging and wearing them until her next visit on Friday morning unless she experiences pain or increased shortness of breath then remove immediately and bring bandaging supplies back to her next visit; also educated re: HEP of ankle pumps 5-10 reps several times throughout the day; she verbalized and/or demonstrated understanding of all education.    [email or web address]  Treatment/Session Summary: · Response to Treatment: Patient initially tolerating compression bandages without complaint. Will continue to work on increasing independence with changing compression bandages at home with assist from family members between visits as needed at next session. · Communication/Consultation:  Encouraged patient to continue communication with her doctors re: swelling extending to other areas of the body; she is agreeable. · Equipment provided today:  Compression bandaging supplies  · Recommendations/Intent for next treatment session: Next visit will focus on increasing independence with home management of lymphedema.  .    Total Treatment Billable Duration:  40 minutes  OT Patient Time In/Time Out  Time In: 1396  Time Out: [address], OT    Future Appointments   Date Time Provider Popeye Fontana   10/29/2021  8:00 AM Kaylee Brown OT [facility]     Visit # Therapist initials Date Arrived NS/ Cx < 24 hr >24 hr Cx Visit Comments   1 SP 10/18 x   24 Visits Approved  Date Range: 10/25/21 to 1/16/22    2 SP 10/27 x                                                                                                                                                        Abbreviations: NS = No Show; CX = cancelled

## 2021-10-29 ENCOUNTER — APPOINTMENT (OUTPATIENT)
Dept: GENERAL RADIOLOGY | Age: 66
DRG: 291 | End: 2021-10-29
Attending: EMERGENCY MEDICINE
Payer: MEDICARE

## 2021-10-29 ENCOUNTER — HOSPITAL ENCOUNTER (INPATIENT)
Dept: NON INVASIVE DIAGNOSTICS | Age: 66
Discharge: HOME | DRG: 291 | End: 2021-10-29
Payer: MEDICARE

## 2021-10-29 ENCOUNTER — HOSPITAL ENCOUNTER (OUTPATIENT)
Dept: PHYSICAL THERAPY | Age: 66
Discharge: HOME | End: 2021-10-29
Payer: MEDICARE

## 2021-10-29 ENCOUNTER — HOSPITAL ENCOUNTER (INPATIENT)
Age: 66
LOS: 9 days | Discharge: HOME HEALTH | DRG: 291 | End: 2021-11-07
Attending: EMERGENCY MEDICINE | Admitting: FAMILY MEDICINE
Payer: MEDICARE

## 2021-10-29 DIAGNOSIS — E78.2 MIXED HYPERLIPIDEMIA: ICD-10-CM

## 2021-10-29 DIAGNOSIS — F41.9 ANXIETY: ICD-10-CM

## 2021-10-29 DIAGNOSIS — J96.11 CHRONIC HYPOXEMIC RESPIRATORY FAILURE (HCC): ICD-10-CM

## 2021-10-29 DIAGNOSIS — I50.9 ACUTE CONGESTIVE HEART FAILURE, UNSPECIFIED HEART FAILURE TYPE (HCC): Primary | ICD-10-CM

## 2021-10-29 DIAGNOSIS — R60.0 BILATERAL LOWER EXTREMITY EDEMA: ICD-10-CM

## 2021-10-29 DIAGNOSIS — K74.60 CIRRHOSIS OF LIVER WITH ASCITES, UNSPECIFIED HEPATIC CIRRHOSIS TYPE (HCC): ICD-10-CM

## 2021-10-29 DIAGNOSIS — Z72.0 TOBACCO ABUSE: Chronic | ICD-10-CM

## 2021-10-29 DIAGNOSIS — R18.8 CIRRHOSIS OF LIVER WITH ASCITES, UNSPECIFIED HEPATIC CIRRHOSIS TYPE (HCC): ICD-10-CM

## 2021-10-29 DIAGNOSIS — E66.01 MORBID OBESITY (HCC): ICD-10-CM

## 2021-10-29 DIAGNOSIS — K74.60 CIRRHOSIS (HCC): ICD-10-CM

## 2021-10-29 DIAGNOSIS — E78.5 HYPERLIPIDEMIA, UNSPECIFIED HYPERLIPIDEMIA TYPE: ICD-10-CM

## 2021-10-29 DIAGNOSIS — I50.31 ACUTE DIASTOLIC CHF (CONGESTIVE HEART FAILURE) (HCC): ICD-10-CM

## 2021-10-29 DIAGNOSIS — J96.20 ACUTE ON CHRONIC RESPIRATORY FAILURE, UNSPECIFIED WHETHER WITH HYPOXIA OR HYPERCAPNIA (HCC): ICD-10-CM

## 2021-10-29 DIAGNOSIS — J44.9 CHRONIC OBSTRUCTIVE PULMONARY DISEASE, UNSPECIFIED COPD TYPE (HCC): Chronic | ICD-10-CM

## 2021-10-29 DIAGNOSIS — R60.1 ANASARCA: ICD-10-CM

## 2021-10-29 LAB
ALBUMIN SERPL-MCNC: 2.7 G/DL (ref 3.2–4.6)
ALBUMIN/GLOB SERPL: 0.7 {RATIO} (ref 1.2–3.5)
ALP SERPL-CCNC: 137 U/L (ref 50–130)
ALT SERPL-CCNC: 24 U/L (ref 12–65)
ANION GAP SERPL CALC-SCNC: 4 MMOL/L (ref 7–16)
ARTERIAL PATENCY WRIST A: ABNORMAL
AST SERPL-CCNC: 33 U/L (ref 15–37)
ATRIAL RATE: 91 BPM
BASE EXCESS BLD CALC-SCNC: 7.5 MMOL/L
BDY SITE: ABNORMAL
BILIRUB SERPL-MCNC: 0.8 MG/DL (ref 0.2–1.1)
BNP SERPL-MCNC: 549 PG/ML (ref 5–125)
BUN SERPL-MCNC: 28 MG/DL (ref 8–23)
CALCIUM SERPL-MCNC: 8.9 MG/DL (ref 8.3–10.4)
CALCULATED P AXIS, ECG09: 52 DEGREES
CALCULATED R AXIS, ECG10: 34 DEGREES
CALCULATED T AXIS, ECG11: 22 DEGREES
CHLORIDE SERPL-SCNC: 97 MMOL/L (ref 98–107)
CK MB CFR SERPL CALC: 1.6 %
CK MB SERPL-MCNC: 1.3 NG/ML (ref 1.5–3.6)
CK SERPL-CCNC: 80 U/L (ref 21–215)
CO2 BLD-SCNC: 33 MMOL/L (ref 13–23)
CO2 SERPL-SCNC: 31 MMOL/L (ref 21–32)
CREAT SERPL-MCNC: 0.96 MG/DL (ref 0.6–1)
DIAGNOSIS, 93000: NORMAL
DIFFERENTIAL METHOD BLD: ABNORMAL
ECHO AO ROOT DIAM: 3.2 CM
ECHO AV AREA PEAK VELOCITY: 2.62 CM2
ECHO AV AREA VTI: 3.2 CM2
ECHO AV AREA/BSA PEAK VELOCITY: 1.2 CM2/M2
ECHO AV AREA/BSA VTI: 1.5 CM2/M2
ECHO AV MEAN GRADIENT: 11 MMHG
ECHO AV PEAK GRADIENT: 21 MMHG
ECHO AV PEAK VELOCITY: 231 CM/S
ECHO AV VTI: 41.1 CM
ECHO EST RA PRESSURE: 15 MMHG
ECHO LA AREA 2C: 20.1 CM2
ECHO LA AREA 4C: 19.8 CM2
ECHO LA MAJOR AXIS: 5.9 CM
ECHO LA MINOR AXIS: 2.77 CM
ECHO LV E' LATERAL VELOCITY: 6.02 CM/S
ECHO LV E' SEPTAL VELOCITY: 8.69 CM/S
ECHO LV EDV A2C: 129 CM3
ECHO LV EDV A4C: 138 CM3
ECHO LV ESV A2C: 32 CM3
ECHO LV ESV A4C: 37 CM3
ECHO LV INTERNAL DIMENSION DIASTOLIC: 4.3 CM (ref 3.9–5.3)
ECHO LV INTERNAL DIMENSION SYSTOLIC: 3.37 CM
ECHO LV IVSD: 1.14 CM (ref 0.6–0.9)
ECHO LV MASS 2D: 165.1 G (ref 67–162)
ECHO LV MASS INDEX 2D: 77.5 G/M2 (ref 43–95)
ECHO LV POSTERIOR WALL DIASTOLIC: 1.08 CM (ref 0.6–0.9)
ECHO LVOT DIAM: 2.2 CM
ECHO LVOT PEAK GRADIENT: 10 MMHG
ECHO LVOT VTI: 34.6 CM
ECHO MV A VELOCITY: 105 CM/S
ECHO MV E DECELERATION TIME (DT): 219 MS
ECHO MV E VELOCITY: 151 CM/S
ECHO MV E/A RATIO: 1.44
ECHO MV E/E' LATERAL: 25.08
ECHO MV E/E' RATIO (AVERAGED): 21.23
ECHO MV E/E' SEPTAL: 17.38
ECHO MV MEAN GRADIENT: 5 MMHG
ECHO MV VTI: 46 CM
ECHO PV ACCELERATION TIME (AT): 140 MS
ECHO PV PEAK INSTANTANEOUS GRADIENT SYSTOLIC: 8 MMHG
ECHO RIGHT VENTRICULAR SYSTOLIC PRESSURE (RVSP): 34 MMHG
ECHO RV INTERNAL DIMENSION: 4.2 CM
ECHO TV REGURGITANT MAX VELOCITY: 217 CM/S
ECHO TV REGURGITANT PEAK GRADIENT: 19 MMHG
ERYTHROCYTE [DISTWIDTH] IN BLOOD BY AUTOMATED COUNT: 15.7 % (ref 11.9–14.6)
GAS FLOW.O2 O2 DELIVERY SYS: ABNORMAL L/MIN
GLOBULIN SER CALC-MCNC: 4 G/DL (ref 2.3–3.5)
GLUCOSE SERPL-MCNC: 132 MG/DL (ref 65–100)
HCO3 BLD-SCNC: 33.1 MMOL/L (ref 22–26)
HCT VFR BLD AUTO: 28.5 % (ref 35.8–46.3)
HGB BLD-MCNC: 8.6 G/DL (ref 11.7–15.4)
INR PPP: 1.1
LACTATE SERPL-SCNC: 1.5 MMOL/L (ref 0.4–2)
MAGNESIUM SERPL-MCNC: 2.1 MG/DL (ref 1.8–2.4)
MCH RBC QN AUTO: 28.2 PG (ref 26.1–32.9)
MCHC RBC AUTO-ENTMCNC: 30.2 G/DL (ref 31.4–35)
MCV RBC AUTO: 93.4 FL (ref 79.6–97.8)
NRBC # BLD: 0 K/UL (ref 0–0.2)
O2/TOTAL GAS SETTING VFR VENT: 36 %
P-R INTERVAL, ECG05: 148 MS
PCO2 BLD: 51.4 MMHG (ref 35–45)
PH BLD: 7.42 [PH] (ref 7.35–7.45)
PLATELET # BLD AUTO: 151 K/UL (ref 150–450)
PMV BLD AUTO: 10.1 FL (ref 9.4–12.3)
PO2 BLD: 79 MMHG (ref 75–100)
POTASSIUM SERPL-SCNC: 3.8 MMOL/L (ref 3.5–5.1)
PROCALCITONIN SERPL-MCNC: <0.05 NG/ML
PROT SERPL-MCNC: 6.7 G/DL (ref 6.3–8.2)
PROTHROMBIN TIME: 14.5 SEC (ref 12.6–14.5)
Q-T INTERVAL, ECG07: 334 MS
QRS DURATION, ECG06: 126 MS
QTC CALCULATION (BEZET), ECG08: 410 MS
RBC # BLD AUTO: 3.05 M/UL (ref 4.05–5.2)
SAO2 % BLD: 95.4 % (ref 95–98)
SERVICE CMNT-IMP: ABNORMAL
SODIUM SERPL-SCNC: 132 MMOL/L (ref 136–145)
SPECIMEN TYPE: ABNORMAL
TROPONIN-HIGH SENSITIVITY: 17.5 PG/ML (ref 0–14)
TROPONIN-HIGH SENSITIVITY: 19.2 PG/ML (ref 0–14)
VENTRICULAR RATE, ECG03: 91 BPM
WBC # BLD AUTO: 3.9 K/UL (ref 4.3–11.1)

## 2021-10-29 PROCEDURE — 85610 PROTHROMBIN TIME: CPT

## 2021-10-29 PROCEDURE — 74011250637 HC RX REV CODE- 250/637: Performed by: PHYSICIAN ASSISTANT

## 2021-10-29 PROCEDURE — 74011250636 HC RX REV CODE- 250/636: Performed by: EMERGENCY MEDICINE

## 2021-10-29 PROCEDURE — 83605 ASSAY OF LACTIC ACID: CPT

## 2021-10-29 PROCEDURE — 96374 THER/PROPH/DIAG INJ IV PUSH: CPT

## 2021-10-29 PROCEDURE — 93005 ELECTROCARDIOGRAM TRACING: CPT | Performed by: EMERGENCY MEDICINE

## 2021-10-29 PROCEDURE — 83880 ASSAY OF NATRIURETIC PEPTIDE: CPT

## 2021-10-29 PROCEDURE — 99285 EMERGENCY DEPT VISIT HI MDM: CPT

## 2021-10-29 PROCEDURE — 84484 ASSAY OF TROPONIN QUANT: CPT

## 2021-10-29 PROCEDURE — 82553 CREATINE MB FRACTION: CPT

## 2021-10-29 PROCEDURE — 94640 AIRWAY INHALATION TREATMENT: CPT

## 2021-10-29 PROCEDURE — 94664 DEMO&/EVAL PT USE INHALER: CPT

## 2021-10-29 PROCEDURE — 77010033678 HC OXYGEN DAILY

## 2021-10-29 PROCEDURE — 94762 N-INVAS EAR/PLS OXIMTRY CONT: CPT

## 2021-10-29 PROCEDURE — 74011250636 HC RX REV CODE- 250/636: Performed by: PHYSICIAN ASSISTANT

## 2021-10-29 PROCEDURE — C8929 TTE W OR WO FOL WCON,DOPPLER: HCPCS

## 2021-10-29 PROCEDURE — 84145 PROCALCITONIN (PCT): CPT

## 2021-10-29 PROCEDURE — 74011000250 HC RX REV CODE- 250: Performed by: PHYSICIAN ASSISTANT

## 2021-10-29 PROCEDURE — 85025 COMPLETE CBC W/AUTO DIFF WBC: CPT

## 2021-10-29 PROCEDURE — 36600 WITHDRAWAL OF ARTERIAL BLOOD: CPT

## 2021-10-29 PROCEDURE — 94760 N-INVAS EAR/PLS OXIMETRY 1: CPT

## 2021-10-29 PROCEDURE — 71045 X-RAY EXAM CHEST 1 VIEW: CPT

## 2021-10-29 PROCEDURE — 80053 COMPREHEN METABOLIC PANEL: CPT

## 2021-10-29 PROCEDURE — 83735 ASSAY OF MAGNESIUM: CPT

## 2021-10-29 PROCEDURE — 82803 BLOOD GASES ANY COMBINATION: CPT

## 2021-10-29 PROCEDURE — 74011250637 HC RX REV CODE- 250/637: Performed by: FAMILY MEDICINE

## 2021-10-29 PROCEDURE — 65270000029 HC RM PRIVATE

## 2021-10-29 PROCEDURE — 74011250636 HC RX REV CODE- 250/636: Performed by: FAMILY MEDICINE

## 2021-10-29 RX ORDER — ONDANSETRON 4 MG/1
4 TABLET, ORALLY DISINTEGRATING ORAL
Status: DISCONTINUED | OUTPATIENT
Start: 2021-10-29 | End: 2021-11-07 | Stop reason: HOSPADM

## 2021-10-29 RX ORDER — POLYETHYLENE GLYCOL 3350 17 G/17G
17 POWDER, FOR SOLUTION ORAL DAILY PRN
Status: DISCONTINUED | OUTPATIENT
Start: 2021-10-29 | End: 2021-11-07 | Stop reason: HOSPADM

## 2021-10-29 RX ORDER — ASPIRIN 81 MG/1
81 TABLET ORAL DAILY
Status: DISCONTINUED | OUTPATIENT
Start: 2021-10-29 | End: 2021-11-07 | Stop reason: HOSPADM

## 2021-10-29 RX ORDER — NALOXONE HYDROCHLORIDE 0.4 MG/ML
0.1 INJECTION, SOLUTION INTRAMUSCULAR; INTRAVENOUS; SUBCUTANEOUS ONCE
Status: COMPLETED | OUTPATIENT
Start: 2021-10-29 | End: 2021-10-29

## 2021-10-29 RX ORDER — DULOXETIN HYDROCHLORIDE 60 MG/1
60 CAPSULE, DELAYED RELEASE ORAL DAILY
Status: DISCONTINUED | OUTPATIENT
Start: 2021-10-30 | End: 2021-11-07 | Stop reason: HOSPADM

## 2021-10-29 RX ORDER — ACETAMINOPHEN 325 MG/1
650 TABLET ORAL
Status: DISCONTINUED | OUTPATIENT
Start: 2021-10-29 | End: 2021-11-07 | Stop reason: HOSPADM

## 2021-10-29 RX ORDER — FUROSEMIDE 10 MG/ML
40 INJECTION INTRAMUSCULAR; INTRAVENOUS 2 TIMES DAILY
Status: DISCONTINUED | OUTPATIENT
Start: 2021-10-29 | End: 2021-11-03

## 2021-10-29 RX ORDER — ACETAMINOPHEN 650 MG/1
650 SUPPOSITORY RECTAL
Status: DISCONTINUED | OUTPATIENT
Start: 2021-10-29 | End: 2021-11-07 | Stop reason: HOSPADM

## 2021-10-29 RX ORDER — ALPRAZOLAM 0.5 MG/1
1 TABLET ORAL
Status: DISCONTINUED | OUTPATIENT
Start: 2021-10-29 | End: 2021-11-04

## 2021-10-29 RX ORDER — ARFORMOTEROL TARTRATE 15 UG/2ML
15 SOLUTION RESPIRATORY (INHALATION)
Status: DISCONTINUED | OUTPATIENT
Start: 2021-10-29 | End: 2021-11-07 | Stop reason: HOSPADM

## 2021-10-29 RX ORDER — ONDANSETRON 2 MG/ML
4 INJECTION INTRAMUSCULAR; INTRAVENOUS
Status: DISCONTINUED | OUTPATIENT
Start: 2021-10-29 | End: 2021-11-07 | Stop reason: HOSPADM

## 2021-10-29 RX ORDER — ENOXAPARIN SODIUM 100 MG/ML
40 INJECTION SUBCUTANEOUS EVERY 12 HOURS
Status: DISCONTINUED | OUTPATIENT
Start: 2021-10-29 | End: 2021-11-07 | Stop reason: HOSPADM

## 2021-10-29 RX ORDER — HYDROCODONE BITARTRATE AND ACETAMINOPHEN 10; 325 MG/1; MG/1
2 TABLET ORAL 3 TIMES DAILY
Status: DISCONTINUED | OUTPATIENT
Start: 2021-10-29 | End: 2021-10-29

## 2021-10-29 RX ORDER — GABAPENTIN 300 MG/1
600 CAPSULE ORAL
Status: DISCONTINUED | OUTPATIENT
Start: 2021-10-29 | End: 2021-11-07 | Stop reason: HOSPADM

## 2021-10-29 RX ORDER — IPRATROPIUM BROMIDE AND ALBUTEROL SULFATE 2.5; .5 MG/3ML; MG/3ML
3 SOLUTION RESPIRATORY (INHALATION)
Status: DISCONTINUED | OUTPATIENT
Start: 2021-10-29 | End: 2021-11-07 | Stop reason: HOSPADM

## 2021-10-29 RX ORDER — FUROSEMIDE 10 MG/ML
40 INJECTION INTRAMUSCULAR; INTRAVENOUS
Status: COMPLETED | OUTPATIENT
Start: 2021-10-29 | End: 2021-10-29

## 2021-10-29 RX ORDER — IBUPROFEN 200 MG
1 TABLET ORAL DAILY
Status: DISCONTINUED | OUTPATIENT
Start: 2021-10-29 | End: 2021-11-07 | Stop reason: HOSPADM

## 2021-10-29 RX ORDER — SODIUM CHLORIDE 0.9 % (FLUSH) 0.9 %
5-40 SYRINGE (ML) INJECTION EVERY 8 HOURS
Status: DISCONTINUED | OUTPATIENT
Start: 2021-10-29 | End: 2021-11-07 | Stop reason: HOSPADM

## 2021-10-29 RX ORDER — ALPRAZOLAM 0.5 MG/1
2 TABLET ORAL
Status: DISCONTINUED | OUTPATIENT
Start: 2021-10-29 | End: 2021-10-29

## 2021-10-29 RX ORDER — PANTOPRAZOLE SODIUM 40 MG/1
40 TABLET, DELAYED RELEASE ORAL
Status: DISCONTINUED | OUTPATIENT
Start: 2021-10-30 | End: 2021-11-07 | Stop reason: HOSPADM

## 2021-10-29 RX ORDER — LOSARTAN POTASSIUM 50 MG/1
50 TABLET ORAL DAILY
Status: DISCONTINUED | OUTPATIENT
Start: 2021-10-30 | End: 2021-11-07 | Stop reason: HOSPADM

## 2021-10-29 RX ORDER — BUPROPION HYDROCHLORIDE 150 MG/1
150 TABLET, EXTENDED RELEASE ORAL 2 TIMES DAILY
Status: DISCONTINUED | OUTPATIENT
Start: 2021-10-30 | End: 2021-11-07 | Stop reason: HOSPADM

## 2021-10-29 RX ORDER — LATANOPROST 50 UG/ML
1 SOLUTION/ DROPS OPHTHALMIC
Status: DISCONTINUED | OUTPATIENT
Start: 2021-10-29 | End: 2021-11-07 | Stop reason: HOSPADM

## 2021-10-29 RX ORDER — HYDROCODONE BITARTRATE AND ACETAMINOPHEN 10; 325 MG/1; MG/1
1 TABLET ORAL ONCE
Status: COMPLETED | OUTPATIENT
Start: 2021-10-29 | End: 2021-10-29

## 2021-10-29 RX ORDER — HYDROCODONE BITARTRATE AND ACETAMINOPHEN 10; 325 MG/1; MG/1
1 TABLET ORAL
Status: DISCONTINUED | OUTPATIENT
Start: 2021-10-29 | End: 2021-11-07 | Stop reason: HOSPADM

## 2021-10-29 RX ORDER — SODIUM CHLORIDE 0.9 % (FLUSH) 0.9 %
5-40 SYRINGE (ML) INJECTION AS NEEDED
Status: DISCONTINUED | OUTPATIENT
Start: 2021-10-29 | End: 2021-11-07 | Stop reason: HOSPADM

## 2021-10-29 RX ORDER — BUDESONIDE 0.5 MG/2ML
500 INHALANT ORAL
Status: DISCONTINUED | OUTPATIENT
Start: 2021-10-29 | End: 2021-11-07 | Stop reason: HOSPADM

## 2021-10-29 RX ADMIN — PERFLUTREN 2 ML: 6.52 INJECTION, SUSPENSION INTRAVENOUS at 02:00

## 2021-10-29 RX ADMIN — ALPRAZOLAM 2 MG: 0.5 TABLET ORAL at 10:49

## 2021-10-29 RX ADMIN — METHYLPREDNISOLONE SODIUM SUCCINATE 125 MG: 125 INJECTION, POWDER, FOR SOLUTION INTRAMUSCULAR; INTRAVENOUS at 11:59

## 2021-10-29 RX ADMIN — ENOXAPARIN SODIUM 40 MG: 100 INJECTION SUBCUTANEOUS at 21:28

## 2021-10-29 RX ADMIN — HYDROCODONE BITARTRATE AND ACETAMINOPHEN 1 TABLET: 10; 325 TABLET ORAL at 10:49

## 2021-10-29 RX ADMIN — FUROSEMIDE 40 MG: 10 INJECTION, SOLUTION INTRAMUSCULAR; INTRAVENOUS at 09:27

## 2021-10-29 RX ADMIN — BUDESONIDE 500 MCG: 0.5 INHALANT RESPIRATORY (INHALATION) at 21:00

## 2021-10-29 RX ADMIN — IPRATROPIUM BROMIDE AND ALBUTEROL SULFATE 3 ML: .5; 3 SOLUTION RESPIRATORY (INHALATION) at 21:00

## 2021-10-29 RX ADMIN — Medication 10 ML: at 21:28

## 2021-10-29 RX ADMIN — IPRATROPIUM BROMIDE AND ALBUTEROL SULFATE 3 ML: .5; 3 SOLUTION RESPIRATORY (INHALATION) at 12:14

## 2021-10-29 RX ADMIN — IPRATROPIUM BROMIDE AND ALBUTEROL SULFATE 3 ML: .5; 3 SOLUTION RESPIRATORY (INHALATION) at 15:53

## 2021-10-29 RX ADMIN — BUDESONIDE 500 MCG: 0.5 INHALANT RESPIRATORY (INHALATION) at 12:14

## 2021-10-29 RX ADMIN — ARFORMOTEROL TARTRATE 15 MCG: 15 SOLUTION RESPIRATORY (INHALATION) at 12:14

## 2021-10-29 RX ADMIN — LATANOPROST 1 DROP: 50 SOLUTION OPHTHALMIC at 21:28

## 2021-10-29 RX ADMIN — NALOXONE HYDROCHLORIDE 0.1 MG: 0.4 INJECTION, SOLUTION INTRAMUSCULAR; INTRAVENOUS; SUBCUTANEOUS at 15:56

## 2021-10-29 RX ADMIN — FUROSEMIDE 40 MG: 10 INJECTION, SOLUTION INTRAMUSCULAR; INTRAVENOUS at 17:32

## 2021-10-29 RX ADMIN — Medication 81 MG: at 10:49

## 2021-10-29 RX ADMIN — ALPRAZOLAM 1 MG: 0.5 TABLET ORAL at 23:29

## 2021-10-29 RX ADMIN — METHYLPREDNISOLONE SODIUM SUCCINATE 60 MG: 40 INJECTION, POWDER, FOR SOLUTION INTRAMUSCULAR; INTRAVENOUS at 17:34

## 2021-10-29 NOTE — PROGRESS NOTES
Care Management Interventions  PCP Verified by CM: Yes Obdulia Johnson MD)  Mode of Transport at Discharge:  (family)  Transition of Care Consult (CM Consult): Discharge Planning  Support Systems: Child(luciana) (Nury/dtr)  Confirm Follow Up Transport: Family  The Patient and/or Patient Representative was Provided with a Choice of Provider and Agrees with the Discharge Plan?: Yes  Freedom of Choice List was Provided with Basic Dialogue that Supports the Patient's Individualized Plan of Care/Goals, Treatment Preferences and Shares the Quality Data Associated with the Providers?: Yes  Discharge Location  Discharge Placement: Unable to determine at this time  Visited with pt to establish prior to admission baseline and discuss their anticipated goals at time of d/c. Pt came in c/o edema, anasarca  and SOB since yesterday. Pt unable to carry on a conversation for very long, she kept falling asleep. Pt is current with [facility], but canceled this a.m. Pt states that she lives with her dtr/Nury, she is inde with ADL's, with a walker. Has home O2 24/7 @ 2L  , pt struggled to remember how much. Pt with Hx of CHF and COPD with ongoing tobacco use. CM to follow and therapy to see when pt can tolerate.

## 2021-10-29 NOTE — H&P
Hospitalist History and Physical   Admit Date:  10/29/2021  8:02 AM   Name:  Divina Peralta   Age:  77 y.o. Sex:  female  :  1955   MRN:  [id number]   Room:      Presenting Complaint: Shortness of Breath and Leg Swelling    Reason(s) for Admission: Acute exacerbation of CHF (congestive heart failure) ([facility] [I50.9]     History of Present Illness:   Divina Peralta is a 77 y.o. female with medical history of chronic hypoxemic respiratory failure (4L via NC at baseline), COPD with ongoing tobacco abuse, HTN, chronic back pain, chronic edema with probable CHF who presented with increasing dyspnea and edema. She was evaluated in the ER 10/25/2021 for the same presentation and noted to have increased symptoms > 2 weeks. Pt has underlying CHF (no echo on record) and admits to compliance with lasix. BNP was elevated 522 with trop 33.3 though cxray without gross abnl. Pt was given IV lasix and then dc'd from the ER with a 3-day prescription of lasix though daughter reports she already had the same prescription at home. Over the past couple of days, pt has had increased orthopnea, [place], edema / anasarca and admits to increased abdominal girth. She presented back to the ER today and cxray now revealed increased pulm vascular markings with BNP slightly increased to 549 though troponin levels improved at 19.2. Given her worsening pulmonary symptoms worsened by her underlying COPD which is now in exacerbation, Hospitalist consulted by ER for inpatient admission. Of significance pt denies hx of CHF and in her family hx, only cardiac hx is hypertension in her mother and MI in her paternal grandfather. Review of Systems:  10 systems reviewed and negative except as noted in HPI.     Assessment & Plan:   Principal Problem:     Acute exacerbation of CHF (congestive heart failure)   - lasix 40mg IV bid  - fluid restrict, daily weighs, strict I&O  - no record of echo in [facility] and [address] [place] [address] Everywhere\"; echo ordered  - will cycle through another set of cardiac enzymes though this appears improving  - may need cardiac eval    Active Problems:    Acute COPD exacerbation  - IV solumedrol   - duonebs q4hrs  - brovana / pulmicort nebs bid  - add LAMA   - REALLY NEEDS TO STOP SMOKING!!!       Anasarca / edema  - cont diuretics with fluid restrictions      Chronic hypoxemic resp failure  - in setting of COPD and probable CHF  - cont supplemental O2 4L (baseline requirements)      Lumbago  - cont norco as dosed  - PT/OT eval      Morbid morbid obesity  - BMP 44.29 which adds to medical complexity and higher mortality risks  - really needs to lose weight      HTN  - resume losartan  - hold HCTZ for now as pt getting lasix IV bid  - monitor          Dispo/Discharge Planning:   - anticipate at least 2 midnight hospitalization    Diet: ADULT DIET Regular; No Salt Added (3-4 gm); 1000 ml  VTE ppx: lovenox  Code status: Full Code    Hospital Problems as of 10/29/2021 Date Reviewed: 7/15/2021        Codes Class Noted - Resolved POA    Acute exacerbation of CHF (congestive heart failure) (HCC) ICD-10-CM: I50.9  ICD-9-CM: 428.0  10/29/2021 - Present Unknown              Past History:  Past Medical History:   Diagnosis Date    Abnormal glucose level     Anxiety     Back pain     Chronic pain disorder     COPD (chronic obstructive pulmonary disease) (HCC)     Depression     Elevated blood sugar     Elevated IOP     Essential hypertension, benign     Fatigue     Fibromyalgia     Fracture of distal fibula     GERD (gastroesophageal reflux disease)     Ground glass opacity present on imaging of lung     Hyperlipidemia     Hypokalemia     Menopausal problem     Metabolic syndrome     Nausea     Obesity     Osteopenia     URI (upper respiratory infection)      Past Surgical History:   Procedure Laterality Date    COLONOSCOPY N/A 10/13/2016    COLONOSCOPY performed by Ankit Muir MD at [facility] ENDOSCOPY  HX CHOLECYSTECTOMY      HX HYSTERECTOMY      HX TONSILLECTOMY        No Known Allergies   Social History     Tobacco Use    Smoking status: Former Smoker     Packs/day: 3.00     Years: 40.00     Pack years: 120.00     Quit date: 2015     Years since quittin.7    Smokeless tobacco: Never Used   Substance Use Topics    Alcohol use: No      Family History   Problem Relation Age of Onset    Breast Cancer Paternal Grandmother     Other Mother         aneurysm    Hypertension Mother     Cancer Paternal Aunt         brain    Cancer Paternal Uncle         mouth    Heart Disease Paternal Grandfather     Heart Attack Paternal Grandfather       Family history reviewed and negative except as otherwise noted. Immunization History   Administered Date(s) Administered    Influenza High Dose Vaccine PF 10/30/2015    Influenza Vaccine 2016    Influenza Vaccine (Quad) PF (>6 Mo Flulaval, Fluarix, and >3 Yrs Afluria, Fluzone 03137) 2016    Pneumococcal Polysaccharide (PPSV-23) 2011    Tdap 2016    Zoster Vaccine, Live 2016     Prior to Admit Medications:  Current Outpatient Medications   Medication Instructions    albuterol (PROVENTIL VENTOLIN) 2.5 mg, Nebulization, EVERY 6 HOURS AS NEEDED    albuterol (VENTOLIN HFA) 90 mcg/actuation inhaler 2 Puffs, Inhalation, EVERY 4 HOURS AS NEEDED    ALPRAZolam (XANAX) 2 mg, Oral, 2 TIMES DAILY AS NEEDED    aspirin delayed-release 81 mg, Oral, DAILY    Blood-Glucose Meter (ACCU-CHEK LASHELL PLUS METER) misc Use meter two times daily to check blood sugars.  DX: E11.9 Diabetes    buPROPion SR (WELLBUTRIN SR) 150 mg, Oral, 2 TIMES DAILY    calcium citrate-vitamin D3 (CITRACAL + D) tablet 2 Tablets, Oral, EVERY BEDTIME    DULoxetine (CYMBALTA) 60 mg, Oral, DAILY, Take 1 cap PO at bedtime      ezetimibe-simvastatin (VYTORIN 10/20) 10-20 mg per tablet 1 Tablet, Oral, EVERY BEDTIME    furosemide (LASIX) 20 mg tablet Take 1 tablet daily for the next 3 days.  gabapentin (NEURONTIN) 600 mg, Oral, EVERY BEDTIME    glucose blood VI test strips (ACCU-CHEK LASHELL PLUS TEST STRP) strip 1 strip two times daily. DX E11.9 Diabetes    HYDROcodone-acetaminophen (NORCO)  mg tablet 2 Tablets, Oral, 3 TIMES DAILY    Lancets (ACCU-CHEK SOFTCLIX LANCETS) misc 1 lancet two times daily. DX: E11.9 Diabetes    losartan-hydroCHLOROthiazide (HYZAAR) 50-12.5 mg per tablet 1 Tablet, Oral, DAILY    magnesium oxide 500 mg tab Oral    miSOPROStoL (CYTOTEC) 200 mcg, Oral, 2 TIMES DAILY, To coat stomach.     mv,Ca,min-folic acid-vit K1 (ONE-A-DAY WOMEN'S 50 PLUS) 400-20 mcg tab 1 Tablet, Oral, DAILY    naloxone (NARCAN) 4 mg/actuation nasal spray 1 Spray, IntraNASal, ONCE PRN, As directed; may repeat every 2-3 mins until ems arrives or patient responds    omeprazole (PRILOSEC) 40 mg, Oral, DAILY, 30 min before breakfast    potassium chloride (KLOR-CON M10) 10 mEq tablet 10 mEq, Oral, DAILY    predniSONE (DELTASONE) 20 mg tablet 3 PO Day 1-2<BR>2 PO Day 3-4<BR>1 PO day 5-6<BR>1/2 PO day 7-8    promethazine (PHENERGAN) 25 mg, Oral, EVERY 6 HOURS AS NEEDED    travoprost (TRAVATAN Z) 0.004 % ophthalmic solution 1 Drop, Both Eyes, EVERY EVENING    umeclidinium-vilanterol (ANORO ELLIPTA) 62.5-25 mcg/actuation inhaler 1 Puff, Inhalation, DAILY    ziprasidone (GEODON) 40 mg, Oral, EVERY BEDTIME       Objective:     Patient Vitals for the past 24 hrs:   Temp Pulse Resp BP SpO2   10/29/21 1215     96 %   10/29/21 1200  89 10 (!) 120/59 95 %   10/29/21 1130  83 12 (!) 108/52 95 %   10/29/21 1027  87  (!) 136/57 94 %   10/29/21 0930  92  (!) 149/67 95 %   10/29/21 0927  90  (!) 142/59    10/29/21 0900  92  (!) 142/59 97 %   10/29/21 0834     95 %   10/29/21 0830  92  131/63 97 %   10/29/21 0809 98.8 °F (37.1 °C) 94 30 (!) 132/58 (!) 67 %     Oxygen Therapy  O2 Sat (%): 96 % (10/29/21 1215)  Pulse via Oximetry: 86 beats per minute (10/29/21 1215)  O2 Device: Nasal cannula (10/29/21 1215)  Skin Assessment: Other (see comment/note) (10/29/21 7619)  O2 Flow Rate (L/min): 4 l/min (10/29/21 1215)    Estimated body mass index is 44.29 kg/m² as calculated from the following:    Height as of this encounter: 5' 3\" (1.6 m). Weight as of this encounter: 113.4 kg (250 lb). No intake or output data in the 24 hours ending 10/29/21 1233      Physical Exam:    Blood pressure (!) 120/59, pulse 89, temperature 98.8 °F (37.1 °C), resp. rate 10, height 5' 3\" (1.6 m), weight 113.4 kg (250 lb), SpO2 96 %. General:    Morbid morbid obesity; dyspnea noted  Head:  Normocephalic, atraumatic  Eyes:  Sclerae appear normal.  Pupils equally round. ENT:  Nares appear normal, no drainage. Moist oral mucosa  Neck:  Neck OBESE, trachea grossly appears midline   CV:   RRR. No m/r/g. No jugular venous distension. Lungs:   Rhonchi with wheezing b/l; rales at bases with some accessory muscles in use  Abdomen:   Anasarca; abd tight with pitting edema  Extremities: + mvmt x 4; 3+ edema b/l LE  Skin:     No rashes and normal coloration. Warm and dry. Neuro:  CN II-XII grossly intact. Sensation intact. A&Ox3  Psych:  Normal mood and affect. I have reviewed ordered lab tests and independently visualized imaging below:    Last 24hr Labs:  Recent Results (from the past 24 hour(s))   EKG, 12 LEAD, INITIAL    Collection Time: 10/29/21  8:28 AM   Result Value Ref Range    Ventricular Rate 91 BPM    Atrial Rate 91 BPM    P-R Interval 148 ms    QRS Duration 126 ms    Q-T Interval 334 ms    QTC Calculation (Bezet) 410 ms    Calculated P Axis 52 degrees    Calculated R Axis 34 degrees    Calculated T Axis 22 degrees    Diagnosis       !! AGE AND GENDER SPECIFIC ECG ANALYSIS !!   Normal sinus rhythm  Right bundle branch block  Nonspecific ST abnormality  Abnormal ECG  When compared with ECG of 25-OCT-2021 12:04,  QT has shortened  Confirmed by Stanley Coleman MD (), BLANCA NERI ([id number]) on 10/29/2021 10:46:16 AM     CBC WITH AUTOMATED DIFF    Collection Time: 10/29/21  8:29 AM   Result Value Ref Range    WBC 3.9 (L) 4.3 - 11.1 K/uL    RBC 3.05 (L) 4.05 - 5.2 M/uL    HGB 8.6 (L) 11.7 - 15.4 g/dL    HCT 28.5 (L) 35.8 - 46.3 %    MCV 93.4 79.6 - 97.8 FL    MCH 28.2 26.1 - 32.9 PG    MCHC 30.2 (L) 31.4 - 35.0 g/dL    RDW 15.7 (H) 11.9 - 14.6 %    PLATELET 286 202 - 167 K/uL    MPV 10.1 9.4 - 12.3 FL    ABSOLUTE NRBC 0.00 0.0 - 0.2 K/uL    DF AUTOMATED     METABOLIC PANEL, COMPREHENSIVE    Collection Time: 10/29/21  8:29 AM   Result Value Ref Range    Sodium 132 (L) 136 - 145 mmol/L    Potassium 3.8 3.5 - 5.1 mmol/L    Chloride 97 (L) 98 - 107 mmol/L    CO2 31 21 - 32 mmol/L    Anion gap 4 (L) 7 - 16 mmol/L    Glucose 132 (H) 65 - 100 mg/dL    BUN 28 (H) 8 - 23 MG/DL    Creatinine 0.96 0.6 - 1.0 MG/DL    GFR est AA >60 >60 ml/min/1.73m2    GFR est non-AA >60 >60 ml/min/1.73m2    Calcium 8.9 8.3 - 10.4 MG/DL    Bilirubin, total 0.8 0.2 - 1.1 MG/DL    ALT (SGPT) 24 12 - 65 U/L    AST (SGOT) 33 15 - 37 U/L    Alk.  phosphatase 137 (H) 50 - 130 U/L    Protein, total 6.7 6.3 - 8.2 g/dL    Albumin 2.7 (L) 3.2 - 4.6 g/dL    Globulin 4.0 (H) 2.3 - 3.5 g/dL    A-G Ratio 0.7 (L) 1.2 - 3.5     LACTIC ACID    Collection Time: 10/29/21  8:29 AM   Result Value Ref Range    Lactic acid 1.5 0.4 - 2.0 MMOL/L   PROTHROMBIN TIME + INR    Collection Time: 10/29/21  8:29 AM   Result Value Ref Range    Prothrombin time 14.5 12.6 - 14.5 sec    INR 1.1     PROCALCITONIN    Collection Time: 10/29/21  8:29 AM   Result Value Ref Range    Procalcitonin <0.05 ng/mL   TROPONIN-HIGH SENSITIVITY    Collection Time: 10/29/21  8:29 AM   Result Value Ref Range    Troponin-High Sensitivity 19.2 (H) 0 - 14 pg/mL   MAGNESIUM    Collection Time: 10/29/21  8:29 AM   Result Value Ref Range    Magnesium 2.1 1.8 - 2.4 mg/dL   NT-PRO BNP    Collection Time: 10/29/21  8:29 AM   Result Value Ref Range    NT pro- (H) 5 - 125 PG/ML       All Micro Results     None          Other Studies:  XR CHEST PORT    Result Date: 10/29/2021  AP PORTABLE CHEST X-RAY 10/29/2021 9:10 AM HISTORY: dyspnea  ER 11:-shortness of breath and generalized swelling since yesterday COMPARISON: October 25, 2021 FINDINGS:  The cardiac silhouette is prominent. Lung volumes are diminished. EKG leads are present. Interstitial markings are mildly thickened in the middle and lower lung zones. Prominent cardiac silhouette with interstitial densities in the lower lung zones. This may be caused by volume overload/pulmonary edema.       Medications Administered     albuterol-ipratropium (DUO-NEB) 2.5 MG-0.5 MG/3 ML     Admin Date  10/29/2021 Action  Given Dose  3 mL Route  Nebulization Administered By  RT Joi          ALPRAZolam [place]) tablet 2 mg     Admin Date  10/29/2021 Action  Given Dose  2 mg Route  Oral Administered By  Ren Suarez RN          arformoteroL (BROVANA) neb solution 15 mcg     Admin Date  10/29/2021 Action  Given Dose  15 mcg Route  Nebulization Administered By  RT Joi          aspirin delayed-release tablet 81 mg     Admin Date  10/29/2021 Action  Given Dose  81 mg Route  Oral Administered By  Ren Suarez RN          budesonide (PULMICORT) 500 mcg/2 ml nebulizer suspension     Admin Date  10/29/2021 Action  Given Dose  500 mcg Route  Nebulization Administered By  RT Joi          furosemide (LASIX) injection 40 mg     Admin Date  10/29/2021 Action  Given Dose  40 mg Route  IntraVENous Administered By  Terrell Restrepo RN          HYDROcodone-acetaminophen [facility] [place] [place])  mg tablet 1 Tablet     Admin Date  10/29/2021 Action  Given Dose  1 Tablet Route  Oral Administered By  Ren Suarez RN          methylPREDNISolone (PF) (Solu-MEDROL) injection 125 mg     Admin Date  10/29/2021 Action  Given Dose  125 mg Route  IntraVENous Administered By  Ren Suarez RN                Signed:  LEILANI Castro    Part of this note may have been written by using a voice dictation software. The note has been proof read but may still contain some grammatical/other typographical errors.

## 2021-10-29 NOTE — ACP (ADVANCE CARE PLANNING)
[facility] Hospitalist Service  At the heart of better care     Advance Care Planning   Admit Date:  10/29/2021  8:02 AM   Name:  Ceci Olivia   Age:  77 y.o. Sex:  female  :  1955   MRN:  [id number]   Room:  [facility] is able to make her own decisions: Yes    POA/surrogate decision maker if patient is altered:  Daughter    Other members present in the meeting:   ER RN    Patient / surrogate decision-maker directed:  Code Status: FULL CODE -full aggressive medical and surgical interventions, including intubations, resuscitations, pressors, artificial tube feeding        Time spent: 15 minutes in direct discussion (face to face and/or over phone).     Signed:  LEILANI Ventura

## 2021-10-29 NOTE — ED PROVIDER NOTES
Patient presents ER with family was with concerns over increased swelling and shortness of breath. Reports swelling has been prominent in her legs but is since gone up to her abdomen over the past week. States she was seen at ER last week for similar complaints. Also reports worsening shortness of breath. Denies any fevers. Reports occasional chills. Denies cough. Denies any vomiting. The history is provided by the patient. Shortness of Breath  This is a new problem. The problem occurs frequently. The current episode started more than 2 days ago. The problem has been gradually worsening. Associated symptoms include orthopnea, abdominal pain, leg pain and leg swelling. Pertinent negatives include no fever, no coryza, no rhinorrhea, no sputum production, no hemoptysis, no wheezing, no chest pain and no syncope. Associated medical issues include chronic lung disease.         Past Medical History:   Diagnosis Date    Abnormal glucose level     Anxiety     Back pain     Chronic pain disorder     COPD (chronic obstructive pulmonary disease) (HCC)     Depression     Elevated blood sugar     Elevated IOP     Essential hypertension, benign     Fatigue     Fibromyalgia     Fracture of distal fibula     GERD (gastroesophageal reflux disease)     Ground glass opacity present on imaging of lung     Hyperlipidemia     Hypokalemia     Menopausal problem     Metabolic syndrome     Nausea     Obesity     Osteopenia     URI (upper respiratory infection)        Past Surgical History:   Procedure Laterality Date    COLONOSCOPY N/A 10/13/2016    COLONOSCOPY performed by Juan Malik [facility] at [facility] ENDOSCOPY    HX CHOLECYSTECTOMY      HX HYSTERECTOMY      HX TONSILLECTOMY           Family History:   Problem Relation Age of Onset    Breast Cancer Paternal Grandmother     Other Mother         aneurysm    Hypertension Mother     Cancer Paternal Aunt         brain    Cancer Paternal Uncle         mouth  Heart Disease Paternal Grandfather     Heart Attack Paternal Grandfather        Social History     Socioeconomic History    Marital status:      Spouse name: Not on file    Number of children: Not on file    Years of education: Not on file    Highest education level: Not on file   Occupational History    Not on file   Tobacco Use    Smoking status: Former Smoker     Packs/day: 3.00     Years: 40.00     Pack years: 120.00     Quit date: 2015     Years since quittin.7    Smokeless tobacco: Never Used   Substance and Sexual Activity    Alcohol use: No    Drug use: No    Sexual activity: Not Currently   Other Topics Concern    Not on file   Social History Narrative    Not on file     Social Determinants of Health     Financial Resource Strain:     Difficulty of Paying Living Expenses:    Food Insecurity:     Worried About Running Out of Food in the Last Year:     Ran Out of Food in the Last Year:    Transportation Needs:     Lack of Transportation (Medical):  Lack of Transportation (Non-Medical):    Physical Activity:     Days of Exercise per Week:     Minutes of Exercise per Session:    Stress:     Feeling of Stress :    Social Connections:     Frequency of Communication with Friends and Family:     Frequency of Social Gatherings with Friends and Family:     Attends Jehovah's witness Services:     Active Member of Clubs or Organizations:     Attends Club or Organization Meetings:     Marital Status:    Intimate Partner Violence:     Fear of Current or Ex-Partner:     Emotionally Abused:     Physically Abused:     Sexually Abused: ALLERGIES: Patient has no known allergies. Review of Systems   Constitutional: Positive for fatigue. Negative for fever and unexpected weight change. HENT: Negative for congestion, dental problem, rhinorrhea, trouble swallowing and voice change. Eyes: Negative for visual disturbance. Respiratory: Positive for shortness of breath. Negative for hemoptysis, sputum production, chest tightness and wheezing. Cardiovascular: Positive for orthopnea and leg swelling. Negative for chest pain, palpitations and syncope. Gastrointestinal: Positive for abdominal pain. Negative for blood in stool, constipation and diarrhea. Endocrine: Negative for polydipsia and polyphagia. Genitourinary: Negative for pelvic pain and urgency. Musculoskeletal: Negative for back pain, gait problem and neck stiffness. Skin: Positive for color change. Negative for pallor. Neurological: Positive for weakness and light-headedness. Negative for tremors, syncope and speech difficulty. Hematological: Negative for adenopathy. Does not bruise/bleed easily. Psychiatric/Behavioral: Negative for decreased concentration, dysphoric mood, hallucinations, self-injury and sleep disturbance. All other systems reviewed and are negative. Vitals:    10/29/21 0809   BP: (!) 132/58   Pulse: 94   Resp: 30   Temp: 98.8 °F (37.1 °C)   SpO2: (!) 67%   Weight: 113.4 kg (250 lb)   Height: 5' 3\" (1.6 m)            Physical Exam  Vitals and nursing note reviewed. Constitutional:       Appearance: Normal appearance. She is obese. HENT:      Head: Normocephalic and atraumatic. Right Ear: External ear normal.      Left Ear: External ear normal.      Nose: Nose normal. No rhinorrhea. Mouth/Throat:      Mouth: Mucous membranes are moist.      Pharynx: No oropharyngeal exudate or posterior oropharyngeal erythema. Eyes:      Extraocular Movements: Extraocular movements intact. Pupils: Pupils are equal, round, and reactive to light. Cardiovascular:      Rate and Rhythm: Regular rhythm. Tachycardia present. Pulses: Normal pulses. Pulmonary:      Effort: Pulmonary effort is normal. No respiratory distress. Breath sounds: No stridor. Rales present. No rhonchi. Abdominal:      General: Abdomen is flat. There is no distension.       Palpations: Abdomen is soft. There is no mass. Tenderness: There is no abdominal tenderness. Musculoskeletal:         General: No swelling. Cervical back: Normal range of motion and neck supple. No tenderness. Right lower leg: Edema present. Left lower leg: Edema present. Skin:     General: Skin is warm. Capillary Refill: Capillary refill takes less than 2 seconds. Coloration: Skin is not jaundiced or pale. Findings: Erythema present. Neurological:      General: No focal deficit present. Mental Status: She is alert and oriented to person, place, and time. Cranial Nerves: No cranial nerve deficit. Sensory: No sensory deficit. Coordination: Coordination normal.   Psychiatric:         Mood and Affect: Mood normal.         Behavior: Behavior normal.          MDM  Number of Diagnoses or Management Options  Diagnosis management comments: Morbidly obese patient. Edema up to her abdomen. Concern for worsening heart failure. 9:51 AM  Chest x-ray consistent with heart failure. Hemoglobin 8.6. Chemistry panel fairly stable. Currently on 4 L nasal cannula. Given degree of anasarca as well as CHF findings on chest x-ray plan to admit to the hospitalist for diuresis.        Amount and/or Complexity of Data Reviewed  Clinical lab tests: ordered and reviewed  Tests in the radiology section of CPT®: ordered and reviewed  Review and summarize past medical records: yes  Discuss the patient with other providers: yes  Independent visualization of images, tracings, or specimens: yes (EKG interpretation: Sinus rhythm, rate of 91, right bundle branch block, no ST segment changes noted, comparison EKG performed October 25, 2021)    Risk of Complications, Morbidity, and/or Mortality  Presenting problems: high  Diagnostic procedures: moderate  Management options: high    Patient Progress  Patient progress: stable         Procedures          Results Include:    Recent Results (from the past 24 hour(s))   EKG, 12 LEAD, INITIAL    Collection Time: 10/29/21  8:28 AM   Result Value Ref Range    Ventricular Rate 91 BPM    Atrial Rate 91 BPM    P-R Interval 148 ms    QRS Duration 126 ms    Q-T Interval 334 ms    QTC Calculation (Bezet) 410 ms    Calculated P Axis 52 degrees    Calculated R Axis 34 degrees    Calculated T Axis 22 degrees    Diagnosis       !! AGE AND GENDER SPECIFIC ECG ANALYSIS !! Normal sinus rhythm  Right bundle branch block  Septal infarct , age undetermined  Abnormal ECG  When compared with ECG of 25-OCT-2021 12:04,  Septal infarct is now Present  QT has shortened     CBC WITH AUTOMATED DIFF    Collection Time: 10/29/21  8:29 AM   Result Value Ref Range    WBC 3.9 (L) 4.3 - 11.1 K/uL    RBC 3.05 (L) 4.05 - 5.2 M/uL    HGB 8.6 (L) 11.7 - 15.4 g/dL    HCT 28.5 (L) 35.8 - 46.3 %    MCV 93.4 79.6 - 97.8 FL    MCH 28.2 26.1 - 32.9 PG    MCHC 30.2 (L) 31.4 - 35.0 g/dL    RDW 15.7 (H) 11.9 - 14.6 %    PLATELET 006 324 - 296 K/uL    MPV 10.1 9.4 - 12.3 FL    ABSOLUTE NRBC 0.00 0.0 - 0.2 K/uL    DF AUTOMATED     METABOLIC PANEL, COMPREHENSIVE    Collection Time: 10/29/21  8:29 AM   Result Value Ref Range    Sodium 132 (L) 136 - 145 mmol/L    Potassium 3.8 3.5 - 5.1 mmol/L    Chloride 97 (L) 98 - 107 mmol/L    CO2 31 21 - 32 mmol/L    Anion gap 4 (L) 7 - 16 mmol/L    Glucose 132 (H) 65 - 100 mg/dL    BUN 28 (H) 8 - 23 MG/DL    Creatinine 0.96 0.6 - 1.0 MG/DL    GFR est AA >60 >60 ml/min/1.73m2    GFR est non-AA >60 >60 ml/min/1.73m2    Calcium 8.9 8.3 - 10.4 MG/DL    Bilirubin, total 0.8 0.2 - 1.1 MG/DL    ALT (SGPT) 24 12 - 65 U/L    AST (SGOT) 33 15 - 37 U/L    Alk.  phosphatase 137 (H) 50 - 130 U/L    Protein, total 6.7 6.3 - 8.2 g/dL    Albumin 2.7 (L) 3.2 - 4.6 g/dL    Globulin 4.0 (H) 2.3 - 3.5 g/dL    A-G Ratio 0.7 (L) 1.2 - 3.5     LACTIC ACID    Collection Time: 10/29/21  8:29 AM   Result Value Ref Range    Lactic acid 1.5 0.4 - 2.0 MMOL/L   PROTHROMBIN TIME + INR    Collection Time: 10/29/21  8:29 AM   Result Value Ref Range    Prothrombin time 14.5 12.6 - 14.5 sec    INR 1.1     TROPONIN-HIGH SENSITIVITY    Collection Time: 10/29/21  8:29 AM   Result Value Ref Range    Troponin-High Sensitivity 19.2 (H) 0 - 14 pg/mL   MAGNESIUM    Collection Time: 10/29/21  8:29 AM   Result Value Ref Range    Magnesium 2.1 1.8 - 2.4 mg/dL   NT-PRO BNP    Collection Time: 10/29/21  8:29 AM   Result Value Ref Range    NT pro- (H) 5 - 125 PG/ML     Voice dictation software was used during the making of this note. This software is not perfect and grammatical and other typographical errors may be present. This note has been proofread, but may still contain errors.   Nieves Will MD; 10/29/2021 @9:20 AM   ===================================================================

## 2021-10-29 NOTE — ROUTINE PROCESS
TRANSFER - OUT REPORT: 
 
Verbal report given to Reyes Fanning on [address]  being transferred to [facility] for routine progression of care Report consisted of patients Situation, Background, Assessment and  
Recommendations(SBAR). Information from the following report(s) SBAR, ED Summary, Intake/Output, MAR, Recent Results and Cardiac Rhythm NSR was reviewed with the receiving nurse. Lines:  
Peripheral IV 10/29/21 Posterior;Right Wrist (Active) Opportunity for questions and clarification was provided. Patient transported with: 
 O2 @ 4 liters

## 2021-10-29 NOTE — PROGRESS NOTES
Humaira Whitaker  : 1955  Primary: Kevin Israel [employer] *  Secondary:  [facility]  [address], [address], [facility] [address]  [phone]   [phone]          OUTPATIENT DAILY NOTE    NAME/AGE/GENDER: Cherri Akhtar is a 77 y.o. female. DATE: 10/29/2021    Patient cancelled (less than 24 hours ago) her appointment for today due to overall body swelling. Her daughter is going to take her to the ED. .  Will plan to follow up on next scheduled visit as appropriate. Danuta Goodwin, OT    No future appointments.

## 2021-10-29 NOTE — ED TRIAGE NOTES
Pt with shortness of breath and generalized swelling since yesterday. Per pt, she typicall wears 1.7L O2. No O2 on patient upon arrival. Pt with O2 level 70% on RA in triage. Pt leg swelling has been treated over the last year but within this last week, the swelling has moved into the abdomen.

## 2021-10-30 PROBLEM — I50.31 ACUTE DIASTOLIC CHF (CONGESTIVE HEART FAILURE) (HCC): Status: ACTIVE | Noted: 2021-10-30

## 2021-10-30 LAB
ALBUMIN SERPL-MCNC: 2.7 G/DL (ref 3.2–4.6)
ALBUMIN/GLOB SERPL: 0.7 {RATIO} (ref 1.2–3.5)
ALP SERPL-CCNC: 123 U/L (ref 50–136)
ALT SERPL-CCNC: 26 U/L (ref 12–65)
ANION GAP SERPL CALC-SCNC: 3 MMOL/L (ref 7–16)
APPEARANCE UR: ABNORMAL
AST SERPL-CCNC: 34 U/L (ref 15–37)
BACTERIA URNS QL MICRO: ABNORMAL /HPF
BASOPHILS # BLD: 0 K/UL (ref 0–0.2)
BASOPHILS NFR BLD: 0 % (ref 0–2)
BILIRUB SERPL-MCNC: 0.7 MG/DL (ref 0.2–1.1)
BILIRUB UR QL: NEGATIVE
BUN SERPL-MCNC: 26 MG/DL (ref 8–23)
CALCIUM SERPL-MCNC: 9.2 MG/DL (ref 8.3–10.4)
CASTS URNS QL MICRO: 0 /LPF
CHLORIDE SERPL-SCNC: 97 MMOL/L (ref 98–107)
CO2 SERPL-SCNC: 35 MMOL/L (ref 21–32)
COLOR UR: YELLOW
CREAT SERPL-MCNC: 0.9 MG/DL (ref 0.6–1)
DIFFERENTIAL METHOD BLD: ABNORMAL
EOSINOPHIL # BLD: 0 K/UL (ref 0–0.8)
EOSINOPHIL NFR BLD: 0 % (ref 0.5–7.8)
EPI CELLS #/AREA URNS HPF: 0 /HPF
ERYTHROCYTE [DISTWIDTH] IN BLOOD BY AUTOMATED COUNT: 15.7 % (ref 11.9–14.6)
GLOBULIN SER CALC-MCNC: 4 G/DL (ref 2.3–3.5)
GLUCOSE SERPL-MCNC: 138 MG/DL (ref 65–100)
GLUCOSE UR STRIP.AUTO-MCNC: NEGATIVE MG/DL
HCT VFR BLD AUTO: 27.3 % (ref 35.8–46.3)
HGB BLD-MCNC: 8.4 G/DL (ref 11.7–15.4)
HGB UR QL STRIP: NEGATIVE
IMM GRANULOCYTES # BLD AUTO: 0 K/UL (ref 0–0.5)
IMM GRANULOCYTES NFR BLD AUTO: 1 % (ref 0–5)
KETONES UR QL STRIP.AUTO: NEGATIVE MG/DL
LEUKOCYTE ESTERASE UR QL STRIP.AUTO: ABNORMAL
LYMPHOCYTES # BLD: 0.2 K/UL (ref 0.5–4.6)
LYMPHOCYTES NFR BLD: 6 % (ref 13–44)
MCH RBC QN AUTO: 28.7 PG (ref 26.1–32.9)
MCHC RBC AUTO-ENTMCNC: 30.8 G/DL (ref 31.4–35)
MCV RBC AUTO: 93.2 FL (ref 79.6–97.8)
MONOCYTES # BLD: 0.1 K/UL (ref 0.1–1.3)
MONOCYTES NFR BLD: 3 % (ref 4–12)
NEUTS SEG # BLD: 3.5 K/UL (ref 1.7–8.2)
NEUTS SEG NFR BLD: 91 % (ref 43–78)
NITRITE UR QL STRIP.AUTO: POSITIVE
NRBC # BLD: 0 K/UL (ref 0–0.2)
PH UR STRIP: 6 [PH] (ref 5–9)
PLATELET # BLD AUTO: 129 K/UL (ref 150–450)
PMV BLD AUTO: 10 FL (ref 9.4–12.3)
POTASSIUM SERPL-SCNC: 3.7 MMOL/L (ref 3.5–5.1)
PROT SERPL-MCNC: 6.7 G/DL (ref 6.3–8.2)
PROT UR STRIP-MCNC: NEGATIVE MG/DL
RBC # BLD AUTO: 2.93 M/UL (ref 4.05–5.2)
RBC #/AREA URNS HPF: ABNORMAL /HPF
SODIUM SERPL-SCNC: 135 MMOL/L (ref 136–145)
SP GR UR REFRACTOMETRY: 1.01 (ref 1–1.02)
UROBILINOGEN UR QL STRIP.AUTO: 0.2 EU/DL (ref 0.2–1)
WBC # BLD AUTO: 3.9 K/UL (ref 4.3–11.1)
WBC URNS QL MICRO: ABNORMAL /HPF

## 2021-10-30 PROCEDURE — 74011250637 HC RX REV CODE- 250/637: Performed by: PHYSICIAN ASSISTANT

## 2021-10-30 PROCEDURE — 94760 N-INVAS EAR/PLS OXIMETRY 1: CPT

## 2021-10-30 PROCEDURE — 74011250636 HC RX REV CODE- 250/636: Performed by: PHYSICIAN ASSISTANT

## 2021-10-30 PROCEDURE — 80053 COMPREHEN METABOLIC PANEL: CPT

## 2021-10-30 PROCEDURE — 97530 THERAPEUTIC ACTIVITIES: CPT

## 2021-10-30 PROCEDURE — 97161 PT EVAL LOW COMPLEX 20 MIN: CPT

## 2021-10-30 PROCEDURE — 36415 COLL VENOUS BLD VENIPUNCTURE: CPT

## 2021-10-30 PROCEDURE — 97165 OT EVAL LOW COMPLEX 30 MIN: CPT

## 2021-10-30 PROCEDURE — 81001 URINALYSIS AUTO W/SCOPE: CPT

## 2021-10-30 PROCEDURE — 85025 COMPLETE CBC W/AUTO DIFF WBC: CPT

## 2021-10-30 PROCEDURE — 65270000029 HC RM PRIVATE

## 2021-10-30 PROCEDURE — 74011000250 HC RX REV CODE- 250: Performed by: PHYSICIAN ASSISTANT

## 2021-10-30 PROCEDURE — 77010033678 HC OXYGEN DAILY

## 2021-10-30 PROCEDURE — 94640 AIRWAY INHALATION TREATMENT: CPT

## 2021-10-30 PROCEDURE — 74011250637 HC RX REV CODE- 250/637: Performed by: FAMILY MEDICINE

## 2021-10-30 RX ORDER — NYSTATIN 100000 [USP'U]/G
POWDER TOPICAL 2 TIMES DAILY
Status: DISCONTINUED | OUTPATIENT
Start: 2021-10-30 | End: 2021-11-07 | Stop reason: HOSPADM

## 2021-10-30 RX ADMIN — NYSTATIN: 100000 POWDER TOPICAL at 12:28

## 2021-10-30 RX ADMIN — DULOXETINE HYDROCHLORIDE 60 MG: 60 CAPSULE, DELAYED RELEASE ORAL at 09:32

## 2021-10-30 RX ADMIN — LOSARTAN POTASSIUM 50 MG: 50 TABLET, FILM COATED ORAL at 09:32

## 2021-10-30 RX ADMIN — METHYLPREDNISOLONE SODIUM SUCCINATE 60 MG: 40 INJECTION, POWDER, FOR SOLUTION INTRAMUSCULAR; INTRAVENOUS at 02:31

## 2021-10-30 RX ADMIN — LATANOPROST 1 DROP: 50 SOLUTION OPHTHALMIC at 21:03

## 2021-10-30 RX ADMIN — NYSTATIN: 100000 POWDER TOPICAL at 17:02

## 2021-10-30 RX ADMIN — Medication 10 ML: at 14:25

## 2021-10-30 RX ADMIN — METHYLPREDNISOLONE SODIUM SUCCINATE 60 MG: 40 INJECTION, POWDER, FOR SOLUTION INTRAMUSCULAR; INTRAVENOUS at 17:01

## 2021-10-30 RX ADMIN — IPRATROPIUM BROMIDE AND ALBUTEROL SULFATE 3 ML: .5; 3 SOLUTION RESPIRATORY (INHALATION) at 17:21

## 2021-10-30 RX ADMIN — HYDROCODONE BITARTRATE AND ACETAMINOPHEN 1 TABLET: 10; 325 TABLET ORAL at 17:13

## 2021-10-30 RX ADMIN — ENOXAPARIN SODIUM 40 MG: 100 INJECTION SUBCUTANEOUS at 09:31

## 2021-10-30 RX ADMIN — BUDESONIDE 500 MCG: 0.5 INHALANT RESPIRATORY (INHALATION) at 08:45

## 2021-10-30 RX ADMIN — PANTOPRAZOLE SODIUM 40 MG: 40 TABLET, DELAYED RELEASE ORAL at 09:32

## 2021-10-30 RX ADMIN — METHYLPREDNISOLONE SODIUM SUCCINATE 60 MG: 40 INJECTION, POWDER, FOR SOLUTION INTRAMUSCULAR; INTRAVENOUS at 09:32

## 2021-10-30 RX ADMIN — FUROSEMIDE 40 MG: 10 INJECTION, SOLUTION INTRAMUSCULAR; INTRAVENOUS at 17:02

## 2021-10-30 RX ADMIN — IPRATROPIUM BROMIDE AND ALBUTEROL SULFATE 3 ML: .5; 3 SOLUTION RESPIRATORY (INHALATION) at 08:45

## 2021-10-30 RX ADMIN — Medication 10 ML: at 21:03

## 2021-10-30 RX ADMIN — Medication 81 MG: at 09:32

## 2021-10-30 RX ADMIN — IPRATROPIUM BROMIDE AND ALBUTEROL SULFATE 3 ML: .5; 3 SOLUTION RESPIRATORY (INHALATION) at 20:00

## 2021-10-30 RX ADMIN — IPRATROPIUM BROMIDE AND ALBUTEROL SULFATE 3 ML: .5; 3 SOLUTION RESPIRATORY (INHALATION) at 00:44

## 2021-10-30 RX ADMIN — HYDROCODONE BITARTRATE AND ACETAMINOPHEN 1 TABLET: 10; 325 TABLET ORAL at 12:28

## 2021-10-30 RX ADMIN — TIOTROPIUM BROMIDE INHALATION SPRAY 2 PUFF: 3.12 SPRAY, METERED RESPIRATORY (INHALATION) at 08:45

## 2021-10-30 RX ADMIN — ENOXAPARIN SODIUM 40 MG: 100 INJECTION SUBCUTANEOUS at 21:02

## 2021-10-30 RX ADMIN — HYDROCODONE BITARTRATE AND ACETAMINOPHEN 1 TABLET: 10; 325 TABLET ORAL at 06:38

## 2021-10-30 RX ADMIN — FUROSEMIDE 40 MG: 10 INJECTION, SOLUTION INTRAMUSCULAR; INTRAVENOUS at 09:32

## 2021-10-30 RX ADMIN — IPRATROPIUM BROMIDE AND ALBUTEROL SULFATE 3 ML: .5; 3 SOLUTION RESPIRATORY (INHALATION) at 04:00

## 2021-10-30 RX ADMIN — IPRATROPIUM BROMIDE AND ALBUTEROL SULFATE 3 ML: .5; 3 SOLUTION RESPIRATORY (INHALATION) at 12:34

## 2021-10-30 RX ADMIN — BUPROPION HYDROCHLORIDE 150 MG: 150 TABLET, EXTENDED RELEASE ORAL at 17:02

## 2021-10-30 RX ADMIN — BUPROPION HYDROCHLORIDE 150 MG: 150 TABLET, EXTENDED RELEASE ORAL at 09:32

## 2021-10-30 RX ADMIN — BUDESONIDE 500 MCG: 0.5 INHALANT RESPIRATORY (INHALATION) at 20:00

## 2021-10-30 RX ADMIN — ATORVASTATIN CALCIUM: 10 TABLET, FILM COATED ORAL at 09:29

## 2021-10-30 RX ADMIN — Medication 10 ML: at 06:16

## 2021-10-30 RX ADMIN — ARFORMOTEROL TARTRATE 15 MCG: 15 SOLUTION RESPIRATORY (INHALATION) at 08:45

## 2021-10-30 NOTE — PROGRESS NOTES
END OF SHIFT NOTE:    Intake/Output  No intake/output data recorded. Voiding: YES  Catheter: NO  Drain:              Stool:  1 occurrences. Stool Assessment  Stool Color: Angel Dew (10/29/21 2020)  Stool Appearance: Soft (10/30/21 0415)  Stool Amount: Medium (10/29/21 2020)  Stool Source/Status: Rectum (10/29/21 2020)    Emesis:  0 occurrences. VITAL SIGNS  Patient Vitals for the past 12 hrs:   Temp Pulse Resp BP SpO2   10/30/21 0716 98.1 °F (36.7 °C) 83 18 (!) 122/56 100 %   10/30/21 0400  87      10/30/21 0231 98.3 °F (36.8 °C) 86 18 124/63 97 %   10/30/21 0045     95 %   10/30/21 0000  92      10/29/21 2329 97.9 °F (36.6 °C) 90 19 132/69 96 %   10/29/21 2150  86      10/29/21 2107     95 %   10/29/21 2020 98.3 °F (36.8 °C) 87 21 (!) 147/65 96 %       Pain Assessment  Pain 1  Pain Scale 1: Numeric (0 - 10) (10/30/21 4595)  Pain Intensity 1: 7 (10/30/21 8633)  Patient Stated Pain Goal: 0 (10/30/21 3360)  Pain Onset 1: chronic (10/30/21 0176)  Pain Location 1: Generalized (10/30/21 6493)  Pain Orientation 1: Other (comment) (all over) (10/30/21 9582)  Pain Description 1: Aching (10/30/21 7781)  Pain Intervention(s) 1: Medication (see MAR) (10/30/21 2860)    Ambulating  Yes    Additional Information:     Will not stay in bed after instructed multiple times to not get out of bed by self. Reinforced fluid restriction rules. Maintained on 4L nc. Shift report given to oncoming nurse at the bedside.     Edyta Yun RN

## 2021-10-30 NOTE — PROGRESS NOTES
Problem: Self Care Deficits Care Plan (Adult)  Goal: *Acute Goals and Plan of Care (Insert Text)  Outcome: Progressing Towards Goal  Note:   Patient will complete lower body dressing with contact guard assist to increase self care independence. Patient will complete toileting with supervision to increase self care independence. Patient will tolerate 25 minutes of OT treatment with self incorporated rest breaks to increase activity tolerance to enhance participation in hobbies. Patient will complete all functional transfers with stand by assist using adaptive equipment as needed. Patient will complete UE exercises with independence to increase overall activity tolerance and strength. Timeframe: 7 visits       OCCUPATIONAL THERAPY: Initial Assessment, Daily Note, and PM 10/30/2021  INPATIENT: OT Visit Days: 1  Payor: Viridiana Ellison / Plan: [employer] MEDIC[employer] CHOICE PPO/PFFS / Product Type: Managed Care Medicare /      NAME/AGE/GENDER: Liza Salgado is a 77 y.o. female   PRIMARY DIAGNOSIS:  Acute exacerbation of CHF (congestive heart failure) ([facility]) [E31.9] Acute diastolic CHF (congestive heart failure) ([facility]) Acute diastolic CHF (congestive heart failure) ([facility])        ICD-10: Treatment Diagnosis:    Generalized Muscle Weakness (M62.81)  Difficulty in walking, Not elsewhere classified (R26.2)  History of falling (Z91.81)   Precautions/Allergies:   Patient has no known allergies. ASSESSMENT:     Ms. Lance Montgomery presents with the above diagnosis and deficits in strength, ADL function, and mobility. She is limited today by edema and shortness of breath. At baseline, she is independent with ADLs and lives with her daughter, Maryanne Brunner. This past week she has experienced an increased need for assistance from family to perform daily tasks.    She was able to perform bed mobility with min A, mobility in room and hallway with CGA, RW, and chair follow and was left sitting up in recliner with all needs met and in reach. She remains on 4L NC and sats in 90s. Pt is functioning below baseline and will benefit from skilled OT during hospital stay. This section established at most recent assessment   PROBLEM LIST (Impairments causing functional limitations):  Decreased Strength  Decreased ADL/Functional Activities  Decreased Transfer Abilities  Decreased Ambulation Ability/Technique  Decreased Balance  Increased Pain  Decreased Activity Tolerance  Increased Shortness of Breath   INTERVENTIONS PLANNED: (Benefits and precautions of occupational therapy have been discussed with the patient.)  Activities of daily living training  Adaptive equipment training  Balance training  Clothing management  Cognitive training  Neuromuscular re-eduation  Therapeutic activity  Therapeutic exercise     TREATMENT PLAN: Frequency/Duration: Follow patient 3x/week to address above goals. Rehabilitation Potential For Stated Goals: Good     REHAB RECOMMENDATIONS (at time of discharge pending progress):    Placement: It is my opinion, based on this patient's performance to date, that Ms. Beryl Main may benefit from [address] after discharge due to the functional deficits listed above that are likely to improve with skilled rehabilitation because he/she has multiple medical issues that affect his/her functional mobility in the community.   Equipment:   None at this time              OCCUPATIONAL PROFILE AND HISTORY:   History of Present Injury/Illness (Reason for Referral):  See H&P  Past Medical History/Comorbidities:   Ms. Beryl Main  has a past medical history of Abnormal glucose level, Anxiety, Back pain, Chronic pain disorder, COPD (chronic obstructive pulmonary disease) ([address] Depression, Elevated blood sugar, Elevated IOP, Essential hypertension, benign, Fatigue, Fibromyalgia, Fracture of distal fibula, GERD (gastroesophageal reflux disease), Ground glass opacity present on imaging of lung, Hyperlipidemia, Hypokalemia, Menopausal problem, Metabolic syndrome, Nausea, Obesity, Osteopenia, and URI (upper respiratory infection). Ms. Foreign Zabala  has a past surgical history that includes hx cholecystectomy; hx hysterectomy; hx tonsillectomy; and colonoscopy (N/A, 10/13/2016). Social History/Living Environment:   Home Environment: Patient room  # Steps to Enter: 6  Rails to Enter: Yes  One/Two Story Residence: One story  Living Alone: No  Support Systems: Child(luciana) (daughter)  Patient Expects to be Discharged to[de-identified] House  Current DME Used/Available at Home: Brigid Clos, rollator, Tub transfer bench  Tub or Shower Type: Tub/Shower combination  Prior Level of Function/Work/Activity:  Independent, lives with family     Number of Personal Factors/Comorbidities that affect the Plan of Care: Brief history (0):  LOW COMPLEXITY   ASSESSMENT OF OCCUPATIONAL PERFORMANCE[de-identified]   Activities of Daily Living:   Basic ADLs (From Assessment) Complex ADLs (From Assessment)   Feeding: Independent  Oral Facial Hygiene/Grooming: Stand-by assistance  Bathing: Moderate assistance  Upper Body Dressing: Setup  Lower Body Dressing: Moderate assistance  Toileting: Contact guard assistance     Grooming/Bathing/Dressing Activities of Daily Living     Cognitive Retraining  Safety/Judgement: Awareness of environment                 Functional Transfers  Bathroom Mobility: Contact guard assistance  Toilet Transfer : Contact guard assistance  Shower Transfer: Contact guard assistance     Bed/Mat Mobility  Supine to Sit: Minimum assistance  Sit to Stand: Contact guard assistance  Stand to Sit: Contact guard assistance  Bed to Chair: Contact guard assistance     Most Recent Physical Functioning:   Gross Assessment:                  Posture:     Balance:  Sitting: Intact  Standing: Pull to stand; With support Bed Mobility:  Supine to Sit: Minimum assistance  Wheelchair Mobility:     Transfers:  Sit to Stand: Contact guard assistance  Stand to Sit: Contact guard assistance  Bed to Chair: Contact guard assistance            Patient Vitals for the past 6 hrs:   BP SpO2 O2 Flow Rate (L/min) Pulse   10/30/21 1139 (!) 127/51 97 % -- (!) 101   10/30/21 1235 -- 96 % 4 l/min --   10/30/21 1521 (!) 101/48 96 % -- 93       Mental Status  Neurologic State: Alert  Orientation Level: Oriented X4  Cognition: Appropriate decision making  Perception: Appears intact  Perseveration: No perseveration noted  Safety/Judgement: Awareness of environment                          Physical Skills Involved:  Range of Motion  Balance  Strength  Activity Tolerance Cognitive Skills Affected (resulting in the inability to perform in a timely and safe manner):  [facility] Psychosocial Skills Affected:  Environmental Adaptation   Number of elements that affect the Plan of Care: 3-5:  MODERATE COMPLEXITY   CLINICAL DECISION MAKIN[address] [place] [id number] AM-PAC 6 Clicks   Daily Activity Inpatient Short Form  How much help from another person does the patient currently need. .. Total A Lot A Little None   1. Putting on and taking off regular lower body clothing? [] 1   [x] 2   [] 3   [] 4   2. Bathing (including washing, rinsing, drying)? [] 1   [x] 2   [] 3   [] 4   3. Toileting, which includes using toilet, bedpan or urinal?   [] 1   [] 2   [x] 3   [] 4   4. Putting on and taking off regular upper body clothing? [] 1   [] 2   [x] 3   [] 4   5. Taking care of personal grooming such as brushing teeth? [] 1   [] 2   [] 3   [x] 4   6. Eating meals? [] 1   [] 2   [] 3   [x] 4   © , Trustees of [address] [place] [address], under license to [employer]. All rights reserved      Score:  Initial: 16 Most Recent: X (Date: -- )    Interpretation of Tool:  Represents activities that are increasingly more difficult (i.e. Bed mobility, Transfers, Gait). Medical Necessity:     Skilled intervention continues to be required due to the above deficits.   Reason for Services/Other Comments:  Patient continues to require skilled intervention due to   CHF exacerbation  . Use of outcome tool(s) and clinical judgement create a POC that gives a: LOW COMPLEXITY         TREATMENT:   (In addition to Assessment/Re-Assessment sessions the following treatments were rendered)     Pre-treatment Symptoms/Complaints:    Pain: Initial:   Pain Intensity 1: 5  Post Session:  5     Therapeutic Activity: (    15): Therapeutic activities including Bed transfers, Chair transfers, and Ambulation on level ground to improve mobility, strength, and balance. Required minimal   to promote static and dynamic balance in standing. Evaluation complete    Braces/Orthotics/Lines/Etc:   O2 Device: Nasal cannula  Treatment/Session Assessment:    Response to Treatment:  Good, up in chair  Interdisciplinary Collaboration:   Physical Therapist  Occupational Therapist  Registered Nurse  After treatment position/precautions:   Up in chair  Bed/Chair-wheels locked  Caregiver at bedside  Call light within reach  RN notified   Compliance with Program/Exercises: Will assess as treatment progresses. Recommendations/Intent for next treatment session: \"Next visit will focus on advancements to more challenging activities and reduction in assistance provided\".   Total Treatment Duration:  OT Patient Time In/Time Out  Time In: 3542  Time Out: [place], [place]

## 2021-10-30 NOTE — PROGRESS NOTES
Hourly rounds completed throughout this shift. Pain medicine given per MAR. Pt resting and sitting up in chair, call light and personal items within pt's reach; pt denies needs at this time. Will continue to monitor and report to oncoming night shift nurse.

## 2021-10-30 NOTE — PROGRESS NOTES
Hospitalist Progress Note   Admit Date:  10/29/2021  8:02 AM   Name:  Valentino Sings   Age:  77 y.o. Sex:  female  :  1955   MRN:  [id number]   Room:  [facility]/    Presenting Complaint: Shortness of Breath and Leg Swelling    Reason(s) for Admission: Acute exacerbation of CHF (congestive heart failure) [facility]) [I50.9]     Hospital Course & Interval History:   Valentino Sings is a 77 y.o. female with medical history of chronic hypoxemic respiratory failure (4L via NC at baseline), COPD with ongoing tobacco abuse, HTN, chronic back pain, chronic edema with probable CHF who presented with increasing dyspnea and edema. She was evaluated in the ER 10/25/2021 for the same presentation and noted to have increased symptoms > 2 weeks. Pt has underlying CHF (no echo on record) and admits to compliance with lasix. BNP was elevated 522 with trop 33.3 though cxray without gross abnl. Pt was given IV lasix and then dc'd from the ER with a 3-day prescription of lasix though daughter reports she already had the same prescription at home.     Over the past couple of days, pt has had increased orthopnea, [place], edema / anasarca and admits to increased abdominal girth. She presented back to the ER today and cxray now revealed increased pulm vascular markings with BNP slightly increased to 549 though troponin levels improved at 19.2. Given her worsening pulmonary symptoms worsened by her underlying COPD which is now in exacerbation, Hospitalist consulted by ER for inpatient admission.      Of significance pt denies hx of CHF and in her family hx, only cardiac hx is hypertension in her mother and MI in her paternal grandfather.     Subjective (10/30/21):  Daughter at the bedside  Patient sitting in chair on 4 L/min  Says breathing and swelling of the extremities better  Chronic bilateral leg pain    Assessment & Plan:     Principal Problem:    Acute diastolic CHF (congestive heart failure) ([address] (10/30/2021)  Improving swelling  Continue Lasix 40 mg IV twice daily  Echo shows grade 1 diastolic dysfunction    Hypertension  Stable on losartan      COPD (chronic obstructive pulmonary disease) ([facility]) ()  We will decrease Solu-Medrol to 40 every 8hr  Continue nebs      Chronic hypoxemic respiratory failure ([facility] (10/29/2021)  On 4 L/min at home      Anasarca (10/29/2021)  secondary to CHF, improving    Morbid obesity  BMI 46.85    Anxiety/depression  Continue Wellbutrin, Cymbalta    Hyperlipidemia  Continue home medication           Diet:  ADULT DIET Regular;  Low Sodium (2 gm); 1000 ml  DVT PPx: Lovenox  Code status: DNR    Hospital Problems as of 10/30/2021 Date Reviewed: 7/15/2021        Codes Class Noted - Resolved POA    * (Principal) Acute diastolic CHF (congestive heart failure) ([facility]) ICD-10-CM: I50.31  ICD-9-CM: 428.31, 428.0  10/30/2021 - Present Unknown        Acute exacerbation of CHF (congestive heart failure) ([facility]) ICD-10-CM: I50.9  ICD-9-CM: 428.0  10/29/2021 - Present Unknown        Chronic hypoxemic respiratory failure ([facility]) ICD-10-CM: J96.11  ICD-9-CM: 518.83, 799.02  10/29/2021 - Present Unknown        Anasarca ICD-10-CM: R60.1  ICD-9-CM: 782.3  10/29/2021 - Present Unknown        Morbid obesity ([facility] ICD-10-CM: E66.01  ICD-9-CM: 278.01  Unknown - Present Unknown        COPD (chronic obstructive pulmonary disease) ([facility] ICD-10-CM: J44.9  ICD-9-CM: 717  Unknown - Present Yes              Objective:     Patient Vitals for the past 24 hrs:   Temp Pulse Resp BP SpO2   10/30/21 1139 97.8 °F (36.6 °C) (!) 101 18 (!) 127/51 97 %   10/30/21 0845     98 %   10/30/21 0716 98.1 °F (36.7 °C) 83 18 (!) 122/56 100 %   10/30/21 0400  87      10/30/21 0231 98.3 °F (36.8 °C) 86 18 124/63 97 %   10/30/21 0045     95 %   10/30/21 0000  92      10/29/21 2329 97.9 °F (36.6 °C) 90 19 132/69 96 %   10/29/21 2150  86      10/29/21 2107     95 %   10/29/21 2020 98.3 °F (36.8 °C) 87 21 (!) 147/65 96 %   10/29/21 1702 98.7 °F (37.1 °C) 87 16 (!) 141/61 97 %   10/29/21 1618 98.3 °F (36.8 °C) 86  137/66 98 %   10/29/21 1553     97 %   10/29/21 1530  83  (!) 124/58 95 %   10/29/21 1500  90  (!) 121/57 91 %   10/29/21 1400  86 10 (!) 113/55    10/29/21 1330  86  125/62 97 %     Oxygen Therapy  O2 Sat (%): 97 % (10/30/21 1139)  Pulse via Oximetry: 89 beats per minute (10/30/21 0845)  O2 Device: Nasal cannula (10/30/21 0845)  Skin Assessment: Other (see comment/note) (10/29/21 0834)  O2 Flow Rate (L/min): 4 l/min (10/30/21 0845)    Estimated body mass index is 46.85 kg/m² as calculated from the following:    Height as of this encounter: 5' 3\" (1.6 m). Weight as of this encounter: 120 kg (264 lb 8 oz). Intake/Output Summary (Last 24 hours) at 10/30/2021 1230  Last data filed at 10/30/2021 0900  Gross per 24 hour   Intake 840 ml   Output 1300 ml   Net -460 ml         Physical Exam:     Blood pressure (!) 127/51, pulse (!) 101, temperature 97.8 °F (36.6 °C), resp. rate 18, height 5' 3\" (1.6 m), weight 120 kg (264 lb 8 oz), SpO2 97 %. General:    Well nourished. Improving respiratory status, presently on 4 L/min  Head:  Normocephalic, atraumatic  Eyes:  Sclerae appear normal.  Pupils equally round. ENT:  Nares appear normal, no drainage. Moist oral mucosa  Neck:  No restricted ROM. Trachea midline   CV:   RRR. No m/r/g. No jugular venous distension. Lungs:   Improved bilateral wheezing and crepitation  Abdomen: Bowel sounds present. Soft, nontender, nondistended. Morbidly obese  Extremities: Improving edema upper and lower extremities  Skin:     No rashes and normal coloration. Warm and dry. Neuro:  CN II-XII grossly intact. Sensation intact. A&Ox3  Psych:  Normal mood and affect.       I have reviewed ordered lab tests and independently visualized imaging below:    Recent Labs:  Recent Results (from the past 48 hour(s))   EKG, 12 LEAD, INITIAL    Collection Time: 10/29/21  8:28 AM Result Value Ref Range    Ventricular Rate 91 BPM    Atrial Rate 91 BPM    P-R Interval 148 ms    QRS Duration 126 ms    Q-T Interval 334 ms    QTC Calculation (Bezet) 410 ms    Calculated P Axis 52 degrees    Calculated R Axis 34 degrees    Calculated T Axis 22 degrees    Diagnosis       !! AGE AND GENDER SPECIFIC ECG ANALYSIS !! Normal sinus rhythm  Right bundle branch block  Nonspecific ST abnormality  Abnormal ECG  When compared with ECG of 25-OCT-2021 12:04,  QT has shortened  Confirmed by [facility]  MD ()BLANCA ([id number]) on 10/29/2021 10:46:16 AM     CBC WITH AUTOMATED DIFF    Collection Time: 10/29/21  8:29 AM   Result Value Ref Range    WBC 3.9 (L) 4.3 - 11.1 K/uL    RBC 3.05 (L) 4.05 - 5.2 M/uL    HGB 8.6 (L) 11.7 - 15.4 g/dL    HCT 28.5 (L) 35.8 - 46.3 %    MCV 93.4 79.6 - 97.8 FL    MCH 28.2 26.1 - 32.9 PG    MCHC 30.2 (L) 31.4 - 35.0 g/dL    RDW 15.7 (H) 11.9 - 14.6 %    PLATELET 474 517 - 246 K/uL    MPV 10.1 9.4 - 12.3 FL    ABSOLUTE NRBC 0.00 0.0 - 0.2 K/uL    DF AUTOMATED     METABOLIC PANEL, COMPREHENSIVE    Collection Time: 10/29/21  8:29 AM   Result Value Ref Range    Sodium 132 (L) 136 - 145 mmol/L    Potassium 3.8 3.5 - 5.1 mmol/L    Chloride 97 (L) 98 - 107 mmol/L    CO2 31 21 - 32 mmol/L    Anion gap 4 (L) 7 - 16 mmol/L    Glucose 132 (H) 65 - 100 mg/dL    BUN 28 (H) 8 - 23 MG/DL    Creatinine 0.96 0.6 - 1.0 MG/DL    GFR est AA >60 >60 ml/min/1.73m2    GFR est non-AA >60 >60 ml/min/1.73m2    Calcium 8.9 8.3 - 10.4 MG/DL    Bilirubin, total 0.8 0.2 - 1.1 MG/DL    ALT (SGPT) 24 12 - 65 U/L    AST (SGOT) 33 15 - 37 U/L    Alk.  phosphatase 137 (H) 50 - 130 U/L    Protein, total 6.7 6.3 - 8.2 g/dL    Albumin 2.7 (L) 3.2 - 4.6 g/dL    Globulin 4.0 (H) 2.3 - 3.5 g/dL    A-G Ratio 0.7 (L) 1.2 - 3.5     LACTIC ACID    Collection Time: 10/29/21  8:29 AM   Result Value Ref Range    Lactic acid 1.5 0.4 - 2.0 MMOL/L   PROTHROMBIN TIME + INR    Collection Time: 10/29/21  8:29 AM   Result Value Ref Range    Prothrombin time 14.5 12.6 - 14.5 sec    INR 1.1     PROCALCITONIN    Collection Time: 10/29/21  8:29 AM   Result Value Ref Range    Procalcitonin <0.05 ng/mL   TROPONIN-HIGH SENSITIVITY    Collection Time: 10/29/21  8:29 AM   Result Value Ref Range    Troponin-High Sensitivity 19.2 (H) 0 - 14 pg/mL   MAGNESIUM    Collection Time: 10/29/21  8:29 AM   Result Value Ref Range    Magnesium 2.1 1.8 - 2.4 mg/dL   NT-PRO BNP    Collection Time: 10/29/21  8:29 AM   Result Value Ref Range    NT pro- (H) 5 - 125 PG/ML   ECHO ADULT COMPLETE    Collection Time: 10/29/21  2:11 PM   Result Value Ref Range    LV ED Vol A2C 129.00 cm3    LV ED Vol A4C 138.00 cm3    LV ES Vol A2C 32.00 cm3    LV ES Vol A4C 37.00 cm3    IVSd 1.14 (A) 0.60 - 0.90 cm    LVIDd 4.30 3.90 - 5.30 cm    LVIDs 3.37 cm    LVOT d 2.20 cm    LVOT VTI 34.60 cm    LVOT Peak Gradient 10.00 mmHg    LVPWd 1.08 (A) 0.60 - 0.90 cm    LV E' Lateral Velocity 6.02 cm/s    LV E' Septal Velocity 8.69 cm/s    Left Atrium Minor Axis 2.77 cm    Left Atrium Major Axis 5.90 cm    LA Area 2C 20.10 cm2    LA Area 4C 19.80 cm2    Aortic Valve Systolic Mean Gradient 13.53 mmHg    AoV VTI 41.10 cm    Aortic Valve Systolic Peak Velocity 808.68 cm/s    AoV PG 21.00 mmHg    Aortic Valve Area by Continuity of VTI 3.20 cm2    Aortic Valve Area by Continuity of Peak Velocity 2.62 cm2    Ao Root D 3.20 cm    Mitral Valve E Wave Deceleration Time 219.00 ms    MV A Víctor 105.00 cm/s    MV E Víctor 151.00 cm/s    MV Mean Gradient 5.00 mmHg    Mitral Valve Annulus Velocity Time Integral 46.00 cm    Pulmonic Valve Acceleration Time 140.00 ms    Pulmonic Valve Systolic Peak Instantaneous Gradient 8.00 mmHg    Est. RA Pressure 15.00 mmHg    RVIDd 4.20 cm    TR Max Velocity 217.00 cm/s    Triscuspid Valve Regurgitation Peak Gradient 19.00 mmHg    MV E/A 1.44     LV Mass .1 67.0 - 162.0 g    LV Mass AL Index 77.5 43.0 - 95.0 g/m2    E/E' lateral 25.08     E/E' septal 17.38     RVSP 34.0 mmHg    E/E' ratio (averaged) 21.23     ALLYSSA/BSA Pk Víctor 1.2 cm2/m2    ALLYSSA/BSA VTI 1.5 cm2/m2   TROPONIN-HIGH SENSITIVITY    Collection Time: 10/29/21  3:33 PM   Result Value Ref Range    Troponin-High Sensitivity 17.5 (H) 0 - 14 pg/mL   CK WITH MB    Collection Time: 10/29/21  3:33 PM   Result Value Ref Range    CK - MB 1.3 (L) 1.5 - 3.6 ng/ml    CK-MB Index 1.6 %    CK 80 21 - 215 U/L   BLOOD GAS, ARTERIAL POC    Collection Time: 10/29/21  4:01 PM   Result Value Ref Range    Device: NASAL CANNULA      FIO2 (POC) 36 %    pH (POC) 7.42 7.35 - 7.45      pCO2 (POC) 51.4 (H) 35 - 45 MMHG    pO2 (POC) 79 75 - 100 MMHG    HCO3 (POC) 33.1 (H) 22 - 26 MMOL/L    sO2 (POC) 95.4 95 - 98 %    Base excess (POC) 7.5 mmol/L    Allens test (POC) NOT APPLICABLE      Site RIGHT BRACHIAL      Specimen type (POC) ARTERIAL      Performed by Nga     CO2, POC 33 (H) 13 - 23 MMOL/L   URINALYSIS W/ RFLX MICROSCOPIC    Collection Time: 10/30/21 12:19 AM   Result Value Ref Range    Color YELLOW      Appearance CLOUDY      Specific gravity 1.008 1.001 - 1.023      pH (UA) 6.0 5.0 - 9.0      Protein Negative NEG mg/dL    Glucose Negative mg/dL    Ketone Negative NEG mg/dL    Bilirubin Negative NEG      Blood Negative NEG      Urobilinogen 0.2 0.2 - 1.0 EU/dL    Nitrites Positive (A) NEG      Leukocyte Esterase MODERATE (A) NEG      WBC 20-50 0 /hpf    RBC 0-3 0 /hpf    Epithelial cells 0 0 /hpf    Bacteria 2+ (H) 0 /hpf    Casts 0 0 /lpf   METABOLIC PANEL, COMPREHENSIVE    Collection Time: 10/30/21  6:12 AM   Result Value Ref Range    Sodium 135 (L) 136 - 145 mmol/L    Potassium 3.7 3.5 - 5.1 mmol/L    Chloride 97 (L) 98 - 107 mmol/L    CO2 35 (H) 21 - 32 mmol/L    Anion gap 3 (L) 7 - 16 mmol/L    Glucose 138 (H) 65 - 100 mg/dL    BUN 26 (H) 8 - 23 MG/DL    Creatinine 0.90 0.6 - 1.0 MG/DL    GFR est AA >60 >60 ml/min/1.73m2    GFR est non-AA >60 >60 ml/min/1.73m2    Calcium 9.2 8.3 - 10.4 MG/DL    Bilirubin, total 0.7 0.2 - 1.1 MG/DL    ALT (SGPT) 26 12 - 65 U/L    AST (SGOT) 34 15 - 37 U/L    Alk. phosphatase 123 50 - 136 U/L    Protein, total 6.7 6.3 - 8.2 g/dL    Albumin 2.7 (L) 3.2 - 4.6 g/dL    Globulin 4.0 (H) 2.3 - 3.5 g/dL    A-G Ratio 0.7 (L) 1.2 - 3.5     CBC WITH AUTOMATED DIFF    Collection Time: 10/30/21  6:12 AM   Result Value Ref Range    WBC 3.9 (L) 4.3 - 11.1 K/uL    RBC 2.93 (L) 4.05 - 5.2 M/uL    HGB 8.4 (L) 11.7 - 15.4 g/dL    HCT 27.3 (L) 35.8 - 46.3 %    MCV 93.2 79.6 - 97.8 FL    MCH 28.7 26.1 - 32.9 PG    MCHC 30.8 (L) 31.4 - 35.0 g/dL    RDW 15.7 (H) 11.9 - 14.6 %    PLATELET 741 (L) 786 - 450 K/uL    MPV 10.0 9.4 - 12.3 FL    ABSOLUTE NRBC 0.00 0.0 - 0.2 K/uL    DF AUTOMATED      NEUTROPHILS 91 (H) 43 - 78 %    LYMPHOCYTES 6 (L) 13 - 44 %    MONOCYTES 3 (L) 4.0 - 12.0 %    EOSINOPHILS 0 (L) 0.5 - 7.8 %    BASOPHILS 0 0.0 - 2.0 %    IMMATURE GRANULOCYTES 1 0.0 - 5.0 %    ABS. NEUTROPHILS 3.5 1.7 - 8.2 K/UL    ABS. LYMPHOCYTES 0.2 (L) 0.5 - 4.6 K/UL    ABS. MONOCYTES 0.1 0.1 - 1.3 K/UL    ABS. EOSINOPHILS 0.0 0.0 - 0.8 K/UL    ABS. BASOPHILS 0.0 0.0 - 0.2 K/UL    ABS. IMM. GRANS. 0.0 0.0 - 0.5 K/UL       All Micro Results     None          Other Studies:  ECHO ADULT COMPLETE    Result Date: 10/29/2021  · Image quality for this study was technically difficult. · Contrast used: DEFINITY. · LV: Estimated LVEF is 60 - 65%. Normal cavity size, wall thickness and systolic function (ejection fraction normal). Mild (grade 1) left ventricular diastolic dysfunction. · TV: Mild tricuspid valve regurgitation is present. Right Ventricular Arterial Pressure (RVSP) is 34 mmHg. · MV: Mild mitral valve regurgitation is present. · PV: Mild pulmonic valve regurgitation is present. · LA: Mildly dilated left atrium. · RA: Mildly dilated right atrium.         Current Meds:  Current Facility-Administered Medications   Medication Dose Route Frequency    nystatin (MYCOSTATIN) 100,000 unit/gram powder   Topical BID    aspirin delayed-release tablet 81 mg  81 mg Oral DAILY    buPROPion SR (WELLBUTRIN SR) tablet 150 mg  150 mg Oral BID    DULoxetine (CYMBALTA) capsule 60 mg  60 mg Oral DAILY    ezetimibe/atorvastatin 10/10 mg   Oral DAILY    [Held by provider] gabapentin (NEURONTIN) capsule 600 mg  600 mg Oral QHS    latanoprost (XALATAN) 0.005 % ophthalmic solution 1 Drop  1 Drop Both Eyes QHS    furosemide (LASIX) injection 40 mg  40 mg IntraVENous BID    sodium chloride (NS) flush 5-40 mL  5-40 mL IntraVENous Q8H    sodium chloride (NS) flush 5-40 mL  5-40 mL IntraVENous PRN    acetaminophen (TYLENOL) tablet 650 mg  650 mg Oral Q6H PRN    Or    acetaminophen (TYLENOL) suppository 650 mg  650 mg Rectal Q6H PRN    polyethylene glycol (MIRALAX) packet 17 g  17 g Oral DAILY PRN    ondansetron (ZOFRAN ODT) tablet 4 mg  4 mg Oral Q8H PRN    Or    ondansetron (ZOFRAN) injection 4 mg  4 mg IntraVENous Q6H PRN    enoxaparin (LOVENOX) injection 40 mg  40 mg SubCUTAneous Q12H    nicotine (NICODERM CQ) 14 mg/24 hr patch 1 Patch  1 Patch TransDERmal DAILY    pantoprazole (PROTONIX) tablet 40 mg  40 mg Oral ACB    arformoteroL (BROVANA) neb solution 15 mcg  15 mcg Nebulization BID RT    budesonide (PULMICORT) 500 mcg/2 ml nebulizer suspension  500 mcg Nebulization BID RT    albuterol-ipratropium (DUO-NEB) 2.5 MG-0.5 MG/3 ML  3 mL Nebulization Q4H RT    methylPREDNISolone (PF) (SOLU-MEDROL) injection 60 mg  60 mg IntraVENous Q8H    tiotropium bromide (SPIRIVA RESPIMAT) 2.5 mcg /actuation  2 Puff Inhalation DAILY    losartan (COZAAR) tablet 50 mg  50 mg Oral DAILY    ALPRAZolam (XANAX) tablet 1 mg  1 mg Oral BID PRN    HYDROcodone-acetaminophen (NORCO)  mg tablet 1 Tablet  1 Tablet Oral Q6H PRN       Signed:  Marilyn Allred MD

## 2021-10-30 NOTE — PROGRESS NOTES
Problem: Mobility Impaired (Adult and Pediatric)  Goal: *Acute Goals and Plan of Care (Insert Text)  Outcome: Progressing Towards Goal  Note:     LTG:  (1.)Ms. Kelley Gay will move from supine to sit and sit to supine  in bed with MODIFIED INDEPENDENCE within 7 treatment day(s). (2.)Ms. Kelley Gay will transfer from bed to chair and chair to bed with MODIFIED INDEPENDENCE using the least restrictive device within 7 treatment day(s). (3.)Ms. Whitaker will ambulate with SUPERVISION for 300 feet with the least restrictive device within 7 treatment day(s). ________________________________________________________________________________________________        PHYSICAL THERAPY: Initial Assessment and PM 10/30/2021  INPATIENT: PT Visit Days : 1  Payor: Anand Forbes / Plan: [facility] [employer] MEDICARE CHOICE PPO/PFFS / Product Type: [employer] Medicare /       NAME/AGE/GENDER: Emily Jordan is a 77 y.o. female   PRIMARY DIAGNOSIS: Acute exacerbation of CHF (congestive heart failure) ([facility]) [Z14.2] Acute diastolic CHF (congestive heart failure) ([facility]) Acute diastolic CHF (congestive heart failure) ([facility])        ICD-10: Treatment Diagnosis:    Generalized Muscle Weakness (M62.81)  Difficulty in walking, Not elsewhere classified (R26.2)   Precaution/Allergies:  Patient has no known allergies. ASSESSMENT:     Ms. Kelley Gay presents with limited functional endurance and independence due to her acute CHF and subsequent LE swelling. She stated that it hurts to move because of the swelling. She will benefit from PT to increase her functional independence. She lives with her daughter and plans on returning home following D/C. She may need HHPT if she is still functionally limited. She transferred out of bed this afternoon and ambulated 100 ft in swartz, with portable oxygen on 4 L, pulling recliner behind her. She needed one sitting rest break then returned to the room to sit up in chair. Daugther at bedside.      This section established at most recent assessment   PROBLEM LIST (Impairments causing functional limitations):  Decreased Strength  Decreased Transfer Abilities  Decreased Ambulation Ability/Technique  Decreased Balance  Decreased Activity Tolerance   INTERVENTIONS PLANNED: (Benefits and precautions of physical therapy have been discussed with the patient.)  Bed Mobility  Gait Training  Therapeutic Exercise/Strengthening  Transfer Training     TREATMENT PLAN: Frequency/Duration: daily for duration of hospital stay  Rehabilitation Potential For Stated Goals: Good     REHAB RECOMMENDATIONS (at time of discharge pending progress):    Placement: It is my opinion, based on this patient's performance to date, that Ms. Luis Espana may benefit from [address] after discharge due to the functional deficits listed above that are likely to improve with skilled rehabilitation because he/she has multiple medical issues that affect his/her functional mobility in the community. Equipment:   None at this time              HISTORY:   History of Present Injury/Illness (Reason for Referral):  [place] is a 77 y.o. female with medical history of chronic hypoxemic respiratory failure (4L via NC at baseline), COPD with ongoing tobacco abuse, HTN, chronic back pain, chronic edema with probable CHF who presented with increasing dyspnea and edema. She was evaluated in the ER 10/25/2021 for the same presentation and noted to have increased symptoms > 2 weeks. Pt has underlying CHF (no echo on record) and admits to compliance with lasix. BNP was elevated 522 with trop 33.3 though cxray without gross abnl. Pt was given IV lasix and then dc'd from the ER with a 3-day prescription of lasix though daughter reports she already had the same prescription at home. Over the past couple of days, pt has had increased orthopnea, [place], edema / anasarca and admits to increased abdominal girth.   She presented back to the ER today and cxray now revealed increased pulm vascular markings with BNP slightly increased to 549 though troponin levels improved at 19.2. Given her worsening pulmonary symptoms worsened by her underlying COPD which is now in exacerbation, Hospitalist consulted by ER for inpatient admission. Of significance pt denies hx of CHF and in her family hx, only cardiac hx is hypertension in her mother and MI in her paternal grandfather. Past Medical History/Comorbidities:   Ms. Sugar Heller  has a past medical history of Abnormal glucose level, Anxiety, Back pain, Chronic pain disorder, COPD (chronic obstructive pulmonary disease) ([address] Depression, Elevated blood sugar, Elevated IOP, Essential hypertension, benign, Fatigue, Fibromyalgia, Fracture of distal fibula, GERD (gastroesophageal reflux disease), Ground glass opacity present on imaging of lung, Hyperlipidemia, Hypokalemia, Menopausal problem, Metabolic syndrome, Nausea, Obesity, Osteopenia, and URI (upper respiratory infection). Ms. Sugar Heller  has a past surgical history that includes hx cholecystectomy; hx hysterectomy; hx tonsillectomy; and colonoscopy (N/A, 10/13/2016). Social History/Living Environment:   Home Environment: Patient room  # Steps to Enter: 6  Rails to Enter: Yes  One/Two Story Residence: One story  Living Alone: No  Support Systems: Child(luciana) (daughter)  Patient Expects to be Discharged to[de-identified] House  Current DME Used/Available at Home: Ronnald Real, rollator, Tub transfer bench  Tub or Shower Type: Tub/Shower combination  Prior Level of Function/Work/Activity:  Independent with rollator at home. Lives with her daughter     Number of Personal Factors/Comorbidities that affect the Plan of Care: 0: LOW COMPLEXITY   EXAMINATION:   Most Recent Physical Functioning:   Gross Assessment:  AROM: Generally decreased, functional  Strength: Generally decreased, functional               Posture:     Balance:  Sitting: Intact  Standing: Pull to stand; With support Bed Mobility:  Supine to Sit: Minimum assistance  Scooting: Stand-by assistance  Wheelchair Mobility:     Transfers:  Sit to Stand: Contact guard assistance  Stand to Sit: Stand-by assistance  Bed to Chair: Contact guard assistance  Gait:     Base of Support: Widened  Speed/Zari: Pace decreased (<100 feet/min)  Gait Abnormalities: Decreased step clearance;Trunk sway increased  Distance (ft): 100 Feet (ft)  Assistive Device: Walker, rolling  Ambulation - Level of Assistance: Stand-by assistance;Contact guard assistance  Interventions: Verbal cues; Safety awareness training      Body Structures Involved:  Muscles Body Functions Affected: Movement Related Activities and Participation Affected: Mobility   Number of elements that affect the Plan of Care: 3: MODERATE COMPLEXITY   CLINICAL PRESENTATION:   Presentation: Stable and uncomplicated: LOW COMPLEXITY   CLINICAL DECISION MAKIN[address] [place] [id number] AM-PAC 6 Clicks   Basic Mobility Inpatient Short Form  How much difficulty does the patient currently have. .. Unable A Lot A Little None   1. Turning over in bed (including adjusting bedclothes, sheets and blankets)? [] 1   [] 2   [x] 3   [] 4   2. Sitting down on and standing up from a chair with arms ( e.g., wheelchair, bedside commode, etc.)   [] 1   [] 2   [x] 3   [] 4   3. Moving from lying on back to sitting on the side of the bed? [] 1   [] 2   [x] 3   [] 4   How much help from another person does the patient currently need. .. Total A Lot A Little None   4. Moving to and from a bed to a chair (including a wheelchair)? [] 1   [] 2   [x] 3   [] 4   5. Need to walk in hospital room? [] 1   [] 2   [x] 3   [] 4   6. Climbing 3-5 steps with a railing? [] 1   [] 2   [x] 3   [] 4   © , Trustees of [address] [place] [id number], under license to [employer].  All rights reserved      Score:  Initial: 18 Most Recent: X (Date: -- )    Interpretation of Tool:  Represents activities that are increasingly more difficult (i.e. Bed mobility, Transfers, Gait). Medical Necessity:     Patient is expected to demonstrate progress in   strength, balance, and functional technique   to   increase independence with gait and transfers  . Reason for Services/Other Comments:  Patient continues to require skilled intervention due to   Limited functional independence  . Use of outcome tool(s) and clinical judgement create a POC that gives a: Clear prediction of patient's progress: LOW COMPLEXITY            TREATMENT:   (In addition to Assessment/Re-Assessment sessions the following treatments were rendered)   Pre-treatment Symptoms/Complaints:  hurting when moving  Pain: Initial:      Post Session:  did not rate     Therapeutic Activity: (    15 minutes): Therapeutic activities including Bed transfers, Chair transfers, and Ambulation on level ground to improve mobility, strength, and balance. Required minimal Verbal cues; Safety awareness training to promote dynamic balance in standing. Assessment today    Braces/Orthotics/Lines/Etc:   Portable O2 for gait  O2 Device: Nasal cannula  Treatment/Session Assessment:    Response to Treatment:  gets short of breath during gait but able to recover well. Her sats stayed in the 90's. Interdisciplinary Collaboration:   Physical Therapist  Occupational Therapist  Registered Nurse  After treatment position/precautions:   Up in chair  Bed/Chair-wheels locked  Bed in low position  Call light within reach  RN notified  Family at bedside   Compliance with Program/Exercises: Compliant all of the time  Recommendations/Intent for next treatment session: \"Next visit will focus on advancements to more challenging activities and reduction in assistance provided\".   Total Treatment Duration:  PT Patient Time In/Time Out  Time In: 1435  Time Out: [address] [facility],

## 2021-10-30 NOTE — PROGRESS NOTES
Consult to SW for \"CHF:PLease assess for post DC needs: HH v REHAB. \" Patient seen yesterday by RN CM for baseline assessment. Patient attends outpatient PT at [facility]. YASMEEN/CARLITO will continue to follow for discharge needs. PT/OT evaluations have been ordered upon admission to better inform dispo.      Gilma Lopez, JULIETA    [facility]    * [email or web address]

## 2021-10-31 ENCOUNTER — APPOINTMENT (OUTPATIENT)
Dept: ULTRASOUND IMAGING | Age: 66
DRG: 291 | End: 2021-10-31
Attending: FAMILY MEDICINE
Payer: MEDICARE

## 2021-10-31 LAB
ANION GAP SERPL CALC-SCNC: 3 MMOL/L (ref 7–16)
BUN SERPL-MCNC: 31 MG/DL (ref 8–23)
CALCIUM SERPL-MCNC: 9.2 MG/DL (ref 8.3–10.4)
CHLORIDE SERPL-SCNC: 96 MMOL/L (ref 98–107)
CO2 SERPL-SCNC: 34 MMOL/L (ref 21–32)
CREAT SERPL-MCNC: 1.15 MG/DL (ref 0.6–1)
GLUCOSE SERPL-MCNC: 138 MG/DL (ref 65–100)
POTASSIUM SERPL-SCNC: 4.5 MMOL/L (ref 3.5–5.1)
SODIUM SERPL-SCNC: 133 MMOL/L (ref 136–145)

## 2021-10-31 PROCEDURE — 76700 US EXAM ABDOM COMPLETE: CPT

## 2021-10-31 PROCEDURE — 74011250637 HC RX REV CODE- 250/637: Performed by: PHYSICIAN ASSISTANT

## 2021-10-31 PROCEDURE — 99223 1ST HOSP IP/OBS HIGH 75: CPT | Performed by: INTERNAL MEDICINE

## 2021-10-31 PROCEDURE — 80048 BASIC METABOLIC PNL TOTAL CA: CPT

## 2021-10-31 PROCEDURE — 74011250636 HC RX REV CODE- 250/636: Performed by: PHYSICIAN ASSISTANT

## 2021-10-31 PROCEDURE — 74011000250 HC RX REV CODE- 250: Performed by: PHYSICIAN ASSISTANT

## 2021-10-31 PROCEDURE — 74011250637 HC RX REV CODE- 250/637: Performed by: FAMILY MEDICINE

## 2021-10-31 PROCEDURE — 65270000029 HC RM PRIVATE

## 2021-10-31 PROCEDURE — 36415 COLL VENOUS BLD VENIPUNCTURE: CPT

## 2021-10-31 PROCEDURE — 94640 AIRWAY INHALATION TREATMENT: CPT

## 2021-10-31 PROCEDURE — 94760 N-INVAS EAR/PLS OXIMETRY 1: CPT

## 2021-10-31 PROCEDURE — 77010033678 HC OXYGEN DAILY

## 2021-10-31 RX ADMIN — ARFORMOTEROL TARTRATE 15 MCG: 15 SOLUTION RESPIRATORY (INHALATION) at 08:25

## 2021-10-31 RX ADMIN — IPRATROPIUM BROMIDE AND ALBUTEROL SULFATE 3 ML: .5; 3 SOLUTION RESPIRATORY (INHALATION) at 20:00

## 2021-10-31 RX ADMIN — LATANOPROST 1 DROP: 50 SOLUTION OPHTHALMIC at 21:13

## 2021-10-31 RX ADMIN — ALPRAZOLAM 1 MG: 0.5 TABLET ORAL at 04:59

## 2021-10-31 RX ADMIN — DULOXETINE HYDROCHLORIDE 60 MG: 60 CAPSULE, DELAYED RELEASE ORAL at 08:56

## 2021-10-31 RX ADMIN — IPRATROPIUM BROMIDE AND ALBUTEROL SULFATE 3 ML: .5; 3 SOLUTION RESPIRATORY (INHALATION) at 15:58

## 2021-10-31 RX ADMIN — Medication 5 ML: at 22:00

## 2021-10-31 RX ADMIN — PANTOPRAZOLE SODIUM 40 MG: 40 TABLET, DELAYED RELEASE ORAL at 08:56

## 2021-10-31 RX ADMIN — METHYLPREDNISOLONE SODIUM SUCCINATE 60 MG: 40 INJECTION, POWDER, FOR SOLUTION INTRAMUSCULAR; INTRAVENOUS at 09:01

## 2021-10-31 RX ADMIN — Medication 10 ML: at 05:06

## 2021-10-31 RX ADMIN — HYDROCODONE BITARTRATE AND ACETAMINOPHEN 1 TABLET: 10; 325 TABLET ORAL at 15:37

## 2021-10-31 RX ADMIN — HYDROCODONE BITARTRATE AND ACETAMINOPHEN 1 TABLET: 10; 325 TABLET ORAL at 01:19

## 2021-10-31 RX ADMIN — HYDROCODONE BITARTRATE AND ACETAMINOPHEN 1 TABLET: 10; 325 TABLET ORAL at 08:56

## 2021-10-31 RX ADMIN — ENOXAPARIN SODIUM 40 MG: 100 INJECTION SUBCUTANEOUS at 21:12

## 2021-10-31 RX ADMIN — IPRATROPIUM BROMIDE AND ALBUTEROL SULFATE 3 ML: .5; 3 SOLUTION RESPIRATORY (INHALATION) at 08:25

## 2021-10-31 RX ADMIN — ONDANSETRON 4 MG: 2 INJECTION INTRAMUSCULAR; INTRAVENOUS at 19:44

## 2021-10-31 RX ADMIN — BUDESONIDE 500 MCG: 0.5 INHALANT RESPIRATORY (INHALATION) at 08:25

## 2021-10-31 RX ADMIN — NYSTATIN: 100000 POWDER TOPICAL at 09:01

## 2021-10-31 RX ADMIN — BUDESONIDE 500 MCG: 0.5 INHALANT RESPIRATORY (INHALATION) at 20:00

## 2021-10-31 RX ADMIN — METHYLPREDNISOLONE SODIUM SUCCINATE 60 MG: 40 INJECTION, POWDER, FOR SOLUTION INTRAMUSCULAR; INTRAVENOUS at 01:19

## 2021-10-31 RX ADMIN — ARFORMOTEROL TARTRATE 15 MCG: 15 SOLUTION RESPIRATORY (INHALATION) at 20:00

## 2021-10-31 RX ADMIN — IPRATROPIUM BROMIDE AND ALBUTEROL SULFATE 3 ML: .5; 3 SOLUTION RESPIRATORY (INHALATION) at 04:00

## 2021-10-31 RX ADMIN — IPRATROPIUM BROMIDE AND ALBUTEROL SULFATE 3 ML: .5; 3 SOLUTION RESPIRATORY (INHALATION) at 00:00

## 2021-10-31 RX ADMIN — NYSTATIN: 100000 POWDER TOPICAL at 17:13

## 2021-10-31 RX ADMIN — METHYLPREDNISOLONE SODIUM SUCCINATE 60 MG: 40 INJECTION, POWDER, FOR SOLUTION INTRAMUSCULAR; INTRAVENOUS at 17:12

## 2021-10-31 RX ADMIN — BUPROPION HYDROCHLORIDE 150 MG: 150 TABLET, EXTENDED RELEASE ORAL at 17:13

## 2021-10-31 RX ADMIN — TIOTROPIUM BROMIDE INHALATION SPRAY 2 PUFF: 3.12 SPRAY, METERED RESPIRATORY (INHALATION) at 08:25

## 2021-10-31 RX ADMIN — ENOXAPARIN SODIUM 40 MG: 100 INJECTION SUBCUTANEOUS at 08:57

## 2021-10-31 RX ADMIN — Medication 81 MG: at 08:56

## 2021-10-31 RX ADMIN — ATORVASTATIN CALCIUM: 10 TABLET, FILM COATED ORAL at 08:56

## 2021-10-31 RX ADMIN — Medication 10 ML: at 14:16

## 2021-10-31 RX ADMIN — IPRATROPIUM BROMIDE AND ALBUTEROL SULFATE 3 ML: .5; 3 SOLUTION RESPIRATORY (INHALATION) at 12:36

## 2021-10-31 RX ADMIN — BUPROPION HYDROCHLORIDE 150 MG: 150 TABLET, EXTENDED RELEASE ORAL at 08:56

## 2021-10-31 NOTE — CONSULTS
CARDIOLOGY Consult                 Reason For Consult: Edema    Subjective:     Patient is a 77 y.o. female with a PMH of hypertension, chronic hypoxemic respiratory failure (supplemental oxygen 4 L via nasal cannula at baseline), and COPD for whom we were consulted for edema. The patient presented with dyspnea and edema. She has a WBC count of 3.9, hemoglobin of 8.4, and platelet count of 257N. She has had mild hyponatremia since October 25. Her NT proBNP was 549. She had a chest x-ray on October 29 that was concerning for pulmonary edema. Troponin was 19.2 followed by 17.5. The patient has been on IV Lasix for last 2 days; however, it has been held today with her EGFR going from over 60 to 50. The patient had an echocardiogram on October 29 that was noted to demonstrate an EF of 60 to 65%. She denies palpitations and angina.     Past Medical History:   Diagnosis Date    Abnormal glucose level     Anxiety     Back pain     Chronic pain disorder     COPD (chronic obstructive pulmonary disease) (HCC)     Depression     Elevated blood sugar     Elevated IOP     Essential hypertension, benign     Fatigue     Fibromyalgia     Fracture of distal fibula     GERD (gastroesophageal reflux disease)     Ground glass opacity present on imaging of lung     Hyperlipidemia     Hypokalemia     Menopausal problem     Metabolic syndrome     Nausea     Obesity     Osteopenia     URI (upper respiratory infection)       Past Surgical History:   Procedure Laterality Date    COLONOSCOPY N/A 10/13/2016    COL[facility] Dennis Temple [facility] END[facility]COPY    HX CHOLECYSTECTOMY      HX HYSTERECTOMY      HX TONSILLECTOMY        Family History   Problem Relation Age of Onset    Breast Cancer Paternal Grandmother     Other Mother         aneurysm    Hypertension Mother     Cancer Paternal Aunt         brain    Cancer Paternal Uncle         mouth    Heart Disease Paternal Grandfather     Heart Attack Paternal Grandfather       Social History     Tobacco Use    Smoking status: Former Smoker     Packs/day: 3.00     Years: 40.00     Pack years: 120.00     Quit date: 2015     Years since quittin.8    Smokeless tobacco: Never Used   Substance Use Topics    Alcohol use: No      No Known Allergies      Constitutional:   Negative unexplained weight loss. Eyes:   Negative for unexplained blindness. ENT:   Negative for unexplained hearing loss. Respiratory:   Negative for unexplained hemoptysis. Cardiovascular:   Negative except as noted in HPI. Gastrointestinal:   Negative for unexplained vomiting. Genitourinary:   Negative for unexplained hematuria. Integumentary:   Negative for unexplained rash. Hematologic/Lymphatic:   Negative for unexplained excessive bleeding. Musculoskeletal:  Negative for unexplained joint pain. Neurological:   Negative for stroke. Behavioral/Psych:   Negative for suicidal ideations. Endocrine:   Negative for uncontrolled diabetic symptoms including polyuria, polydipsia.           Objective:       Visit Vitals  BP (!) 113/50 (BP 1 Location: Right lower arm, BP Patient Position: Sitting)   Pulse 87   Temp 98.2 °F (36.8 °C)   Resp 20   Ht 5' 3\" (1.6 m)   Wt 264 lb 5 oz (119.9 kg)   SpO2 96%   BMI 46.82 kg/m²       10/31 0701 - 10/31 1900  In: -   Out: 100 [Urine:100]  10/29 1901 - 10/31 07  In: 2692 [P.O.:1266]  Out: 1700 [Urine:1700]    General/Constitutional:   Alert and oriented x 3, no acute distress  HEENT:   normocephalic, atraumatic, moist mucous membranes  Neck:   No JVD or carotid bruits bilaterally  Cardiovascular:   regular rate and rhythm, no rub/gallop appreciated  Pulmonary:   clear to auscultation bilaterally, no respiratory distress  Abdomen:   soft, non-tender, non-distended  Ext:   Dusky LE appearance bilaterally with enlargement of the bilateral LEs  Skin:  warm and dry, no obvious rashes seen  Neuro:   no obvious sensory or motor deficits  Psychiatric:   normal mood and affect      ECG:   October 29  SR  RBBB    Data Review:   Recent Labs     10/31/21  0614 10/30/21  0612 10/29/21  0829 10/29/21  0829   * 135*   < > 132*   K 4.5 3.7   < > 3.8   MG  --   --   --  2.1   BUN 31* 26*   < > 28*   CREA 1.15* 0.90   < > 0.96   * 138*   < > 132*   WBC  --  3.9*  --  3.9*   HGB  --  8.4*  --  8.6*   HCT  --  27.3*  --  28.5*   PLT  --  129*  --  151   INR  --   --   --  1.1    < > = values in this interval not displayed.        Assessment/Plan:     Acute on chronic respiratory failure  - Likely multifactorial in the setting of morbid obesity, pulmonary disease, and likely HFpEF  - Echocardiogram images cannot be reviewed at [address]; however, diastolic parameters noted in the report  - TR Vmax noted to be <2.8 m/s but LA noted to be mildly dilated, E' lateral noted to be diminished, and average E/E' noted to be >14 with E/A 1.44 (parameters, as listed, would be consistent with grade 2 diastolic dysfunction)  - IVC dimension and sniff result not documented   - NT proBNP elevated and chest x-ray consistent with pulmonary edema concerning for HFpEF  - NT proBNP elevated in the setting of likely HFpEF and chronic pulmonary disease  - IV diuresis has been withheld today in the setting of a decline in her EGFR overnight: Would continue to withhold IV diuretics at this time especially in the setting of likely cirrhosis (see below)  - In the setting of likely cirrhosis, hepatopulmonary syndrome is also in the differential diagnosis    Hyperlipidemia  - Currently on Zetia/Lipitor  - Reconsider Zetia/Lipitor in the setting of likely cirrhosis    Bilateral lower extremity edema  - Likely multifactorial in the setting of hypoalbuminemia, likely cirrhosis, chronic venous insufficiency, with possible contribution from HFpEF  - IV diuresis has been withheld today in the setting of a decline in her EGFR overnight: Would continue to withhold IV diuretics at this time especially in the setting of likely cirrhosis (see below)    Cirrhosis  - Patient with pancytopenia and cirrhotic appearing liver on CT scan with ascites and splenomegaly from July 2021  - Consider a GI evaluation    Marilyn Magaña MD

## 2021-10-31 NOTE — PROGRESS NOTES
Pt is currently NPO for ordered abd US. Pt's dinner tray is in her room and pt to call for reheating her food after US.

## 2021-10-31 NOTE — PROGRESS NOTES
Hospitalist Progress Note   Admit Date:  10/29/2021  8:02 AM   Name:  Galilea Cruz   Age:  77 y.o. Sex:  female  :  1955   MRN:  [id number]   Room:  [facility]/    Presenting Complaint: Shortness of Breath and Leg Swelling    Reason(s) for Admission: Acute exacerbation of CHF (congestive heart failure) [facility]) [I50.9]     Hospital Course & Interval History:   Galilea Cruz is a 77 y.o. female with medical history of chronic hypoxemic respiratory failure (4L via NC at baseline), COPD with ongoing tobacco abuse, HTN, chronic back pain, chronic edema with probable CHF who presented with increasing dyspnea and edema. She was evaluated in the ER 10/25/2021 for the same presentation and noted to have increased symptoms > 2 weeks. Pt has underlying CHF (no echo on record) and admits to compliance with lasix. BNP was elevated 522 with trop 33.3 though cxray without gross abnl. Pt was given IV lasix and then dc'd from the ER with a 3-day prescription of lasix though daughter reports she already had the same prescription at home.     Over the past couple of days, pt has had increased orthopnea, MONTELONGO, edema / anasarca and admits to increased abdominal girth. She presented back to the ER today and cxray now revealed increased pulm vascular markings with BNP slightly increased to 549 though troponin levels improved at 19.2. Given her worsening pulmonary symptoms worsened by her underlying COPD which is now in exacerbation, Hospitalist consulted by ER for inpatient admission.      Of significance pt denies hx of CHF and in her family hx, only cardiac hx is hypertension in her mother and MI in her paternal grandfather.     Subjective (10/31/21):  Daughter at the bedside  Patient sitting in chair on 4 L/min  Says breathing and swelling of the extremities better  Chronic bilateral leg pain    Assessment & Plan:     Principal Problem:    Acute diastolic CHF (congestive heart failure) ([facility] (10/30/2021)  Improving swelling  Continue Lasix 40 mg IV twice daily  Echo shows grade 1 diastolic dysfunction  03/38/6653  Lasix held secondary to mildly decreased GFR. Later on today cardiology evaluated recommended to hold Lasix. May need further work-up of CT findings in July which showed patient had cirrhosis. Hypertension  Stable on losartan      COPD (chronic obstructive pulmonary disease) (HCC) ()  We will decrease Solu-Medrol to 40 every 8hr  Continue nebs      Chronic hypoxemic respiratory failure ([facility] (10/29/2021)  On 4 L/min at home      Anasarca (10/29/2021)  secondary to CHF, improving    Morbid obesity  BMI 46.85    Anxiety/depression  Continue Wellbutrin, Cymbalta    Hyperlipidemia  Continue home medication           Diet:  ADULT DIET Regular;  Low Sodium (2 gm); 1000 ml  DVT PPx: Lovenox  Code status: DNR    Hospital Problems as of 10/31/2021 Date Reviewed: 7/15/2021        Codes Class Noted - Resolved POA    * (Principal) Acute diastolic CHF (congestive heart failure) ([facility] ICD-10-CM: I50.31  ICD-9-CM: 428.31, 428.0  10/30/2021 - Present Unknown        Acute exacerbation of CHF (congestive heart failure) ([facility] ICD-10-CM: I50.9  ICD-9-CM: 428.0  10/29/2021 - Present Unknown        Chronic hypoxemic respiratory failure ([facility] ICD-10-CM: J96.11  ICD-9-CM: 518.83, 799.02  10/29/2021 - Present Unknown        Anasarca ICD-10-CM: R60.1  ICD-9-CM: 782.3  10/29/2021 - Present Unknown        GERD (gastroesophageal reflux disease) ICD-10-CM: K21.9  ICD-9-CM: 530.81  Unknown - Present Yes    Overview Signed 2/6/2017  8:56 AM by Tobi Rojas     EGD October 2016 showed an irregular Z line, and mild diffuse gastritis and there are biopsy reports under pathology--Dr. Alison Navarrete             Hyperlipidemia ICD-10-CM: E78.5  ICD-9-CM: 272.4  Unknown - Present Yes        Fibromyalgia ICD-10-CM: M79.7  ICD-9-CM: 729.1  Unknown - Present Yes        Anxiety ICD-10-CM: F41.9  ICD-9-CM: 300.00  Unknown - Present Yes Essential hypertension, benign ICD-10-CM: I10  ICD-9-CM: 401.1  Unknown - Present Yes        Morbid obesity ([facility] ICD-10-CM: E66.01  ICD-9-CM: 278.01  Unknown - Present Unknown        COPD (chronic obstructive pulmonary disease) ([facility] ICD-10-CM: J44.9  ICD-9-CM: 810  Unknown - Present Yes              Objective:     Patient Vitals for the past 24 hrs:   Temp Pulse Resp BP SpO2   10/31/21 1237     96 %   10/31/21 1100 98.2 °F (36.8 °C) 91 20 114/65 95 %   10/31/21 0825     96 %   10/31/21 0713 98.2 °F (36.8 °C) 87 20 (!) 113/50 97 %   10/31/21 0357     98 %   10/31/21 0219 97.7 °F (36.5 °C) 85 18 (!) 125/51 97 %   10/30/21 2352     97 %   10/30/21 2328 97.8 °F (36.6 °C) 100 18 (!) 111/49 97 %   10/30/21 1952     98 %   10/30/21 1826 98.1 °F (36.7 °C) 92 18 115/60 97 %   10/30/21 1721     97 %   10/30/21 1521 98.1 °F (36.7 °C) 93 18 (!) 101/48 96 %     Oxygen Therapy  O2 Sat (%): 96 % (10/31/21 1237)  Pulse via Oximetry: 90 beats per minute (10/31/21 1237)  O2 Device: Nasal cannula (10/31/21 1237)  Skin Assessment: Other (see comment/note) (10/29/21 0807)  O2 Flow Rate (L/min): 3 l/min (10/31/21 1237)    Estimated body mass index is 46.82 kg/m² as calculated from the following:    Height as of this encounter: 5' 3\" (1.6 m). Weight as of this encounter: 119.9 kg (264 lb 5 oz). Intake/Output Summary (Last 24 hours) at 10/31/2021 1359  Last data filed at 10/31/2021 1100  Gross per 24 hour   Intake 560 ml   Output 800 ml   Net -240 ml         Physical Exam:     Blood pressure 114/65, pulse 91, temperature 98.2 °F (36.8 °C), resp. rate 20, height 5' 3\" (1.6 m), weight 119.9 kg (264 lb 5 oz), SpO2 96 %. General:    Well nourished. Improving respiratory status, presently on 4 L/min  Head:  Normocephalic, atraumatic  Eyes:  Sclerae appear normal.  Pupils equally round. ENT:  Nares appear normal, no drainage. Moist oral mucosa  Neck:  No restricted ROM. Trachea midline   CV:   RRR.   No m/r/g.  No jugular venous distension. Lungs:   Improved bilateral wheezing and crepitation  Abdomen: Bowel sounds present. Soft, nontender, nondistended. Morbidly obese  Extremities: Improving edema upper and lower extremities  Skin:     No rashes and normal coloration. Warm and dry. Neuro:  CN II-XII grossly intact. Sensation intact. A&Ox3  Psych:  Normal mood and affect.       I have reviewed ordered lab tests and independently visualized imaging below:    Recent Labs:  Recent Results (from the past 48 hour(s))   ECHO ADULT COMPLETE    Collection Time: 10/29/21  2:11 PM   Result Value Ref Range    LV ED Vol A2C 129.00 cm3    LV ED Vol A4C 138.00 cm3    LV ES Vol A2C 32.00 cm3    LV ES Vol A4C 37.00 cm3    IVSd 1.14 (A) 0.60 - 0.90 cm    LVIDd 4.30 3.90 - 5.30 cm    LVIDs 3.37 cm    LVOT d 2.20 cm    LVOT VTI 34.60 cm    LVOT Peak Gradient 10.00 mmHg    LVPWd 1.08 (A) 0.60 - 0.90 cm    LV E' Lateral Velocity 6.02 cm/s    LV E' Septal Velocity 8.69 cm/s    Left Atrium Minor Axis 2.77 cm    Left Atrium Major Axis 5.90 cm    LA Area 2C 20.10 cm2    LA Area 4C 19.80 cm2    Aortic Valve Systolic Mean Gradient 19.71 mmHg    AoV VTI 41.10 cm    Aortic Valve Systolic Peak Velocity 424.01 cm/s    AoV PG 21.00 mmHg    Aortic Valve Area by Continuity of VTI 3.20 cm2    Aortic Valve Area by Continuity of Peak Velocity 2.62 cm2    Ao Root D 3.20 cm    Mitral Valve E Wave Deceleration Time 219.00 ms    MV A Víctor 105.00 cm/s    MV E Víctor 151.00 cm/s    MV Mean Gradient 5.00 mmHg    Mitral Valve Annulus Velocity Time Integral 46.00 cm    Pulmonic Valve Acceleration Time 140.00 ms    Pulmonic Valve Systolic Peak Instantaneous Gradient 8.00 mmHg    Est. RA Pressure 15.00 mmHg    RVIDd 4.20 cm    TR Max Velocity 217.00 cm/s    Triscuspid Valve Regurgitation Peak Gradient 19.00 mmHg    MV E/A 1.44     LV Mass .1 67.0 - 162.0 g    LV Mass AL Index 77.5 43.0 - 95.0 g/m2    E/E' lateral 25.08     E/E' septal 17.38 RVSP 34.0 mmHg    E/E' ratio (averaged) 21.23     ALLYSSA/BSA Pk Víctor 1.2 cm2/m2    ALLYSSA/BSA VTI 1.5 cm2/m2   TROPONIN-HIGH SENSITIVITY    Collection Time: 10/29/21  3:33 PM   Result Value Ref Range    Troponin-High Sensitivity 17.5 (H) 0 - 14 pg/mL   CK WITH MB    Collection Time: 10/29/21  3:33 PM   Result Value Ref Range    CK - MB 1.3 (L) 1.5 - 3.6 ng/ml    CK-MB Index 1.6 %    CK 80 21 - 215 U/L   BLOOD GAS, ARTERIAL POC    Collection Time: 10/29/21  4:01 PM   Result Value Ref Range    Device: NASAL CANNULA      FIO2 (POC) 36 %    pH (POC) 7.42 7.35 - 7.45      pCO2 (POC) 51.4 (H) 35 - 45 MMHG    pO2 (POC) 79 75 - 100 MMHG    HCO3 (POC) 33.1 (H) 22 - 26 MMOL/L    sO2 (POC) 95.4 95 - 98 %    Base excess (POC) 7.5 mmol/L    Allens test (POC) NOT APPLICABLE      Site RIGHT BRACHIAL      Specimen type (POC) ARTERIAL      Performed by Nga     CO2, POC 33 (H) 13 - 23 MMOL/L   URINALYSIS W/ RFLX MICROSCOPIC    Collection Time: 10/30/21 12:19 AM   Result Value Ref Range    Color YELLOW      Appearance CLOUDY      Specific gravity 1.008 1.001 - 1.023      pH (UA) 6.0 5.0 - 9.0      Protein Negative NEG mg/dL    Glucose Negative mg/dL    Ketone Negative NEG mg/dL    Bilirubin Negative NEG      Blood Negative NEG      Urobilinogen 0.2 0.2 - 1.0 EU/dL    Nitrites Positive (A) NEG      Leukocyte Esterase MODERATE (A) NEG      WBC 20-50 0 /hpf    RBC 0-3 0 /hpf    Epithelial cells 0 0 /hpf    Bacteria 2+ (H) 0 /hpf    Casts 0 0 /lpf   METABOLIC PANEL, COMPREHENSIVE    Collection Time: 10/30/21  6:12 AM   Result Value Ref Range    Sodium 135 (L) 136 - 145 mmol/L    Potassium 3.7 3.5 - 5.1 mmol/L    Chloride 97 (L) 98 - 107 mmol/L    CO2 35 (H) 21 - 32 mmol/L    Anion gap 3 (L) 7 - 16 mmol/L    Glucose 138 (H) 65 - 100 mg/dL    BUN 26 (H) 8 - 23 MG/DL    Creatinine 0.90 0.6 - 1.0 MG/DL    GFR est AA >60 >60 ml/min/1.73m2    GFR est non-AA >60 >60 ml/min/1.73m2    Calcium 9.2 8.3 - 10.4 MG/DL    Bilirubin, total 0.7 0.2 - 1.1 MG/DL    ALT (SGPT) 26 12 - 65 U/L    AST (SGOT) 34 15 - 37 U/L    Alk. phosphatase 123 50 - 136 U/L    Protein, total 6.7 6.3 - 8.2 g/dL    Albumin 2.7 (L) 3.2 - 4.6 g/dL    Globulin 4.0 (H) 2.3 - 3.5 g/dL    A-G Ratio 0.7 (L) 1.2 - 3.5     CBC WITH AUTOMATED DIFF    Collection Time: 10/30/21  6:12 AM   Result Value Ref Range    WBC 3.9 (L) 4.3 - 11.1 K/uL    RBC 2.93 (L) 4.05 - 5.2 M/uL    HGB 8.4 (L) 11.7 - 15.4 g/dL    HCT 27.3 (L) 35.8 - 46.3 %    MCV 93.2 79.6 - 97.8 FL    MCH 28.7 26.1 - 32.9 PG    MCHC 30.8 (L) 31.4 - 35.0 g/dL    RDW 15.7 (H) 11.9 - 14.6 %    PLATELET 725 (L) 651 - 450 K/uL    MPV 10.0 9.4 - 12.3 FL    ABSOLUTE NRBC 0.00 0.0 - 0.2 K/uL    DF AUTOMATED      NEUTROPHILS 91 (H) 43 - 78 %    LYMPHOCYTES 6 (L) 13 - 44 %    MONOCYTES 3 (L) 4.0 - 12.0 %    EOSINOPHILS 0 (L) 0.5 - 7.8 %    BASOPHILS 0 0.0 - 2.0 %    IMMATURE GRANULOCYTES 1 0.0 - 5.0 %    ABS. NEUTROPHILS 3.5 1.7 - 8.2 K/UL    ABS. LYMPHOCYTES 0.2 (L) 0.5 - 4.6 K/UL    ABS. MONOCYTES 0.1 0.1 - 1.3 K/UL    ABS. EOSINOPHILS 0.0 0.0 - 0.8 K/UL    ABS. BASOPHILS 0.0 0.0 - 0.2 K/UL    ABS. IMM. GRANS. 0.0 0.0 - 0.5 K/UL   METABOLIC PANEL, BASIC    Collection Time: 10/31/21  6:14 AM   Result Value Ref Range    Sodium 133 (L) 136 - 145 mmol/L    Potassium 4.5 3.5 - 5.1 mmol/L    Chloride 96 (L) 98 - 107 mmol/L    CO2 34 (H) 21 - 32 mmol/L    Anion gap 3 (L) 7 - 16 mmol/L    Glucose 138 (H) 65 - 100 mg/dL    BUN 31 (H) 8 - 23 MG/DL    Creatinine 1.15 (H) 0.6 - 1.0 MG/DL    GFR est AA >60 >60 ml/min/1.73m2    GFR est non-AA 50 (L) >60 ml/min/1.73m2    Calcium 9.2 8.3 - 10.4 MG/DL       All Micro Results     None          Other Studies:  No results found.     Current Meds:  Current Facility-Administered Medications   Medication Dose Route Frequency    nystatin (MYCOSTATIN) 100,000 unit/gram powder   Topical BID    aspirin delayed-release tablet 81 mg  81 mg Oral DAILY    buPROPion SR (WELLBUTRIN SR) tablet 150 mg  150 mg Oral BID    DULoxetine (CYMBALTA) capsule 60 mg  60 mg Oral DAILY    ezetimibe/atorvastatin 10/10 mg   Oral DAILY    [Held by provider] gabapentin (NEURONTIN) capsule 600 mg  600 mg Oral QHS    latanoprost (XALATAN) 0.005 % ophthalmic solution 1 Drop  1 Drop Both Eyes QHS    [Held by provider] furosemide (LASIX) injection 40 mg  40 mg IntraVENous BID    sodium chloride (NS) flush 5-40 mL  5-40 mL IntraVENous Q8H    sodium chloride (NS) flush 5-40 mL  5-40 mL IntraVENous PRN    acetaminophen (TYLENOL) tablet 650 mg  650 mg Oral Q6H PRN    Or    acetaminophen (TYLENOL) suppository 650 mg  650 mg Rectal Q6H PRN    polyethylene glycol (MIRALAX) packet 17 g  17 g Oral DAILY PRN    ondansetron (ZOFRAN ODT) tablet 4 mg  4 mg Oral Q8H PRN    Or    ondansetron (ZOFRAN) injection 4 mg  4 mg IntraVENous Q6H PRN    enoxaparin (LOVENOX) injection 40 mg  40 mg SubCUTAneous Q12H    nicotine (NICODERM CQ) 14 mg/24 hr patch 1 Patch  1 Patch TransDERmal DAILY    pantoprazole (PROTONIX) tablet 40 mg  40 mg Oral ACB    arformoteroL (BROVANA) neb solution 15 mcg  15 mcg Nebulization BID RT    budesonide (PULMICORT) 500 mcg/2 ml nebulizer suspension  500 mcg Nebulization BID RT    albuterol-ipratropium (DUO-NEB) 2.5 MG-0.5 MG/3 ML  3 mL Nebulization Q4H RT    methylPREDNISolone (PF) (SOLU-MEDROL) injection 60 mg  60 mg IntraVENous Q8H    tiotropium bromide (SPIRIVA RESPIMAT) 2.5 mcg /actuation  2 Puff Inhalation DAILY    losartan (COZAAR) tablet 50 mg  50 mg Oral DAILY    ALPRAZolam (XANAX) tablet 1 mg  1 mg Oral BID PRN    HYDROcodone-acetaminophen (NORCO)  mg tablet 1 Tablet  1 Tablet Oral Q6H PRN       Signed:  Florencia Pedro MD

## 2021-10-31 NOTE — ROUTINE PROCESS
CHF note: CHF teaching started post introduction to pt/family; aware of diagnosis. Planner/scale @ BS and will follow. Smoking/ ETOH/Illicit drug use cessation and maintain a healthy weight covered. Pt/family aware that I can not prescribe nor adjust  medications: 15mins Palliative Care score: 
Refused ACP on admissionACP on file Start 1 L/D Fluid restriction/ cardiac diet CHF teaching continues to pt/family. Emphasis on taking prescription meds as ordered, to keep F/U appts and to call MD STAT ; 
 
CHF teaching completed, verbalize emphasis on monitoring self and report to MD: 
 If you gain 2 lbs in one day or 5 lbs in a week, and short of breath.  If you can not lay flat without developing short of breath or rapid breathing at night; or if it wakes you up. Develop a cough or wheezing.  If you notice swollen hands/feet/ankles or stomach with a bloated/ full feeling.  If you are  more confused or mentally fuzzy or dizzy.  If you notice a rapid or change in your heart rate.  If you become more exhausted all the time and unable to do the same level of activity without stopping to catch your breath. Drink no more than 8 cups a day in 8 oz. cups. Limit Cola Drinks. Your Heart can not handle any more. Stay away from salt (limit anything with salt or sodium in it). Limit to 250mg per serving. Exercise needs to be started with your Doctors approval. 
Reduce stress; Call myself or Provider if assistance is needed. Pass post test via teach back, will make self available post DC ,if an questions arise. Diabetic teaching completed.  Planner/scale @ BS:  60 mins total

## 2021-10-31 NOTE — PROGRESS NOTES
Problem: Pressure Injury - Risk of  Goal: *Prevention of pressure injury  Description: Document Duong Scale and appropriate interventions in the flowsheet. Outcome: Progressing Towards Goal  Note: Pressure Injury Interventions:       Moisture Interventions: Absorbent underpads, Apply protective barrier, creams and emollients, Maintain skin hydration (lotion/cream)    Activity Interventions: Increase time out of bed, Pressure redistribution bed/mattress(bed type)    Mobility Interventions: Float heels, Pressure redistribution bed/mattress (bed type)    Nutrition Interventions: Document food/fluid/supplement intake                     Problem: Patient Education: Go to Patient Education Activity  Goal: Patient/Family Education  Outcome: Progressing Towards Goal     Problem: Patient Education: Go to Patient Education Activity  Goal: Patient/Family Education  Outcome: Progressing Towards Goal     Problem: Patient Education: Go to Patient Education Activity  Goal: Patient/Family Education  Outcome: Progressing Towards Goal     Problem: Patient Education: Go to Patient Education Activity  Goal: Patient/Family Education  Outcome: Progressing Towards Goal     Problem: Airway Clearance - Ineffective  Goal: *Patent airway  Outcome: Progressing Towards Goal  Goal: *Absence of airway secretions  Outcome: Progressing Towards Goal  Goal: *Able to cough effectively  Outcome: Progressing Towards Goal     Problem: Pain  Goal: *Control of Pain  Outcome: Progressing Towards Goal      Hourly rounds completed throughout this shift. Pt's pain managed per MAR. Pt resting in chair, call light and personal items within pt's reach; pt denies needs at this time. Will continue to monitor and report to oncoming night shift nurse.

## 2021-10-31 NOTE — PROGRESS NOTES
Therapy notes reviewed this am, recommendations for HH vs. OPT pending patient progress. SW will continue to follow for final recommendation.      Dillon Tesfaye LMSW    [facility]    * [email or web address]

## 2021-10-31 NOTE — PROGRESS NOTES
END OF SHIFT NOTE:    Intake/Output  10/30 1901 - 10/31 0700  In: 200 [P.O.:200]  Out: 500 [Urine:500]   Voiding: YES  Catheter: NO  Drain:              Stool:  0 occurrences. Stool Assessment  Stool Color: Emani Shah (10/30/21 1946)  Stool Appearance: Formed (10/30/21 1946)  Stool Amount: Medium (10/30/21 1946)  Stool Source/Status: Rectum (10/30/21 1946)    Emesis:  0 occurrences. VITAL SIGNS  Patient Vitals for the past 12 hrs:   Temp Pulse Resp BP SpO2   10/31/21 0357     98 %   10/31/21 0219 97.7 °F (36.5 °C) 85 18 (!) 125/51 97 %   10/30/21 2352     97 %   10/30/21 2328 97.8 °F (36.6 °C) 100 18 (!) 111/49 97 %   10/30/21 1952     98 %       Pain Assessment  Pain 1  Pain Scale 1: Visual (10/31/21 0219)  Pain Intensity 1: 0 (10/31/21 0219)  Patient Stated Pain Goal: 0 (10/31/21 0219)  Pain Reassessment 1: Yes (10/31/21 0219)  Pain Onset 1: chronic (10/31/21 0119)  Pain Location 1: Back (10/31/21 0119)  Pain Orientation 1: Posterior (10/31/21 0119)  Pain Description 1: Aching (10/31/21 0119)  Pain Intervention(s) 1: Medication (see MAR) (10/31/21 0119)    Ambulating  Yes    Additional Information:     Pt received PRN norco x1 for generalized pain, and PRN xanax x1. Pt reports SOB at rest, SO2 have ranged from 93-99% on 4L oxygen nc. Shift report given to oncoming nurse at the bedside.     Genaro Hsu, MATTEO

## 2021-11-01 PROBLEM — K74.60 CIRRHOSIS (HCC): Status: ACTIVE | Noted: 2021-11-01

## 2021-11-01 LAB
ALBUMIN SERPL-MCNC: 3 G/DL (ref 3.2–4.6)
ALBUMIN/GLOB SERPL: 0.7 {RATIO} (ref 1.2–3.5)
ALP SERPL-CCNC: 114 U/L (ref 50–130)
ALT SERPL-CCNC: 39 U/L (ref 12–65)
AMMONIA PLAS-SCNC: 41 UMOL/L (ref 24.9–68)
ANION GAP SERPL CALC-SCNC: 2 MMOL/L (ref 7–16)
AST SERPL-CCNC: 45 U/L (ref 15–37)
BASOPHILS # BLD: 0 K/UL (ref 0–0.2)
BASOPHILS NFR BLD: 0 % (ref 0–2)
BILIRUB SERPL-MCNC: 0.6 MG/DL (ref 0.2–1.1)
BUN SERPL-MCNC: 37 MG/DL (ref 8–23)
CALCIUM SERPL-MCNC: 9.2 MG/DL (ref 8.3–10.4)
CHLORIDE SERPL-SCNC: 95 MMOL/L (ref 98–107)
CO2 SERPL-SCNC: 34 MMOL/L (ref 21–32)
CREAT SERPL-MCNC: 1.22 MG/DL (ref 0.6–1)
DIFFERENTIAL METHOD BLD: ABNORMAL
EOSINOPHIL # BLD: 0 K/UL (ref 0–0.8)
EOSINOPHIL NFR BLD: 0 % (ref 0.5–7.8)
ERYTHROCYTE [DISTWIDTH] IN BLOOD BY AUTOMATED COUNT: 16 % (ref 11.9–14.6)
FERRITIN SERPL-MCNC: 9 NG/ML (ref 8–388)
GLOBULIN SER CALC-MCNC: 4.5 G/DL (ref 2.3–3.5)
GLUCOSE SERPL-MCNC: 133 MG/DL (ref 65–100)
HAV IGM SER QL: NONREACTIVE
HBV CORE IGM SER QL: NONREACTIVE
HBV SURFACE AG SER QL: NONREACTIVE
HCT VFR BLD AUTO: 29.6 % (ref 35.8–46.3)
HCV AB SER QL: NONREACTIVE
HGB BLD-MCNC: 8.9 G/DL (ref 11.7–15.4)
IMM GRANULOCYTES # BLD AUTO: 0 K/UL (ref 0–0.5)
IMM GRANULOCYTES NFR BLD AUTO: 0 % (ref 0–5)
LDH SERPL L TO P-CCNC: 180 U/L (ref 110–210)
LYMPHOCYTES # BLD: 0.2 K/UL (ref 0.5–4.6)
LYMPHOCYTES NFR BLD: 3 % (ref 13–44)
MCH RBC QN AUTO: 28.6 PG (ref 26.1–32.9)
MCHC RBC AUTO-ENTMCNC: 30.1 G/DL (ref 31.4–35)
MCV RBC AUTO: 95.2 FL (ref 79.6–97.8)
MONOCYTES # BLD: 0.2 K/UL (ref 0.1–1.3)
MONOCYTES NFR BLD: 3 % (ref 4–12)
NEUTS SEG # BLD: 6.5 K/UL (ref 1.7–8.2)
NEUTS SEG NFR BLD: 93 % (ref 43–78)
NRBC # BLD: 0 K/UL (ref 0–0.2)
PLATELET # BLD AUTO: 159 K/UL (ref 150–450)
PMV BLD AUTO: 9.8 FL (ref 9.4–12.3)
POTASSIUM SERPL-SCNC: 5.1 MMOL/L (ref 3.5–5.1)
PROT SERPL-MCNC: 7.5 G/DL (ref 6.3–8.2)
RBC # BLD AUTO: 3.11 M/UL (ref 4.05–5.2)
SODIUM SERPL-SCNC: 131 MMOL/L (ref 136–145)
WBC # BLD AUTO: 7 K/UL (ref 4.3–11.1)

## 2021-11-01 PROCEDURE — 74011250636 HC RX REV CODE- 250/636: Performed by: PHYSICIAN ASSISTANT

## 2021-11-01 PROCEDURE — 80053 COMPREHEN METABOLIC PANEL: CPT

## 2021-11-01 PROCEDURE — 74011250637 HC RX REV CODE- 250/637: Performed by: PHYSICIAN ASSISTANT

## 2021-11-01 PROCEDURE — 82728 ASSAY OF FERRITIN: CPT

## 2021-11-01 PROCEDURE — 36415 COLL VENOUS BLD VENIPUNCTURE: CPT

## 2021-11-01 PROCEDURE — 83615 LACTATE (LD) (LDH) ENZYME: CPT

## 2021-11-01 PROCEDURE — 65270000029 HC RM PRIVATE

## 2021-11-01 PROCEDURE — 99233 SBSQ HOSP IP/OBS HIGH 50: CPT | Performed by: INTERNAL MEDICINE

## 2021-11-01 PROCEDURE — 97530 THERAPEUTIC ACTIVITIES: CPT

## 2021-11-01 PROCEDURE — 82105 ALPHA-FETOPROTEIN SERUM: CPT

## 2021-11-01 PROCEDURE — 80074 ACUTE HEPATITIS PANEL: CPT

## 2021-11-01 PROCEDURE — 74011250636 HC RX REV CODE- 250/636: Performed by: FAMILY MEDICINE

## 2021-11-01 PROCEDURE — 74011250637 HC RX REV CODE- 250/637: Performed by: FAMILY MEDICINE

## 2021-11-01 PROCEDURE — 94760 N-INVAS EAR/PLS OXIMETRY 1: CPT

## 2021-11-01 PROCEDURE — 94640 AIRWAY INHALATION TREATMENT: CPT

## 2021-11-01 PROCEDURE — 82140 ASSAY OF AMMONIA: CPT

## 2021-11-01 PROCEDURE — 85025 COMPLETE CBC W/AUTO DIFF WBC: CPT

## 2021-11-01 PROCEDURE — 74011000250 HC RX REV CODE- 250: Performed by: PHYSICIAN ASSISTANT

## 2021-11-01 RX ADMIN — PANTOPRAZOLE SODIUM 40 MG: 40 TABLET, DELAYED RELEASE ORAL at 07:00

## 2021-11-01 RX ADMIN — BUDESONIDE 500 MCG: 0.5 INHALANT RESPIRATORY (INHALATION) at 20:00

## 2021-11-01 RX ADMIN — ENOXAPARIN SODIUM 40 MG: 100 INJECTION SUBCUTANEOUS at 09:16

## 2021-11-01 RX ADMIN — Medication 10 ML: at 22:00

## 2021-11-01 RX ADMIN — IPRATROPIUM BROMIDE AND ALBUTEROL SULFATE 3 ML: .5; 3 SOLUTION RESPIRATORY (INHALATION) at 20:00

## 2021-11-01 RX ADMIN — METHYLPREDNISOLONE SODIUM SUCCINATE 60 MG: 40 INJECTION, POWDER, FOR SOLUTION INTRAMUSCULAR; INTRAVENOUS at 01:34

## 2021-11-01 RX ADMIN — HYDROCODONE BITARTRATE AND ACETAMINOPHEN 1 TABLET: 10; 325 TABLET ORAL at 02:26

## 2021-11-01 RX ADMIN — BUPROPION HYDROCHLORIDE 150 MG: 150 TABLET, EXTENDED RELEASE ORAL at 09:17

## 2021-11-01 RX ADMIN — IPRATROPIUM BROMIDE AND ALBUTEROL SULFATE 3 ML: .5; 3 SOLUTION RESPIRATORY (INHALATION) at 07:36

## 2021-11-01 RX ADMIN — TIOTROPIUM BROMIDE INHALATION SPRAY 2 PUFF: 3.12 SPRAY, METERED RESPIRATORY (INHALATION) at 07:37

## 2021-11-01 RX ADMIN — NYSTATIN: 100000 POWDER TOPICAL at 09:16

## 2021-11-01 RX ADMIN — Medication 10 ML: at 05:27

## 2021-11-01 RX ADMIN — METHYLPREDNISOLONE SODIUM SUCCINATE 40 MG: 40 INJECTION, POWDER, FOR SOLUTION INTRAMUSCULAR; INTRAVENOUS at 09:17

## 2021-11-01 RX ADMIN — IPRATROPIUM BROMIDE AND ALBUTEROL SULFATE 3 ML: .5; 3 SOLUTION RESPIRATORY (INHALATION) at 04:00

## 2021-11-01 RX ADMIN — ATORVASTATIN CALCIUM: 10 TABLET, FILM COATED ORAL at 09:18

## 2021-11-01 RX ADMIN — DULOXETINE HYDROCHLORIDE 60 MG: 60 CAPSULE, DELAYED RELEASE ORAL at 09:18

## 2021-11-01 RX ADMIN — Medication 10 ML: at 14:00

## 2021-11-01 RX ADMIN — ALPRAZOLAM 1 MG: 0.5 TABLET ORAL at 05:26

## 2021-11-01 RX ADMIN — ARFORMOTEROL TARTRATE 15 MCG: 15 SOLUTION RESPIRATORY (INHALATION) at 07:37

## 2021-11-01 RX ADMIN — BUDESONIDE 500 MCG: 0.5 INHALANT RESPIRATORY (INHALATION) at 07:37

## 2021-11-01 RX ADMIN — IPRATROPIUM BROMIDE AND ALBUTEROL SULFATE 3 ML: .5; 3 SOLUTION RESPIRATORY (INHALATION) at 15:20

## 2021-11-01 RX ADMIN — ARFORMOTEROL TARTRATE 15 MCG: 15 SOLUTION RESPIRATORY (INHALATION) at 20:00

## 2021-11-01 RX ADMIN — IPRATROPIUM BROMIDE AND ALBUTEROL SULFATE 3 ML: .5; 3 SOLUTION RESPIRATORY (INHALATION) at 11:00

## 2021-11-01 RX ADMIN — BUPROPION HYDROCHLORIDE 150 MG: 150 TABLET, EXTENDED RELEASE ORAL at 17:31

## 2021-11-01 RX ADMIN — ENOXAPARIN SODIUM 40 MG: 100 INJECTION SUBCUTANEOUS at 21:00

## 2021-11-01 RX ADMIN — IPRATROPIUM BROMIDE AND ALBUTEROL SULFATE 3 ML: .5; 3 SOLUTION RESPIRATORY (INHALATION) at 00:00

## 2021-11-01 RX ADMIN — METHYLPREDNISOLONE SODIUM SUCCINATE 40 MG: 40 INJECTION, POWDER, FOR SOLUTION INTRAMUSCULAR; INTRAVENOUS at 21:00

## 2021-11-01 RX ADMIN — LATANOPROST 1 DROP: 50 SOLUTION OPHTHALMIC at 21:00

## 2021-11-01 RX ADMIN — NYSTATIN: 100000 POWDER TOPICAL at 17:31

## 2021-11-01 RX ADMIN — Medication 81 MG: at 09:18

## 2021-11-01 RX ADMIN — LOSARTAN POTASSIUM 50 MG: 50 TABLET, FILM COATED ORAL at 09:19

## 2021-11-01 NOTE — CONSULTS
[facility] Consult Note       Primary GI Physician: Dr. Ruyb Rosales    Referring Provider:  Dr. iZyad Kunz Date:  11/1/2021    Admit Date:  10/29/2021    Chief Complaint:  Cirrhosis    Subjective:     History of Present Illness:  Patient is a 77 y.o. female with PMH including but not limited to, COPD with chronic resp failure on 4L NC, HTN, chronic back pain, CHF (EF 60-65% on 10/29/2021),HLD, GERD, Depression, Fibromyalgia and metabolic syndrome who is seen in consultation at the request of Dr. Nani Jones for cirrhosis. She was admitted on 10/29/2021 for exacerbation of her COPD with gross anasarca. Cardiology is following who recommended GI evaluation for cirrhosis. Hepatitis panel pending. She denies hx of liver disease but was told she had fatty liver \"years ago\". Denies ETOH use. Reports increasing abd girth as well as swelling \"all over\". Denies abd pain, N/V, diarrhea or constipation. Labs on admission 10/30/21 revealed normal LFTS with T bili of 0.7, ALT 26, AST 34, , low plt 129 (now 159), HGB 8.4, Hct 27.3, MCV 93.2, RDW 15.7, WBC 3.9 (now 7.0). PT INR on 10/29/2021 is 1.1    Colonoscopy by Dr. Ruby Rosales on 10/13/2016 revealed [address] [facility] [address], tubular adenoma in the transverse and sigmoid colon. EGD on 10/13/2016 by Dr. Ruby Rosales revealed gastritis, irregular Z line, but negative pathology for Dave's esophagus    EXAM: US ABD COMP 10/31/2021  FINDINGS:   This examination was technically difficult secondary to the patient's large body  habitus and anasarca. Aorta/IVC: Visualized portions are within normal limits. Pancreas: Visualized portions are within normal limits. Liver: Shows a coarsened nodular echotexture. No discrete hepatic masses were  noted. Gallbladder: The patient is status post cholecystectomy. Sonographic Villagomez sign  was negative. Portal vein: The portal vein shows hepatopedal flow. CBD: Normal in caliber and measures 5.2 mm. Spleen:  The spleen measures 10.8  cm. There are no discrete masses. Right kidney: 10.8 cm in length and without evidence of obstruction. Left kidney: 11.4 cm in length and without evidence of obstruction. IMPRESSION  The liver shows a coarsened, nodular echotexture. This appearance is consistent  with cirrhosis. The patient is status post cholecystectomy. Visualization of the pancreas was limited. Visualized portions showed no obvious  masses. Other findings as above. EXAM: CT Chest with IV contrast - PE protocol. 7/23/2021  FINDINGS:  - Pleura/pericardium: Within normal limits. - Lungs: Mild areas of atelectasis or scarring are seen in both lung bases. A  few tiny bilateral lung nodules are unchanged. No acute infiltrate is seen. - Shayna/Mediastinum: Within normal limits. - Tracheobronchial tree: Within normal limits. - Aorta/pulmonary arteries: Within normal limits. - Heart: Within normal limits. - Coronary arteries: No coronary artery calcifications. - Chest wall: Within normal limits. - Spine/bones: There are nondisplaced acute or subacute fractures in the  anterolateral right fifth, sixth and seventh ribs. - Additional comments: Scans to the upper abdomen demonstrate a cirrhotic liver,  ascites and mild splenomegaly. IMPRESSION  1. No acute infiltrate or evidence of pulmonary embolism. 2. Acute or subacute fractures in the anterolateral right 5-7th ribs. 3. Cirrhotic liver, with ascites and splenomegaly suggestive of portal  hypertension.     PMH:  Past Medical History:   Diagnosis Date    Abnormal glucose level     Anxiety     Back pain     Chronic pain disorder     COPD (chronic obstructive pulmonary disease) (HCC)     Depression     Elevated blood sugar     Elevated IOP     Essential hypertension, benign     Fatigue     Fibromyalgia     Fracture of distal fibula     GERD (gastroesophageal reflux disease)     Ground glass opacity present on imaging of lung     Hyperlipidemia     Hypokalemia  Menopausal problem     Metabolic syndrome     Nausea     Obesity     Osteopenia     URI (upper respiratory infection)        PSH:  Past Surgical History:   Procedure Laterality Date    COLONOSCOPY N/A 10/13/2016    COLONOSCOPY performed by Sergio Agosto MD at [facility] ENDOSCOPY    HX CHOLECYSTECTOMY      HX HYSTERECTOMY      HX TONSILLECTOMY         Allergies:  No Known Allergies    Home Medications:  Prior to Admission medications    Medication Sig Start Date End Date Taking? Authorizing Provider   furosemide (LASIX) 20 mg tablet Take 1 tablet daily for the next 3 days. 10/25/21   Diana Hankins MD   predniSONE (DELTASONE) 20 mg tablet 3 PO Day 1-2  2 PO Day 3-4  1 PO day 5-6  1/2 PO day 7-8 7/23/21   Staci Fallon MD   albuterol (PROVENTIL VENTOLIN) 2.5 mg /3 mL (0.083 %) nebu 3 mL by Nebulization route every six (6) hours as needed for Wheezing or Shortness of Breath. 7/23/21   Staci Fallon MD   ALPRAZolam [facility]) 2 mg tablet Take 1 Tab by mouth two (2) times daily as needed for Anxiety. Max Daily Amount: 4 mg. 3/9/18   Fidelia Navas MD   HYDROcodone-acetaminophen [facility])  mg tablet Take 2 Tabs by mouth three (3) times daily. Max Daily Amount: 6 Tabs. 2/22/18   Fidelia Navas MD   naloxone [facility]) 4 mg/actuation nasal spray 1 [place] by IntraNASal route once as needed for up to 2 doses. As directed; may repeat every 2-3 mins until ems arrives or patient responds  Indications: OPIATE-INDUCED RESPIRATORY DEPRESSION, If stops breathing from too much pain meds 2/12/18   Fidelia Navas MD   albuterol (VENTOLIN HFA) 90 mcg/actuation inhaler Take 2 Puffs by inhalation every four (4) hours as needed for Wheezing. 2/12/18   Fidelia Navas MD   umeclidinium-vilanterol (ANORO ELLIPTA) 62.5-25 mcg/actuation inhaler Take 1 Puff by inhalation daily. 11/9/17   Fidelia Navas MD   miSOPROStol (CYTOTEC) 200 mcg tablet Take 1 Tab by mouth two (2) times a day. To coat stomach.  11/9/17 Ralf Meyer MD   ziprasidone (GEODON) 20 mg capsule Take 2 Caps by mouth nightly. Indications: Depression 11/9/17   Ralf Meyer MD   promethazine (PHENERGAN) 25 mg tablet Take 1 Tab by mouth every six (6) hours as needed for Nausea. 11/9/17   Ralf Meyer MD   Lancets (ACCU-CHEK SOFTCLIX LANCETS) misc 1 lancet two times daily. DX: E11.9 Diabetes 8/28/17   Ralf Meyer MD   glucose blood VI test strips (ACCU-CHEK LASHELL PLUS TEST STRP) strip 1 strip two times daily. DX E11.9 Diabetes 8/28/17   Ralf Meyer MD   Blood-Glucose Meter (ACCU-CHEK LASHELL PLUS METER) misc Use meter two times daily to check blood sugars. DX: E11.9 Diabetes 8/9/17   Ralf Meyer MD   buPROPion SR [facility]) 150 mg SR tablet Take 1 Tab by mouth two (2) times a day. 8/8/17   Ralf Meyer MD   DULoxetine (CYMBALTA) 60 mg capsule Take 1 Cap by mouth daily. Take 1 cap PO at bedtime    Indications: FIBROMYALGIA 8/8/17   Ralf Meyer MD   omeprazole (PRILOSEC) 40 mg capsule Take 1 Cap by mouth daily. 30 min before breakfast  Indications: HEARTBURN, To lower stomach acid 8/8/17   Ralf Meyer MD   losartan-hydroCHLOROthiazide [facility]) 50-12.5 mg per tablet Take 1 Tab by mouth daily. Indications: hypertension, To lower blood pressure / Fluid pill 8/8/17   Ralf Meyer MD   gabapentin (NEURONTIN) 600 mg tablet Take 1 Tab by mouth nightly. Indications: NEUROPATHIC PAIN 8/8/17   Ralf Meyer MD   ezetimibe-simvastatin (VYTORIN 10/20) 10-20 mg per tablet Take 1 Tab by mouth nightly. Indications: to lower cholesterol 8/8/17   Ralf Meyer MD   potassium chloride (KLOR-CON M10) 10 mEq tablet Take 1 Tab by mouth daily. 8/8/17   Ralf Meyer MD   magnesium oxide 500 mg tab Take  by mouth. Provider, Historical   mv,Ca,min-folic acid-vit K1 (ONE-A-DAY WOMEN'S 50 PLUS) 400-20 mcg tab Take 1 Tab by mouth daily.  2/6/17   Ralf Meyer MD   aspirin delayed-release 81 mg tablet Take 81 mg by mouth daily. Provider, Historical   calcium citrate-vitamin D3 (CITRACAL + D) tablet Take 2 Tabs by mouth nightly. Provider, Historical   travoprost (TRAVATAN Z) 0.004 % ophthalmic solution Administer 1 Drop to both eyes every evening.     Provider, Historical       Hospital Medications:  Current Facility-Administered Medications   Medication Dose Route Frequency    methylPREDNISolone (PF) (SOLU-MEDROL) injection 40 mg  40 mg IntraVENous Q12H    nystatin (MYCOSTATIN) 100,000 unit/gram powder   Topical BID    aspirin delayed-release tablet 81 mg  81 mg Oral DAILY    buPROPion SR (WELLBUTRIN SR) tablet 150 mg  150 mg Oral BID    DULoxetine (CYMBALTA) capsule 60 mg  60 mg Oral DAILY    ezetimibe/atorvastatin 10/10 mg   Oral DAILY    [Held by provider] gabapentin (NEURONTIN) capsule 600 mg  600 mg Oral QHS    latanoprost (XALATAN) 0.005 % ophthalmic solution 1 Drop  1 Drop Both Eyes QHS    [Held by provider] furosemide (LASIX) injection 40 mg  40 mg IntraVENous BID    sodium chloride (NS) flush 5-40 mL  5-40 mL IntraVENous Q8H    sodium chloride (NS) flush 5-40 mL  5-40 mL IntraVENous PRN    acetaminophen (TYLENOL) tablet 650 mg  650 mg Oral Q6H PRN    Or    acetaminophen (TYLENOL) suppository 650 mg  650 mg Rectal Q6H PRN    polyethylene glycol (MIRALAX) packet 17 g  17 g Oral DAILY PRN    ondansetron (ZOFRAN ODT) tablet 4 mg  4 mg Oral Q8H PRN    Or    ondansetron (ZOFRAN) injection 4 mg  4 mg IntraVENous Q6H PRN    enoxaparin (LOVENOX) injection 40 mg  40 mg SubCUTAneous Q12H    nicotine (NICODERM CQ) 14 mg/24 hr patch 1 Patch  1 Patch TransDERmal DAILY    pantoprazole (PROTONIX) tablet 40 mg  40 mg Oral ACB    arformoteroL (BROVANA) neb solution 15 mcg  15 mcg Nebulization BID RT    budesonide (PULMICORT) 500 mcg/2 ml nebulizer suspension  500 mcg Nebulization BID RT    albuterol-ipratropium (DUO-NEB) 2.5 MG-0.5 MG/3 ML  3 mL Nebulization Q4H RT    tiotropium bromide (SPIRIVA RESPIMAT) 2.5 mcg /actuation  2 Puff Inhalation DAILY    losartan (COZAAR) tablet 50 mg  50 mg Oral DAILY    ALPRAZolam (XANAX) tablet 1 mg  1 mg Oral BID PRN    HYDROcodone-acetaminophen (NORCO)  mg tablet 1 Tablet  1 Tablet Oral Q6H PRN       Social History:  Social History     Tobacco Use    Smoking status: Former Smoker     Packs/day: 3.00     Years: 40.00     Pack years: 120.00     Quit date: 2015     Years since quittin.8    Smokeless tobacco: Never Used   Substance Use Topics    Alcohol use: No       Family History:  Family History   Problem Relation Age of Onset    Breast Cancer Paternal Grandmother     Other Mother         aneurysm    Hypertension Mother     Cancer Paternal Aunt         brain    Cancer Paternal Uncle         mouth    Heart Disease Paternal Grandfather     Heart Attack Paternal Grandfather        Review of Systems:  A detailed 10 system ROS is obtained, with pertinent positives as listed above. All others are negative. Diet:  Regular 2gm sodium, 1000 mL fluid restriction     Objective:     Physical Exam:  Vitals:  Visit Vitals  BP (!) 120/56   Pulse (!) 104   Temp 97.7 °F (36.5 °C)   Resp 18   Ht 5' 3\" (1.6 m)   Wt 122.6 kg (270 lb 5 oz)   SpO2 92%   BMI 47.88 kg/m²     Gen:  Pt is alert, cooperative, no acute distress  Skin:  Extremities and face reveal no rashes. HEENT: Sclerae anicteric. Extra-occular muscles are intact. No oral ulcers. No abnormal pigmentation of the lips. The neck is supple. Cardiovascular: TACHYCARDIC No murmurs, gallops, or rubs. Respiratory:  Comfortable breathing with no accessory muscle use. Clear breath sounds anteriorly with no wheezes, rales, or rhonchi. GI:  Abdomen distended,  nontender. Normal active bowel sounds. No enlargement of the liver or spleen. No masses palpable. Rectal:  Deferred  Musculoskeletal:  +3 pitting edema of the lower legs. Neurological:  Gross memory appears intact.   Patient is alert and oriented. Psychiatric:  Mood appears appropriate with judgement intact. Lymphatic:  No cervical or supraclavicular adenopathy. Laboratory:    Recent Labs     11/01/21  0631 10/31/21  0614 10/30/21  0612   WBC 7.0  --  3.9*   HGB 8.9*  --  8.4*   HCT 29.6*  --  27.3*     --  129*   MCV 95.2  --  93.2   * 133* 135*   K 5.1 4.5 3.7   CL 95* 96* 97*   CO2 34* 34* 35*   BUN 37* 31* 26*   CREA 1.22* 1.15* 0.90   CA 9.2 9.2 9.2   * 138* 138*     --  123   AST 45*  --  34   ALT 39  --  26   TBILI 0.6  --  0.7   ALB 3.0*  --  2.7*   TP 7.5  --  6.7          Assessment:     Principal Problem:    Acute diastolic CHF (congestive heart failure) ([facility]) (10/30/2021)    Active Problems:    GERD (gastroesophageal reflux disease) ()      Overview: EGD October 2016 showed an irregular Z line, and mild diffuse gastritis       and there are biopsy reports under pathology--Dr. Mary Burrell      Hyperlipidemia ()      Fibromyalgia ()      Anxiety ()      Essential hypertension, benign ()      Morbid obesity (HCC) ()      COPD (chronic obstructive pulmonary disease) ([facility]) ()      Acute exacerbation of CHF (congestive heart failure) ([facility]) (10/29/2021)      Chronic hypoxemic respiratory failure (HCC) (10/29/2021)      Anasarca (10/29/2021)      Cirrhosis ([facility] [address] (11/1/2021)    77 y.o. female with PMH including but not limited to, COPD with chronic resp failure on 4L NC, HTN, chronic back pain, CHF (EF 60-65% on 10/29/2021),HLD, GERD, Depression, Fibromyalgia and metabolic syndrome who is seen in consultation at the request of Dr. Paloma Voss for cirrhosis. She was admitted on 10/29/2021 for exacerbation of her COPD with gross anasarca. Cardiology is following who recommended GI evaluation for cirrhosis. She has associated anasarca, thrombocytopenia and portal hypertension on recent imaging. Hepatitis panel pending. MELD Na on 10/29/2021 is 7.      Plan:     -Cirrhosis likely secondary to TANG with HLD and obesity, but hepatic congestion also likely playing a role with known CHF  -Agree with diuresis per primary and cardiology team  -Diagnostic paracentesis with albumin to calculate SAAG score  -Agree with 2gm sodium diet  -AFP  -Await hepatitis panel  -She is due repeat outpatient surveillance colonoscopy and could benefit from diagnostic EGD for portal HTN, but wait until her acute COPD and CHF exacerbation improve. Felisha Burnham NP    Patient is seen and examined in collaboration with Dr. Ruben Viveros. Assessment and plan as per Dr. Ruben Viveros.

## 2021-11-01 NOTE — PROGRESS NOTES
Hospitalist Progress Note   Admit Date:  10/29/2021  8:02 AM   Name:  Terrell Sewell   Age:  77 y.o. Sex:  female  :  1955   MRN:  [id number]   Room:  [facility]/    Presenting Complaint: Shortness of Breath and Leg Swelling    Reason(s) for Admission: Acute exacerbation of CHF (congestive heart failure) [facility]) [I50.9]     Hospital Course & Interval History:   Terrell Sewell is a 77 y.o. female with medical history of chronic hypoxemic respiratory failure (4L via NC at baseline), COPD with ongoing tobacco abuse, HTN, chronic back pain, chronic edema with probable CHF who presented with increasing dyspnea and edema. She was evaluated in the ER 10/25/2021 for the same presentation and noted to have increased symptoms > 2 weeks. Pt has underlying CHF (no echo on record) and admits to compliance with lasix. BNP was elevated 522 with trop 33.3 though cxray without gross abnl. Pt was given IV lasix and then dc'd from the ER with a 3-day prescription of lasix though daughter reports she already had the same prescription at home.     Over the past couple of days, pt has had increased orthopnea, MONTELONGO, edema / anasarca and admits to increased abdominal girth. She presented back to the ER today and cxray now revealed increased pulm vascular markings with BNP slightly increased to 549 though troponin levels improved at 19.2. Given her worsening pulmonary symptoms worsened by her underlying COPD which is now in exacerbation, Hospitalist consulted by ER for inpatient admission.      Of significance pt denies hx of CHF and in her family hx, only cardiac hx is hypertension in her mother and MI in her paternal grandfather.     Subjective (21):  Daughter at the bedside  Patient sitting in chair on 4 L/min  Says breathing and swelling of the extremities better  Chronic bilateral leg pain    Assessment & Plan:     Principal Problem:    Acute diastolic CHF (congestive heart failure) ([address] (10/30/2021)  Improving swelling  Continue Lasix 40 mg IV twice daily  Echo shows grade 1 diastolic dysfunction  86/60/6955  Lasix held secondary to mildly decreased GFR. Later on today cardiology evaluated recommended to hold Lasix. May need further work-up of CT findings in July which showed patient had cirrhosis. 11/1/2021  Lasix held since 10/31  Mild decrease in gfr  US abd- cirrhosis- consulted GI.   will order hepatitis panel    Hypertension  Stable on losartan      COPD (chronic obstructive pulmonary disease) (HCC) ()  We will decrease Solu-Medrol to 40 every 8hr  Continue nebs      Chronic hypoxemic respiratory failure ([facility] (10/29/2021)  On 4 L/min at home      Anasarca (10/29/2021)  secondary to CHF, improving    Morbid obesity  BMI 46.85    Anxiety/depression  Continue Wellbutrin, Cymbalta    Hyperlipidemia  Continue home medication           Diet:  ADULT DIET Regular;  Low Sodium (2 gm); 1000 ml  DVT PPx: Lovenox  Code status: DNR    Hospital Problems as of 11/1/2021 Date Reviewed: 7/15/2021        Codes Class Noted - Resolved POA    * (Principal) Acute diastolic CHF (congestive heart failure) ([facility] ICD-10-CM: I50.31  ICD-9-CM: 428.31, 428.0  10/30/2021 - Present Unknown        Acute exacerbation of CHF (congestive heart failure) ([facility] ICD-10-CM: I50.9  ICD-9-CM: 428.0  10/29/2021 - Present Unknown        Chronic hypoxemic respiratory failure ([facility] ICD-10-CM: J96.11  ICD-9-CM: 518.83, 799.02  10/29/2021 - Present Unknown        Anasarca ICD-10-CM: R60.1  ICD-9-CM: 782.3  10/29/2021 - Present Unknown        GERD (gastroesophageal reflux disease) ICD-10-CM: K21.9  ICD-9-CM: 530.81  Unknown - Present Yes    Overview Signed 2/6/2017  8:56 AM by José Miguel Lopez     EGD October 2016 showed an irregular Z line, and mild diffuse gastritis and there are biopsy reports under pathology--Dr. Jose Maria Haas             Hyperlipidemia ICD-10-CM: E78.5  ICD-9-CM: 272.4  Unknown - Present Yes        Fibromyalgia ICD-10-CM: M79.7  ICD-9-CM: 729.1  Unknown - Present Yes        Anxiety ICD-10-CM: F41.9  ICD-9-CM: 300.00  Unknown - Present Yes        Essential hypertension, benign ICD-10-CM: I10  ICD-9-CM: 401.1  Unknown - Present Yes        Morbid obesity ([facility] ICD-10-CM: E66.01  ICD-9-CM: 278.01  Unknown - Present Unknown        COPD (chronic obstructive pulmonary disease) ([facility] ICD-10-CM: J44.9  ICD-9-CM: 516  Unknown - Present Yes              Objective:     Patient Vitals for the past 24 hrs:   Temp Pulse Resp BP SpO2   11/01/21 1100     92 %   11/01/21 0857 97.7 °F (36.5 °C) (!) 104 18 (!) 120/56 96 %   11/01/21 0738     99 %   11/01/21 0503     100 %   11/01/21 0228     93 %   11/01/21 0201 98 °F (36.7 °C) 79 18 134/68 96 %   10/31/21 2308 98 °F (36.7 °C) 87 18 103/62 96 %   10/31/21 1950     94 %   10/31/21 1935 98.2 °F (36.8 °C) 76 18 134/60 94 %   10/31/21 1558     98 %   10/31/21 1508 98.2 °F (36.8 °C) 89 18 (!) 128/54 95 %   10/31/21 1237     96 %     Oxygen Therapy  O2 Sat (%): 92 % (11/01/21 1100)  Pulse via Oximetry: 91 beats per minute (11/01/21 1100)  O2 Device: Nasal cannula (11/01/21 1100)  Skin Assessment: Other (see comment/note) (10/29/21 0834)  O2 Flow Rate (L/min): 3 l/min (11/01/21 1100)    Estimated body mass index is 47.88 kg/m² as calculated from the following:    Height as of this encounter: 5' 3\" (1.6 m). Weight as of this encounter: 122.6 kg (270 lb 5 oz). Intake/Output Summary (Last 24 hours) at 11/1/2021 1220  Last data filed at 11/1/2021 0918  Gross per 24 hour   Intake 771 ml   Output 550 ml   Net 221 ml         Physical Exam:     Blood pressure (!) 120/56, pulse (!) 104, temperature 97.7 °F (36.5 °C), resp. rate 18, height 5' 3\" (1.6 m), weight 122.6 kg (270 lb 5 oz), SpO2 92 %. General:    Well nourished. Improving respiratory status, presently on 4 L/min  Head:  Normocephalic, atraumatic  Eyes:  Sclerae appear normal.  Pupils equally round.   ENT:  Nares appear normal, no drainage. Moist oral mucosa  Neck:  No restricted ROM. Trachea midline   CV:   RRR. No m/r/g. No jugular venous distension. Lungs:   Improved bilateral wheezing and crepitation  Abdomen: Bowel sounds present. Soft, nontender, nondistended. Morbidly obese  Extremities: Improving edema upper and lower extremities  Skin:     No rashes and normal coloration. Warm and dry. Neuro:  CN II-XII grossly intact. Sensation intact. A&Ox3  Psych:  Normal mood and affect. I have reviewed ordered lab tests and independently visualized imaging below:    Recent Labs:  Recent Results (from the past 48 hour(s))   METABOLIC PANEL, BASIC    Collection Time: 10/31/21  6:14 AM   Result Value Ref Range    Sodium 133 (L) 136 - 145 mmol/L    Potassium 4.5 3.5 - 5.1 mmol/L    Chloride 96 (L) 98 - 107 mmol/L    CO2 34 (H) 21 - 32 mmol/L    Anion gap 3 (L) 7 - 16 mmol/L    Glucose 138 (H) 65 - 100 mg/dL    BUN 31 (H) 8 - 23 MG/DL    Creatinine 1.15 (H) 0.6 - 1.0 MG/DL    GFR est AA >60 >60 ml/min/1.73m2    GFR est non-AA 50 (L) >60 ml/min/1.73m2    Calcium 9.2 8.3 - 89.7 MG/DL   METABOLIC PANEL, COMPREHENSIVE    Collection Time: 11/01/21  6:31 AM   Result Value Ref Range    Sodium 131 (L) 136 - 145 mmol/L    Potassium 5.1 3.5 - 5.1 mmol/L    Chloride 95 (L) 98 - 107 mmol/L    CO2 34 (H) 21 - 32 mmol/L    Anion gap 2 (L) 7 - 16 mmol/L    Glucose 133 (H) 65 - 100 mg/dL    BUN 37 (H) 8 - 23 MG/DL    Creatinine 1.22 (H) 0.6 - 1.0 MG/DL    GFR est AA 57 (L) >60 ml/min/1.73m2    GFR est non-AA 47 (L) >60 ml/min/1.73m2    Calcium 9.2 8.3 - 10.4 MG/DL    Bilirubin, total 0.6 0.2 - 1.1 MG/DL    ALT (SGPT) 39 12 - 65 U/L    AST (SGOT) 45 (H) 15 - 37 U/L    Alk.  phosphatase 114 50 - 130 U/L    Protein, total 7.5 6.3 - 8.2 g/dL    Albumin 3.0 (L) 3.2 - 4.6 g/dL    Globulin 4.5 (H) 2.3 - 3.5 g/dL    A-G Ratio 0.7 (L) 1.2 - 3.5     CBC WITH AUTOMATED DIFF    Collection Time: 11/01/21  6:31 AM   Result Value Ref Range    WBC 7.0 4.3 - 11.1 K/uL    RBC 3.11 (L) 4.05 - 5.2 M/uL    HGB 8.9 (L) 11.7 - 15.4 g/dL    HCT 29.6 (L) 35.8 - 46.3 %    MCV 95.2 79.6 - 97.8 FL    MCH 28.6 26.1 - 32.9 PG    MCHC 30.1 (L) 31.4 - 35.0 g/dL    RDW 16.0 (H) 11.9 - 14.6 %    PLATELET 642 798 - 738 K/uL    MPV 9.8 9.4 - 12.3 FL    ABSOLUTE NRBC 0.00 0.0 - 0.2 K/uL    DF AUTOMATED      NEUTROPHILS 93 (H) 43 - 78 %    LYMPHOCYTES 3 (L) 13 - 44 %    MONOCYTES 3 (L) 4.0 - 12.0 %    EOSINOPHILS 0 (L) 0.5 - 7.8 %    BASOPHILS 0 0.0 - 2.0 %    IMMATURE GRANULOCYTES 0 0.0 - 5.0 %    ABS. NEUTROPHILS 6.5 1.7 - 8.2 K/UL    ABS. LYMPHOCYTES 0.2 (L) 0.5 - 4.6 K/UL    ABS. MONOCYTES 0.2 0.1 - 1.3 K/UL    ABS. EOSINOPHILS 0.0 0.0 - 0.8 K/UL    ABS. BASOPHILS 0.0 0.0 - 0.2 K/UL    ABS. IMM. GRANS. 0.0 0.0 - 0.5 K/UL       All Micro Results     None          Other Studies:  US ABD COMP    Result Date: 10/31/2021  EXAM: US ABD COMP HISTORY: ct in July showing cirrhosis-- presently admitted for anasarca. TECHNIQUE: Grayscale and limited color Doppler ultrasound of the upper abdomen. COMPARISON: 5/10/2017 FINDINGS: This examination was technically difficult secondary to the patient's large body habitus and anasarca. Aorta/IVC: Visualized portions are within normal limits. Pancreas: Visualized portions are within normal limits. Liver: Shows a coarsened nodular echotexture. No discrete hepatic masses were noted. Gallbladder: The patient is status post cholecystectomy. Sonographic Villagomez sign was negative. Portal vein: The portal vein shows hepatopedal flow. CBD: Normal in caliber and measures 5.2 mm. Spleen: The spleen measures 10.8  cm. There are no discrete masses. Right kidney: 10.8 cm in length and without evidence of obstruction. Left kidney: 11.4 cm in length and without evidence of obstruction. The liver shows a coarsened, nodular echotexture. This appearance is consistent with cirrhosis. The patient is status post cholecystectomy.  Visualization of the pancreas was limited. Visualized portions showed no obvious masses. Other findings as above.       Current Meds:  Current Facility-Administered Medications   Medication Dose Route Frequency    methylPREDNISolone (PF) (SOLU-MEDROL) injection 40 mg  40 mg IntraVENous Q12H    nystatin (MYCOSTATIN) 100,000 unit/gram powder   Topical BID    aspirin delayed-release tablet 81 mg  81 mg Oral DAILY    buPROPion SR (WELLBUTRIN SR) tablet 150 mg  150 mg Oral BID    DULoxetine (CYMBALTA) capsule 60 mg  60 mg Oral DAILY    ezetimibe/atorvastatin 10/10 mg   Oral DAILY    [Held by provider] gabapentin (NEURONTIN) capsule 600 mg  600 mg Oral QHS    latanoprost (XALATAN) 0.005 % ophthalmic solution 1 Drop  1 Drop Both Eyes QHS    [Held by provider] furosemide (LASIX) injection 40 mg  40 mg IntraVENous BID    sodium chloride (NS) flush 5-40 mL  5-40 mL IntraVENous Q8H    sodium chloride (NS) flush 5-40 mL  5-40 mL IntraVENous PRN    acetaminophen (TYLENOL) tablet 650 mg  650 mg Oral Q6H PRN    Or    acetaminophen (TYLENOL) suppository 650 mg  650 mg Rectal Q6H PRN    polyethylene glycol (MIRALAX) packet 17 g  17 g Oral DAILY PRN    ondansetron (ZOFRAN ODT) tablet 4 mg  4 mg Oral Q8H PRN    Or    ondansetron (ZOFRAN) injection 4 mg  4 mg IntraVENous Q6H PRN    enoxaparin (LOVENOX) injection 40 mg  40 mg SubCUTAneous Q12H    nicotine (NICODERM CQ) 14 mg/24 hr patch 1 Patch  1 Patch TransDERmal DAILY    pantoprazole (PROTONIX) tablet 40 mg  40 mg Oral ACB    arformoteroL (BROVANA) neb solution 15 mcg  15 mcg Nebulization BID RT    budesonide (PULMICORT) 500 mcg/2 ml nebulizer suspension  500 mcg Nebulization BID RT    albuterol-ipratropium (DUO-NEB) 2.5 MG-0.5 MG/3 ML  3 mL Nebulization Q4H RT    tiotropium bromide (SPIRIVA RESPIMAT) 2.5 mcg /actuation  2 Puff Inhalation DAILY    losartan (COZAAR) tablet 50 mg  50 mg Oral DAILY    ALPRAZolam (XANAX) tablet 1 mg  1 mg Oral BID PRN    HYDROcodone-acetaminophen (NORCO)  mg tablet 1 Tablet  1 Tablet Oral Q6H PRN       Signed:  Ty Romero MD

## 2021-11-01 NOTE — PROGRESS NOTES
Problem: Mobility Impaired (Adult and Pediatric)  Goal: *Acute Goals and Plan of Care (Insert Text)  Outcome: Progressing Towards Goal  Note:     LTG:  (1.)Ms. Pat Clark will move from supine to sit and sit to supine  in bed with MODIFIED INDEPENDENCE within 7 treatment day(s). (2.)Ms. Pat Clark will transfer from bed to chair and chair to bed with MODIFIED INDEPENDENCE using the least restrictive device within 7 treatment day(s). (3.)Ms. Whitaker will ambulate with SUPERVISION for 300 feet with the least restrictive device within 7 treatment day(s). ________________________________________________________________________________________________        PHYSICAL THERAPY: Daily Note and AM 11/1/2021  INPATIENT: PT Visit Days : 2  Payor: Natalie Castañeda / Plan: [employer] MEDIC[employer] CHOICE PPO/PFFS / Product Type: [employer] Medicare /       NAME/AGE/GENDER: Manuel Orta is a 77 y.o. female   PRIMARY DIAGNOSIS: Acute exacerbation of CHF (congestive heart failure) ([facility]) [Y86.0] Acute diastolic CHF (congestive heart failure) ([facility]) Acute diastolic CHF (congestive heart failure) ([facility])       ICD-10: Treatment Diagnosis:    · Generalized Muscle Weakness (M62.81)  · Difficulty in walking, Not elsewhere classified (R26.2)   Precaution/Allergies:  Patient has no known allergies. ASSESSMENT:     Ms. Pat Clark presents with limited functional endurance and independence due to her acute CHF and subsequent LE swelling. She stated that it hurts to move because of the swelling. She will benefit from PT to increase her functional independence. She lives with her daughter and plans on returning home following D/C. She may need HHPT if she is still functionally limited. She transferred out of bed this afternoon and ambulated 100 ft in swartz, with portable oxygen on 4 L, pulling recliner behind her. She needed one sitting rest break then returned to the room to sit up in chair. Daugther at bedside.    11/1 sitting in the chair upon arrival.  Sit>stand with CGA. Walk 60 ft using RW with CGA and chair pulling behind her. Sat in the chair for 2 min then sit>stand with CGA. Then walk another 60 ft back to the room. State I need to use the [facility]. Sat on BSC few min then sit>stand with CGA and therapist clean up pt. Then walk over to the [facility]. Left in the chair with needs in reach and instructed to call for assistance. This section established at most recent assessment   PROBLEM LIST (Impairments causing functional limitations):  1. Decreased Strength  2. Decreased Transfer Abilities  3. Decreased Ambulation Ability/Technique  4. Decreased Balance  5. Decreased Activity Tolerance   INTERVENTIONS PLANNED: (Benefits and precautions of physical therapy have been discussed with the patient.)  1. Bed Mobility  2. Gait Training  3. Therapeutic Exercise/Strengthening  4. Transfer Training     TREATMENT PLAN: Frequency/Duration: daily for duration of hospital stay  Rehabilitation Potential For Stated Goals: Good     REHAB RECOMMENDATIONS (at time of discharge pending progress):    Placement: It is my opinion, based on this patient's performance to date, that Ms. Jen Whitaker may benefit from [address] after discharge due to the functional deficits listed above that are likely to improve with skilled rehabilitation because he/she has multiple medical issues that affect his/her functional mobility in the community. Equipment:    None at this time              HISTORY:   History of Present Injury/Illness (Reason for Referral):  [place] is a 77 y.o. female with medical history of chronic hypoxemic respiratory failure (4L via NC at baseline), COPD with ongoing tobacco abuse, HTN, chronic back pain, chronic edema with probable CHF who presented with increasing dyspnea and edema. She was evaluated in the ER 10/25/2021 for the same presentation and noted to have increased symptoms > 2 weeks.   Pt has underlying CHF (no echo on record) and admits to compliance with lasix. BNP was elevated 522 with trop 33.3 though cxray without gross abnl. Pt was given IV lasix and then dc'd from the ER with a 3-day prescription of lasix though daughter reports she already had the same prescription at home. Over the past couple of days, pt has had increased orthopnea, MONTELONGO, edema / anasarca and admits to increased abdominal girth. She presented back to the ER today and cxray now revealed increased pulm vascular markings with BNP slightly increased to 549 though troponin levels improved at 19.2. Given her worsening pulmonary symptoms worsened by her underlying COPD which is now in exacerbation, Hospitalist consulted by ER for inpatient admission. Of significance pt denies hx of CHF and in her family hx, only cardiac hx is hypertension in her mother and MI in her paternal grandfather. Past Medical History/Comorbidities:   Ms. Sintia Stuart  has a past medical history of Abnormal glucose level, Anxiety, Back pain, Chronic pain disorder, COPD (chronic obstructive pulmonary disease) ([facility] Depression, Elevated blood sugar, Elevated IOP, Essential hypertension, benign, Fatigue, Fibromyalgia, Fracture of distal fibula, GERD (gastroesophageal reflux disease), Ground glass opacity present on imaging of lung, Hyperlipidemia, Hypokalemia, Menopausal problem, Metabolic syndrome, Nausea, Obesity, Osteopenia, and URI (upper respiratory infection). Ms. Sintia Stuart  has a past surgical history that includes hx cholecystectomy; hx hysterectomy; hx tonsillectomy; and colonoscopy (N/A, 10/13/2016).   Social History/Living Environment:   Home Environment: Patient room  # Steps to Enter: 6  Rails to Enter: Yes  One/Two Story Residence: One story  Living Alone: No  Support Systems: Child(luciana) (daughter)  Patient Expects to be Discharged to[de-identified] House  Current DME Used/Available at Home: Nestora Dom, rollator, Tub transfer bench  Tub or Shower Type: Tub/Shower combination  Prior Level of Function/Work/Activity:  Independent with rollator at home. Lives with her daughter     Number of Personal Factors/Comorbidities that affect the Plan of Care: 0: LOW COMPLEXITY   EXAMINATION:   Most Recent Physical Functioning:   Gross Assessment:                  Posture:     Balance:  Sitting: Intact  Standing: Pull to stand; With support Bed Mobility:     Wheelchair Mobility:     Transfers:  Sit to Stand: Contact guard assistance  Stand to Sit: Stand-by assistance  Bed to Chair: Contact guard assistance  Toilet Transfer : Contact guard assistance  Duration: 38 Minutes  Gait:     Base of Support: Widened  Speed/Zari: Pace decreased (<100 feet/min)  Gait Abnormalities: Decreased step clearance;Trunk sway increased  Distance (ft): 120 Feet (ft)  Assistive Device: Walker, rolling  Ambulation - Level of Assistance: Contact guard assistance  Interventions: Safety awareness training;Verbal cues      Body Structures Involved:  1. Muscles Body Functions Affected:  1. Movement Related Activities and Participation Affected:  1. Mobility   Number of elements that affect the Plan of Care: 3: MODERATE COMPLEXITY   CLINICAL PRESENTATION:   Presentation: Stable and uncomplicated: LOW COMPLEXITY   CLINICAL DECISION MAKING:   [facility] 6 Clicks   Basic Mobility Inpatient Short Form  How much difficulty does the patient currently have. .. Unable A Lot A Little None   1. Turning over in bed (including adjusting bedclothes, sheets and blankets)? [] 1   [] 2   [x] 3   [] 4   2. Sitting down on and standing up from a chair with arms ( e.g., wheelchair, bedside commode, etc.)   [] 1   [] 2   [x] 3   [] 4   3. Moving from lying on back to sitting on the side of the bed? [] 1   [] 2   [x] 3   [] 4   How much help from another person does the patient currently need. .. Total A Lot A Little None   4. Moving to and from a bed to a chair (including a wheelchair)?    [] 1   [] 2   [x] 3   [] 4   5.  Need to walk in hospital room? [] 1   [] 2   [x] 3   [] 4   6. Climbing 3-5 steps with a railing? [] 1   [] 2   [x] 3   [] 4   © 2007, Trustees of [facility] [employer], under license to [employer]. All rights reserved      Score:  Initial: 18 Most Recent: X (Date: -- )    Interpretation of Tool:  Represents activities that are increasingly more difficult (i.e. Bed mobility, Transfers, Gait). Medical Necessity:     · Patient is expected to demonstrate progress in   · strength, balance, and functional technique  ·  to   · increase independence with gait and transfers  · . Reason for Services/Other Comments:  · Patient continues to require skilled intervention due to   · Limited functional independence  · . Use of outcome tool(s) and clinical judgement create a POC that gives a: Clear prediction of patient's progress: LOW COMPLEXITY            TREATMENT:   (In addition to Assessment/Re-Assessment sessions the following treatments were rendered)   Pre-treatment Symptoms/Complaints:  hurting when moving  Pain: Initial:   Pain Intensity 1: 0  Post Session:       Therapeutic Activity: (  38 Minutes ):  Therapeutic activities including Bed transfers, Chair transfers [facility], and Ambulation on level ground to improve mobility, strength, and balance. Required minimal Safety awareness training;Verbal cues to promote dynamic balance in standing. Assessment today    Braces/Orthotics/Lines/Etc:   · Portable O2 for gait  · O2 Device: Nasal cannula  Treatment/Session Assessment:    · Response to Treatment:  gets short of breath during gait but able to recover well.    · Interdisciplinary Collaboration:   o Physical Therapy Assistant  o Registered Nurse  · After treatment position/precautions:   o Up in chair  o Bed/Chair-wheels locked  o Bed in low position  o Call light within reach  o RN notified  o Family at bedside   · Compliance with Program/Exercises: Compliant all of the time  · Recommendations/Intent for next treatment session: \"Next visit will focus on advancements to more challenging activities and reduction in assistance provided\".   Total Treatment Duration:  PT Patient Time In/Time Out  Time In: 1300  Time Out: Gabriela Hobson, PTA

## 2021-11-02 PROBLEM — E87.5 HYPERKALEMIA: Status: ACTIVE | Noted: 2021-11-02

## 2021-11-02 LAB
AFP-TM SERPL-MCNC: 2.4 NG/ML
ALBUMIN SERPL-MCNC: 2.7 G/DL (ref 3.2–4.6)
ALBUMIN/GLOB SERPL: 0.7 {RATIO} (ref 1.2–3.5)
ALP SERPL-CCNC: 98 U/L (ref 50–130)
ALT SERPL-CCNC: 43 U/L (ref 12–65)
ANION GAP SERPL CALC-SCNC: 5 MMOL/L (ref 7–16)
AST SERPL-CCNC: 51 U/L (ref 15–37)
BILIRUB SERPL-MCNC: 0.8 MG/DL (ref 0.2–1.1)
BUN SERPL-MCNC: 40 MG/DL (ref 8–23)
CALCIUM SERPL-MCNC: 8.8 MG/DL (ref 8.3–10.4)
CHLORIDE SERPL-SCNC: 98 MMOL/L (ref 98–107)
CO2 SERPL-SCNC: 30 MMOL/L (ref 21–32)
CREAT SERPL-MCNC: 0.99 MG/DL (ref 0.6–1)
GLOBULIN SER CALC-MCNC: 4.1 G/DL (ref 2.3–3.5)
GLUCOSE SERPL-MCNC: 111 MG/DL (ref 65–100)
INR PPP: 1.2
POTASSIUM SERPL-SCNC: 5 MMOL/L (ref 3.5–5.1)
POTASSIUM SERPL-SCNC: 5.4 MMOL/L (ref 3.5–5.1)
PROT SERPL-MCNC: 6.8 G/DL (ref 6.3–8.2)
PROTHROMBIN TIME: 15.5 SEC (ref 12.6–14.5)
SODIUM SERPL-SCNC: 133 MMOL/L (ref 136–145)

## 2021-11-02 PROCEDURE — 77010033678 HC OXYGEN DAILY

## 2021-11-02 PROCEDURE — 80053 COMPREHEN METABOLIC PANEL: CPT

## 2021-11-02 PROCEDURE — 65270000029 HC RM PRIVATE

## 2021-11-02 PROCEDURE — 94760 N-INVAS EAR/PLS OXIMETRY 1: CPT

## 2021-11-02 PROCEDURE — 74011000250 HC RX REV CODE- 250: Performed by: PHYSICIAN ASSISTANT

## 2021-11-02 PROCEDURE — 74011250636 HC RX REV CODE- 250/636: Performed by: FAMILY MEDICINE

## 2021-11-02 PROCEDURE — 74011250636 HC RX REV CODE- 250/636: Performed by: PHYSICIAN ASSISTANT

## 2021-11-02 PROCEDURE — 94640 AIRWAY INHALATION TREATMENT: CPT

## 2021-11-02 PROCEDURE — 74011250637 HC RX REV CODE- 250/637: Performed by: FAMILY MEDICINE

## 2021-11-02 PROCEDURE — 85610 PROTHROMBIN TIME: CPT

## 2021-11-02 PROCEDURE — 74011250637 HC RX REV CODE- 250/637: Performed by: PHYSICIAN ASSISTANT

## 2021-11-02 PROCEDURE — 36415 COLL VENOUS BLD VENIPUNCTURE: CPT

## 2021-11-02 PROCEDURE — P9047 ALBUMIN (HUMAN), 25%, 50ML: HCPCS | Performed by: FAMILY MEDICINE

## 2021-11-02 PROCEDURE — 84132 ASSAY OF SERUM POTASSIUM: CPT

## 2021-11-02 PROCEDURE — 99232 SBSQ HOSP IP/OBS MODERATE 35: CPT | Performed by: INTERNAL MEDICINE

## 2021-11-02 RX ORDER — ALBUMIN HUMAN 250 G/1000ML
25 SOLUTION INTRAVENOUS ONCE
Status: COMPLETED | OUTPATIENT
Start: 2021-11-02 | End: 2021-11-02

## 2021-11-02 RX ORDER — FUROSEMIDE 10 MG/ML
20 INJECTION INTRAMUSCULAR; INTRAVENOUS ONCE
Status: COMPLETED | OUTPATIENT
Start: 2021-11-02 | End: 2021-11-02

## 2021-11-02 RX ADMIN — BUDESONIDE 500 MCG: 0.5 INHALANT RESPIRATORY (INHALATION) at 19:49

## 2021-11-02 RX ADMIN — LATANOPROST 1 DROP: 50 SOLUTION OPHTHALMIC at 21:00

## 2021-11-02 RX ADMIN — NYSTATIN: 100000 POWDER TOPICAL at 17:23

## 2021-11-02 RX ADMIN — IPRATROPIUM BROMIDE AND ALBUTEROL SULFATE 3 ML: .5; 3 SOLUTION RESPIRATORY (INHALATION) at 11:18

## 2021-11-02 RX ADMIN — IPRATROPIUM BROMIDE AND ALBUTEROL SULFATE 3 ML: .5; 3 SOLUTION RESPIRATORY (INHALATION) at 14:40

## 2021-11-02 RX ADMIN — DULOXETINE HYDROCHLORIDE 60 MG: 60 CAPSULE, DELAYED RELEASE ORAL at 08:22

## 2021-11-02 RX ADMIN — Medication 81 MG: at 08:23

## 2021-11-02 RX ADMIN — IPRATROPIUM BROMIDE AND ALBUTEROL SULFATE 3 ML: .5; 3 SOLUTION RESPIRATORY (INHALATION) at 04:00

## 2021-11-02 RX ADMIN — ENOXAPARIN SODIUM 40 MG: 100 INJECTION SUBCUTANEOUS at 08:21

## 2021-11-02 RX ADMIN — ALBUMIN (HUMAN) 25 G: 0.25 INJECTION, SOLUTION INTRAVENOUS at 14:14

## 2021-11-02 RX ADMIN — NYSTATIN: 100000 POWDER TOPICAL at 08:21

## 2021-11-02 RX ADMIN — IPRATROPIUM BROMIDE AND ALBUTEROL SULFATE 3 ML: .5; 3 SOLUTION RESPIRATORY (INHALATION) at 00:00

## 2021-11-02 RX ADMIN — Medication 10 ML: at 21:14

## 2021-11-02 RX ADMIN — SODIUM ZIRCONIUM CYCLOSILICATE 10 G: 10 POWDER, FOR SUSPENSION ORAL at 09:05

## 2021-11-02 RX ADMIN — BUPROPION HYDROCHLORIDE 150 MG: 150 TABLET, EXTENDED RELEASE ORAL at 08:23

## 2021-11-02 RX ADMIN — Medication 10 ML: at 05:15

## 2021-11-02 RX ADMIN — IPRATROPIUM BROMIDE AND ALBUTEROL SULFATE 3 ML: .5; 3 SOLUTION RESPIRATORY (INHALATION) at 19:49

## 2021-11-02 RX ADMIN — ARFORMOTEROL TARTRATE 15 MCG: 15 SOLUTION RESPIRATORY (INHALATION) at 07:40

## 2021-11-02 RX ADMIN — ALPRAZOLAM 1 MG: 0.5 TABLET ORAL at 20:59

## 2021-11-02 RX ADMIN — METHYLPREDNISOLONE SODIUM SUCCINATE 40 MG: 40 INJECTION, POWDER, FOR SOLUTION INTRAMUSCULAR; INTRAVENOUS at 15:24

## 2021-11-02 RX ADMIN — BUDESONIDE 500 MCG: 0.5 INHALANT RESPIRATORY (INHALATION) at 07:39

## 2021-11-02 RX ADMIN — Medication 10 ML: at 14:00

## 2021-11-02 RX ADMIN — IPRATROPIUM BROMIDE AND ALBUTEROL SULFATE 3 ML: .5; 3 SOLUTION RESPIRATORY (INHALATION) at 07:39

## 2021-11-02 RX ADMIN — PANTOPRAZOLE SODIUM 40 MG: 40 TABLET, DELAYED RELEASE ORAL at 08:23

## 2021-11-02 RX ADMIN — METHYLPREDNISOLONE SODIUM SUCCINATE 40 MG: 40 INJECTION, POWDER, FOR SOLUTION INTRAMUSCULAR; INTRAVENOUS at 23:42

## 2021-11-02 RX ADMIN — BUPROPION HYDROCHLORIDE 150 MG: 150 TABLET, EXTENDED RELEASE ORAL at 17:23

## 2021-11-02 RX ADMIN — ATORVASTATIN CALCIUM: 10 TABLET, FILM COATED ORAL at 08:23

## 2021-11-02 RX ADMIN — METHYLPREDNISOLONE SODIUM SUCCINATE 40 MG: 40 INJECTION, POWDER, FOR SOLUTION INTRAMUSCULAR; INTRAVENOUS at 08:22

## 2021-11-02 RX ADMIN — ENOXAPARIN SODIUM 40 MG: 100 INJECTION SUBCUTANEOUS at 21:00

## 2021-11-02 RX ADMIN — ARFORMOTEROL TARTRATE 15 MCG: 15 SOLUTION RESPIRATORY (INHALATION) at 19:49

## 2021-11-02 RX ADMIN — ONDANSETRON 4 MG: 2 INJECTION INTRAMUSCULAR; INTRAVENOUS at 05:16

## 2021-11-02 RX ADMIN — IPRATROPIUM BROMIDE AND ALBUTEROL SULFATE 3 ML: .5; 3 SOLUTION RESPIRATORY (INHALATION) at 23:15

## 2021-11-02 RX ADMIN — HYDROCODONE BITARTRATE AND ACETAMINOPHEN 1 TABLET: 10; 325 TABLET ORAL at 07:04

## 2021-11-02 RX ADMIN — FUROSEMIDE 20 MG: 10 INJECTION, SOLUTION INTRAMUSCULAR; INTRAVENOUS at 15:24

## 2021-11-02 NOTE — PROGRESS NOTES
Hospitalist Progress Note   Admit Date:  10/29/2021  8:02 AM   Name:  Negin Shah   Age:  77 y.o. Sex:  female  :  1955   MRN:  [id number]   Room:  [facility]/    Presenting Complaint: Shortness of Breath and Leg Swelling    Reason(s) for Admission: Acute exacerbation of CHF (congestive heart failure) [facility]) [I50.9]     Hospital Course & Interval History:   Negin Shah is a 77 y.o. female with medical history of chronic hypoxemic respiratory failure (4L via NC at baseline), COPD with ongoing tobacco abuse, HTN, chronic back pain, chronic edema with probable CHF who presented with increasing dyspnea and edema. She was evaluated in the ER 10/25/2021 for the same presentation and noted to have increased symptoms > 2 weeks. Pt has underlying CHF (no echo on record) and admits to compliance with lasix. BNP was elevated 522 with trop 33.3 though cxray without gross abnl. Pt was given IV lasix and then dc'd from the ER with a 3-day prescription of lasix though daughter reports she already had the same prescription at home.     Over the past couple of days, pt has had increased orthopnea, MONTELONGO, edema / anasarca and admits to increased abdominal girth. She presented back to the ER today and cxray now revealed increased pulm vascular markings with BNP slightly increased to 549 though troponin levels improved at 19.2. Given her worsening pulmonary symptoms worsened by her underlying COPD which is now in exacerbation, Hospitalist consulted by ER for inpatient admission.      Of significance pt denies hx of CHF and in her family hx, only cardiac hx is hypertension in her mother and MI in her paternal grandfather. During the stay  On chronic 4 L/min. Respiratory failure secondary to combination of diastolic dysfunction and cirrhosis. (Initially noted on CT in July)  GI consulted for new cirrhosis diagnosis. Cardiology feels anasarca more from cirrhosis than diastolic heart failure.   Difficulty in diuresing as mild worsening kidney function just after 1 day of Lasix. Lasix has been held since then. Give a small dose of Lasix 20 mg after albumin infusion on 11/2/2021. Patient for paracentesis at [facility] 11/3/2021. Subjective (11/02/21):  11/1/2021  Daughter at the bedside  Patient sitting in chair on 4 L/min  Says breathing and swelling of the extremities better  Chronic bilateral leg pain    11/2/2021  Supposed to go to [facility] for paracentesis which was canceled, will be going tomorrow  Using mask instead of nasal cannula as patient says she cannot tolerate nasal cannula. Improving kidney functions. Assessment & Plan:     Principal Problem:    Acute diastolic CHF (congestive heart failure) (HCC) (10/30/2021)  Improving swelling  Continue Lasix 40 mg IV twice daily  Echo shows grade 1 diastolic dysfunction  82/51/9329  Lasix held secondary to mildly decreased GFR. Later on today cardiology evaluated recommended to hold Lasix. May need further work-up of CT findings in July which showed patient had cirrhosis. 11/1/2021  Lasix held since 10/31  Mild decrease in gfr  US abd- cirrhosis- consulted GI.   will order hepatitis panel  11/2/2021  Supposed to go for paracentesis today, canceled will be going for paracentesis 11/3/2021 to [facility]  Has improved kidney function, will order a dose of albumin and after that a small dose of Lasix 20 mg IV x1. Mild hyperkalemia  Ordered Lokelma    Hypertension  11/2/2021  Losartan has been held secondary to mild decrease in kidney function after Lasix. Kidney functions improving.         COPD exacerbation (chronic obstructive pulmonary disease) (HCC) ()  We will decrease Solu-Medrol to 40 every 12hr  Continue nebs  11/2/2021  Has bilateral wheezing  We will increase Solu-Medrol 40 mg to every 8 hourly      Chronic hypoxemic respiratory failure ([address] (10/29/2021)  On 4 L/min at home      Anasarca (10/29/2021)  secondary to CHF and cirrhosis, improving    Morbid obesity  BMI 46.85    Anxiety/depression  Continue Wellbutrin, Cymbalta    Hyperlipidemia  Continue home medication           Diet:  ADULT DIET Regular;  Low Sodium (2 gm); 1000 ml  DVT PPx: Lovenox  Code status: DNR    Hospital Problems as of 11/2/2021 Date Reviewed: 7/15/2021        Codes Class Noted - Resolved POA    Hyperkalemia ICD-10-CM: E87.5  ICD-9-CM: 276.7  11/2/2021 - Present Unknown        Cirrhosis ([place] [id number] ICD-10-CM: K74.60  ICD-9-CM: 571.5  11/1/2021 - Present Unknown        * (Principal) Acute diastolic CHF (congestive heart failure) ([place] [id number] ICD-10-CM: I50.31  ICD-9-CM: 428.31, 428.0  10/30/2021 - Present Unknown        Acute exacerbation of CHF (congestive heart failure) ([place] [id number] ICD-10-CM: I50.9  ICD-9-CM: 428.0  10/29/2021 - Present Unknown        Chronic hypoxemic respiratory failure ([place] [id number] ICD-10-CM: J96.11  ICD-9-CM: 518.83, 799.02  10/29/2021 - Present Unknown        Anasarca ICD-10-CM: R60.1  ICD-9-CM: 782.3  10/29/2021 - Present Unknown        GERD (gastroesophageal reflux disease) ICD-10-CM: K21.9  ICD-9-CM: 530.81  Unknown - Present Yes    Overview Signed 2/6/2017  8:56 AM by Smiley Ford     EGD October 2016 showed an irregular Z line, and mild diffuse gastritis and there are biopsy reports under pathology--Dr. Spencer Layton             Hyperlipidemia ICD-10-CM: E78.5  ICD-9-CM: 272.4  Unknown - Present Yes        Fibromyalgia ICD-10-CM: M79.7  ICD-9-CM: 729.1  Unknown - Present Yes        Anxiety ICD-10-CM: F41.9  ICD-9-CM: 300.00  Unknown - Present Yes        Essential hypertension, benign ICD-10-CM: I10  ICD-9-CM: 401.1  Unknown - Present Yes        Morbid obesity ([address] ICD-10-CM: E66.01  ICD-9-CM: 278.01  Unknown - Present Unknown        COPD (chronic obstructive pulmonary disease) [address] ICD-10-CM: J44.9  ICD-9-CM: 701  Unknown - Present Yes              Objective:     Patient Vitals for the past 24 hrs:   Temp Pulse Resp BP SpO2   11/02/21 1121 98.6 °F (37 °C) 99 16 135/66 93 %   11/02/21 1118     95 %   11/02/21 0804 98.5 °F (36.9 °C) 87 16 137/73 96 %   11/02/21 0742     96 %   11/02/21 0451     100 %   11/02/21 0151 98.3 °F (36.8 °C) 95 16 (!) 170/76 98 %   11/02/21 0009     98 %   11/01/21 2259 98.2 °F (36.8 °C) (!) 102 16 (!) 131/59 91 %   11/01/21 2016     99 %   11/01/21 1959 98 °F (36.7 °C) 84 16 120/61 97 %   11/01/21 1654 98.1 °F (36.7 °C) 90 16 (!) 121/56 97 %   11/01/21 1520     97 %     Oxygen Therapy  O2 Sat (%): 93 % (11/02/21 1121)  Pulse via Oximetry: 69 beats per minute (11/02/21 1118)  O2 Device: Ventimask (11/02/21 1118)  Skin Assessment: Clean, dry, & intact (11/02/21 1118)  Skin Protection for O2 Device: N/A (11/02/21 1118)  O2 Flow Rate (L/min): 9 l/min (11/02/21 1118)  FIO2 (%): 30 % (11/02/21 1118)    Estimated body mass index is 47.62 kg/m² as calculated from the following:    Height as of this encounter: 5' 3\" (1.6 m). Weight as of this encounter: 121.9 kg (268 lb 12.8 oz). Intake/Output Summary (Last 24 hours) at 11/2/2021 1352  Last data filed at 11/2/2021 1122  Gross per 24 hour   Intake 220 ml   Output 1750 ml   Net -1530 ml         Physical Exam:     Blood pressure 135/66, pulse 99, temperature 98.6 °F (37 °C), resp. rate 16, height 5' 3\" (1.6 m), weight 121.9 kg (268 lb 12.8 oz), SpO2 93 %. General:    Well nourished. Improving respiratory status, presently on 4 L/min  Head:  Normocephalic, atraumatic  Eyes:  Sclerae appear normal.  Pupils equally round. ENT:  Nares appear normal, no drainage. Moist oral mucosa  Neck:  No restricted ROM. Trachea midline   CV:   RRR. No m/r/g. No jugular venous distension. Lungs:   Improved bilateral wheezing and crepitation  Abdomen: Bowel sounds present. Soft, nontender, nondistended. Morbidly obese  Extremities: Improving edema upper and lower extremities  Skin:     No rashes and normal coloration. Warm and dry. Neuro:  CN II-XII grossly intact. Sensation intact. A&Ox3  Psych:  Normal mood and affect. I have reviewed ordered lab tests and independently visualized imaging below:    Recent Labs:  Recent Results (from the past 48 hour(s))   METABOLIC PANEL, COMPREHENSIVE    Collection Time: 11/01/21  6:31 AM   Result Value Ref Range    Sodium 131 (L) 136 - 145 mmol/L    Potassium 5.1 3.5 - 5.1 mmol/L    Chloride 95 (L) 98 - 107 mmol/L    CO2 34 (H) 21 - 32 mmol/L    Anion gap 2 (L) 7 - 16 mmol/L    Glucose 133 (H) 65 - 100 mg/dL    BUN 37 (H) 8 - 23 MG/DL    Creatinine 1.22 (H) 0.6 - 1.0 MG/DL    GFR est AA 57 (L) >60 ml/min/1.73m2    GFR est non-AA 47 (L) >60 ml/min/1.73m2    Calcium 9.2 8.3 - 10.4 MG/DL    Bilirubin, total 0.6 0.2 - 1.1 MG/DL    ALT (SGPT) 39 12 - 65 U/L    AST (SGOT) 45 (H) 15 - 37 U/L    Alk. phosphatase 114 50 - 130 U/L    Protein, total 7.5 6.3 - 8.2 g/dL    Albumin 3.0 (L) 3.2 - 4.6 g/dL    Globulin 4.5 (H) 2.3 - 3.5 g/dL    A-G Ratio 0.7 (L) 1.2 - 3.5     CBC WITH AUTOMATED DIFF    Collection Time: 11/01/21  6:31 AM   Result Value Ref Range    WBC 7.0 4.3 - 11.1 K/uL    RBC 3.11 (L) 4.05 - 5.2 M/uL    HGB 8.9 (L) 11.7 - 15.4 g/dL    HCT 29.6 (L) 35.8 - 46.3 %    MCV 95.2 79.6 - 97.8 FL    MCH 28.6 26.1 - 32.9 PG    MCHC 30.1 (L) 31.4 - 35.0 g/dL    RDW 16.0 (H) 11.9 - 14.6 %    PLATELET 474 357 - 910 K/uL    MPV 9.8 9.4 - 12.3 FL    ABSOLUTE NRBC 0.00 0.0 - 0.2 K/uL    DF AUTOMATED      NEUTROPHILS 93 (H) 43 - 78 %    LYMPHOCYTES 3 (L) 13 - 44 %    MONOCYTES 3 (L) 4.0 - 12.0 %    EOSINOPHILS 0 (L) 0.5 - 7.8 %    BASOPHILS 0 0.0 - 2.0 %    IMMATURE GRANULOCYTES 0 0.0 - 5.0 %    ABS. NEUTROPHILS 6.5 1.7 - 8.2 K/UL    ABS. LYMPHOCYTES 0.2 (L) 0.5 - 4.6 K/UL    ABS. MONOCYTES 0.2 0.1 - 1.3 K/UL    ABS. EOSINOPHILS 0.0 0.0 - 0.8 K/UL    ABS. BASOPHILS 0.0 0.0 - 0.2 K/UL    ABS. IMM.  GRANS. 0.0 0.0 - 0.5 K/UL   HEPATITIS PANEL, ACUTE    Collection Time: 11/01/21  2:24 PM   Result Value Ref Range    Hepatitis A, IgM NONREACTIVE NR      Hepatitis B core, IgM NONREACTIVE NR      Hep B Surface Ag NONREACTIVE NR      Hepatitis C virus Ab NONREACTIVE NR     FERRITIN    Collection Time: 11/01/21  2:24 PM   Result Value Ref Range    Ferritin 9 8 - 388 NG/ML   AMMONIA    Collection Time: 11/01/21  2:24 PM   Result Value Ref Range    Ammonia 41 24.9 - 68 UMOL/L   AFP, TUMOR MARKER    Collection Time: 11/01/21  2:24 PM   Result Value Ref Range    AFP, Tumor marker 2.40 <8.0 ng/mL   LD    Collection Time: 11/01/21  5:24 PM   Result Value Ref Range     110 - 323 U/L   METABOLIC PANEL, COMPREHENSIVE    Collection Time: 11/02/21  6:34 AM   Result Value Ref Range    Sodium 133 (L) 136 - 145 mmol/L    Potassium 5.4 (H) 3.5 - 5.1 mmol/L    Chloride 98 98 - 107 mmol/L    CO2 30 21 - 32 mmol/L    Anion gap 5 (L) 7 - 16 mmol/L    Glucose 111 (H) 65 - 100 mg/dL    BUN 40 (H) 8 - 23 MG/DL    Creatinine 0.99 0.6 - 1.0 MG/DL    GFR est AA >60 >60 ml/min/1.73m2    GFR est non-AA 59 (L) >60 ml/min/1.73m2    Calcium 8.8 8.3 - 10.4 MG/DL    Bilirubin, total 0.8 0.2 - 1.1 MG/DL    ALT (SGPT) 43 12 - 65 U/L    AST (SGOT) 51 (H) 15 - 37 U/L    Alk. phosphatase 98 50 - 130 U/L    Protein, total 6.8 6.3 - 8.2 g/dL    Albumin 2.7 (L) 3.2 - 4.6 g/dL    Globulin 4.1 (H) 2.3 - 3.5 g/dL    A-G Ratio 0.7 (L) 1.2 - 3.5     PROTHROMBIN TIME + INR    Collection Time: 11/02/21  6:34 AM   Result Value Ref Range    Prothrombin time 15.5 (H) 12.6 - 14.5 sec    INR 1.2         All Micro Results     Procedure Component Value Units Date/Time    CULTURE, BODY FLUID Lorena Avery [[id number]]     Order Status: Sent Specimen: Abdominal Fluid           Other Studies:  No results found.     Current Meds:  Current Facility-Administered Medications   Medication Dose Route Frequency    albumin human 25% (BUMINATE) solution 25 g  25 g IntraVENous ONCE    furosemide (LASIX) injection 20 mg  20 mg IntraVENous ONCE    methylPREDNISolone (PF) (SOLU-MEDROL) injection 40 mg  40 mg IntraVENous Q12H    nystatin (MYCOSTATIN) 100,000 unit/gram powder   Topical BID    aspirin delayed-release tablet 81 mg  81 mg Oral DAILY    buPROPion SR (WELLBUTRIN SR) tablet 150 mg  150 mg Oral BID    DULoxetine (CYMBALTA) capsule 60 mg  60 mg Oral DAILY    ezetimibe/atorvastatin 10/10 mg   Oral DAILY    [Held by provider] gabapentin (NEURONTIN) capsule 600 mg  600 mg Oral QHS    latanoprost (XALATAN) 0.005 % ophthalmic solution 1 Drop  1 Drop Both Eyes QHS    [Held by provider] furosemide (LASIX) injection 40 mg  40 mg IntraVENous BID    sodium chloride (NS) flush 5-40 mL  5-40 mL IntraVENous Q8H    sodium chloride (NS) flush 5-40 mL  5-40 mL IntraVENous PRN    acetaminophen (TYLENOL) tablet 650 mg  650 mg Oral Q6H PRN    Or    acetaminophen (TYLENOL) suppository 650 mg  650 mg Rectal Q6H PRN    polyethylene glycol (MIRALAX) packet 17 g  17 g Oral DAILY PRN    ondansetron (ZOFRAN ODT) tablet 4 mg  4 mg Oral Q8H PRN    Or    ondansetron (ZOFRAN) injection 4 mg  4 mg IntraVENous Q6H PRN    enoxaparin (LOVENOX) injection 40 mg  40 mg SubCUTAneous Q12H    nicotine (NICODERM CQ) 14 mg/24 hr patch 1 Patch  1 Patch TransDERmal DAILY    pantoprazole (PROTONIX) tablet 40 mg  40 mg Oral ACB    arformoteroL (BROVANA) neb solution 15 mcg  15 mcg Nebulization BID RT    budesonide (PULMICORT) 500 mcg/2 ml nebulizer suspension  500 mcg Nebulization BID RT    albuterol-ipratropium (DUO-NEB) 2.5 MG-0.5 MG/3 ML  3 mL Nebulization Q4H RT    tiotropium bromide (SPIRIVA RESPIMAT) 2.5 mcg /actuation  2 Puff Inhalation DAILY    [Held by provider] losartan (COZAAR) tablet 50 mg  50 mg Oral DAILY    ALPRAZolam (XANAX) tablet 1 mg  1 mg Oral BID PRN    HYDROcodone-acetaminophen (NORCO)  mg tablet 1 Tablet  1 Tablet Oral Q6H PRN       Signed:  Florencia Pedro MD

## 2021-11-02 NOTE — PROGRESS NOTES
END OF SHIFT NOTE:    Intake/Output  11/01 1901 - 11/02 0700  In: 220 [P.O.:220]  Out: 950 [Urine:950]   Voiding: YES  Catheter: NO  Drain:              Stool:  1 occurrences. Stool Assessment  Stool Color: Brown (11/02/21 0530)  Stool Appearance: Loose (11/02/21 0530)  Stool Amount: Medium (11/02/21 0530)  Stool Source/Status: Rectum (11/02/21 0530)    Emesis:  0 occurrences. VITAL SIGNS  Patient Vitals for the past 12 hrs:   Temp Pulse Resp BP SpO2   11/02/21 0451     100 %   11/02/21 0151 98.3 °F (36.8 °C) 95 16 (!) 170/76 98 %   11/02/21 0009     98 %   11/01/21 2259 98.2 °F (36.8 °C) (!) 102 16 (!) 131/59 91 %   11/01/21 2016     99 %   11/01/21 1959 98 °F (36.7 °C) 84 16 120/61 97 %       Pain Assessment  Pain 1  Pain Scale 1: Numeric (0 - 10) (11/01/21 2259)  Pain Intensity 1: 0 (11/01/21 2259)  Patient Stated Pain Goal: 0 (11/01/21 2259)  Pain Reassessment 1: Patient resting w/respiratory rate greater than 10 (11/01/21 1420)  Pain Onset 1: Chronic (10/31/21 1513)  Pain Location 1: Back (11/01/21 1420)  Pain Orientation 1: Other (comment) (Generalized) (10/31/21 1513)  Pain Description 1: Aching (11/01/21 1420)  Pain Intervention(s) 1: Back rub; Family Support;Massage (11/01/21 1420)    Ambulating  Yes    Additional Information:   -respiratory put on ventimask for comfort due to her \"ears hurting\" from the nasal canula- pt went from 4 to 9L  -pt NPO at mn due to possible paracentesis today  -zofran given 1x for nausea  -pt resting in bed with no further needs

## 2021-11-02 NOTE — ADT AUTH CERT NOTES
Comments Comment Last edited by  on  at Patient Demographics Patient Name Margoth Chun  
[id number] Legal Sex Female   
1955 Address [address] Phone [phone] (Home) [phone] (Mobile) *Preferred* Patient Demographics Patient Name Margoth Chun  
[id number] Legal Sex Female   
1955 Address [address] Phone [phone] (Home) [phone] (Mobile) *Preferred*  
CSN:  
[id number] Admit Date: Admit Time Room Bed Oct 29, 2021  8:02  [02057] 01 [1122] Attending Providers Provider Pager From To Zeynep Le MD  10/29/21 10/29/21 Matheus Billingsley MD  10/29/21 Emergency Contact(s) Name Relation Home Work Mobile Yasir Walden Child [address] Nicole Khalil Daughter [phone] Utilization Reviews 
 
  
 CLINICAL AS REQUESTED by Elaine Rodriguez RN 
 
  
Review Entered Review Status 2021 09:04 In Primary  
  
Criteria Review  , CO2 34, ANION 2, BUN 37, CREAT 1.22, GFR 47 
  
DUONEB Q4H, BROVANA NEB BID, PULMICORT NEB BID, SOLUMEDROL 60 MG Q8H IV, LASIX 40 MG BID IV (HELD), NORCO 10 PO X 1 
  
GI CONSULT 
-Cirrhosis likely secondary to TANG with HLD and obesity, but hepatic congestion also likely playing a role with known CHF 
-Agree with diuresis per primary and cardiology team 
-Diagnostic paracentesis with albumin to calculate SAAG score 
-Agree with 2gm sodium diet 
-AFP 
-Await hepatitis panel 
-She is due repeat outpatient surveillance colonoscopy and could benefit from diagnostic EGD for portal HTN, but wait until her acute COPD and CHF exacerbation improve.  
  
Gen:  Pt is alert, cooperative, no acute distress Skin:  Extremities and face reveal no rashes. HEENT: Sclerae anicteric. Extra-occular muscles are intact. No oral ulcers. No abnormal pigmentation of the lips.   The neck is supple. Cardiovascular: TACHYCARDIC No murmurs, gallops, or rubs. Respiratory:  Comfortable breathing with no accessory muscle use. Clear breath sounds anteriorly with no wheezes, rales, or rhonchi. GI:  Abdomen distended,  nontender. Normal active bowel sounds. No enlargement of the liver or spleen. No masses palpable. Rectal:  Deferred Musculoskeletal:  +3 pitting edema of the lower legs. Neurological:  Gross memory appears intact. Patient is alert and oriented. Psychiatric:  Mood appears appropriate with judgement intact. Lymphatic:  No cervical or supraclavicular adenopathy. 
  
  
IM NOTE Acute diastolic CHF (congestive heart failure) ([address] (10/30/2021) Improving swelling Continue Lasix 40 mg IV twice daily Echo shows grade 1 diastolic dysfunction 10/31/2021 Lasix held secondary to mildly decreased GFR. Later on today cardiology evaluated recommended to hold Lasix. May need further work-up of CT findings in July which showed patient had cirrhosis. 11/1/2021 Lasix held since 10/31 Mild decrease in gfr 
US abd- cirrhosis- consulted GI. 
 will order hepatitis panel 
  
  
CARD NOTE Acute diastolic CHF (congestive heart failure) ([address] (10/30/2021) -Likely predominant component of cirrhosis contributing to presentation. 
-Echo with preserved EF and normal RV size and fn (will review images but initial echo read consistent with no significant elevation in left atrial pressures). -Furthermore, worsening renal function/hyponatremia with attempted diuresis. No significant diuresis as well 
-Consideration for adding on Aldactone/maintenance diuretic as tolerated. -Defer work-up of cirrhosis to GI. 
  
  
   
  
10/31 CLINICAL AS REQUESTED by Ignacio Allen RN 
 
  
Review Entered Review Status 11/2/2021 09:03 In Primary  
  
Criteria Review 10/31 , CO2 34, ANION 3, BUN 31, CREAT 1.15, GFR 50 
  
DUONEB Q4H, BROVANA NEB BID, PULMICORT NEB BID, SOLUMEDROL 60 MG Q8H IV, LASIX 40 MG BID IV (HELD), NORCO 10 PO X 3, ZOFRAN 4 MG IV X 1 
  
ABD US: The liver shows a coarsened, nodular echotexture. This appearance is consistent 
with cirrhosis. The patient is status post cholecystectomy. Visualization of the pancreas was limited. Visualized portions showed no obvious 
masses. 
  
  
CARD CONSULT Acute on chronic respiratory failure - Likely multifactorial in the setting of morbid obesity, pulmonary disease, and likely HFpEF 
- Echocardiogram images cannot be reviewed at [facility]; however, diastolic parameters noted in the report - TR Vmax noted to be <2.8 m/s but LA noted to be mildly dilated, E' lateral noted to be diminished, and average E/E' noted to be >14 with E/A 1.44 (parameters, as listed, would be consistent with grade 2 diastolic dysfunction) - IVC dimension and sniff result not documented  
- NT proBNP elevated and chest x-ray consistent with pulmonary edema concerning for HFpEF 
- NT proBNP elevated in the setting of likely HFpEF and chronic pulmonary disease - IV diuresis has been withheld today in the setting of a decline in her EGFR overnight: Would continue to withhold IV diuretics at this time especially in the setting of likely cirrhosis (see below) - In the setting of likely cirrhosis, hepatopulmonary syndrome is also in the differential diagnosis 
  
Hyperlipidemia - Currently on Zetia/Lipitor - Leonce Brandon in the setting of likely cirrhosis 
  
Bilateral lower extremity edema - Likely multifactorial in the setting of hypoalbuminemia, likely cirrhosis, chronic venous insufficiency, with possible contribution from HFpEF 
- IV diuresis has been withheld today in the setting of a decline in her EGFR overnight: Would continue to withhold IV diuretics at this time especially in the setting of likely cirrhosis (see below) 
  
Cirrhosis - Patient with pancytopenia and cirrhotic appearing liver on CT scan with ascites and splenomegaly from July 2021 
- Consider a GI evaluation 
  
  
IM NOTE Daughter at the bedside Patient sitting in chair on 4 L/min Says breathing and swelling of the extremities better Chronic bilateral leg pain 
  
Principal Problem: 
  Acute diastolic CHF (congestive heart failure) ([address] (10/30/2021) Improving swelling Continue Lasix 40 mg IV twice daily Echo shows grade 1 diastolic dysfunction 10/31/2021 Lasix held secondary to mildly decreased GFR. Later on today cardiology evaluated recommended to hold Lasix. May need further work-up of CT findings in July which showed patient had cirrhosis.

## 2021-11-02 NOTE — PROGRESS NOTES
[facility] CARDIOLOGY PROGRESS NOTE           11/1/2021 6:03 PM    Admit Date: 10/29/2021      Subjective:     No overnight events. On 4 L nasal cannula chronically. Reports prior history of fatty liver    ROS:  Cardiovascular:  As noted above    Objective:      Vitals:    11/01/21 1654 11/01/21 1959 11/01/21 2016 11/01/21 2259   BP: (!) 121/56 120/61  (!) 131/59   Pulse: 90 84  (!) 102   Resp: 16 16     Temp: 98.1 °F (36.7 °C) 98 °F (36.7 °C)     SpO2: 97% 97% 99% 91%   Weight:       Height:           Physical Exam:  General-No Acute Distress  Neck- supple, no JVD  CV- regular rate and rhythm no MRG  Lung- clear bilaterally  Abd- soft, nontender, +distended  Ext- 2-3+ edema bilaterally. Skin- warm and dry    Data Review:   Recent Labs     11/01/21  0631 10/31/21  0614 10/30/21  0612 10/30/21  0612   * 133*   < > 135*   K 5.1 4.5   < > 3.7   BUN 37* 31*   < > 26*   CREA 1.22* 1.15*   < > 0.90   * 138*   < > 138*   WBC 7.0  --   --  3.9*   HGB 8.9*  --   --  8.4*   HCT 29.6*  --   --  27.3*     --   --  129*    < > = values in this interval not displayed. Assessment/Plan:     Principal Problem:    Acute diastolic CHF (congestive heart failure) ([address] (10/30/2021)  -Likely predominant component of cirrhosis contributing to presentation.  -Echo with preserved EF and normal RV size and fn (will review images but initial echo read consistent with no significant elevation in left atrial pressures). -Furthermore, worsening renal function/hyponatremia with attempted diuresis. No significant diuresis as well  -Consideration for adding on Aldactone/maintenance diuretic as tolerated. -Defer work-up of cirrhosis to GI.     Active Problems:    GERD (gastroesophageal reflux disease) ()      Hyperlipidemia ()      Fibromyalgia ()      Anxiety ()      Essential hypertension, benign ()      Morbid obesity (HCC) ()      COPD (chronic obstructive pulmonary disease) (HCC) ()  -on chronic 4L NC      Chronic hypoxemic respiratory failure ([facility] (10/29/2021)      Anasarca (10/29/2021)  -secondary to cirrhosis       Cirrhosis ([facility] (11/1/2021)  -Appears secondary to TANG/obesity    Ghada Duran MD  11/1/2021 6:03 PM

## 2021-11-03 ENCOUNTER — HOSPITAL ENCOUNTER (OUTPATIENT)
Dept: INTERVENTIONAL RADIOLOGY/VASCULAR | Age: 66
Discharge: HOME | DRG: 291 | End: 2021-11-03
Attending: INTERNAL MEDICINE
Payer: MEDICARE

## 2021-11-03 VITALS
RESPIRATION RATE: 22 BRPM | DIASTOLIC BLOOD PRESSURE: 53 MMHG | OXYGEN SATURATION: 93 % | HEART RATE: 98 BPM | SYSTOLIC BLOOD PRESSURE: 107 MMHG

## 2021-11-03 LAB
ALBUMIN SERPL-MCNC: 3.1 G/DL (ref 3.2–4.6)
ALBUMIN/GLOB SERPL: 0.8 {RATIO} (ref 1.2–3.5)
ALP SERPL-CCNC: 98 U/L (ref 50–136)
ALT SERPL-CCNC: 45 U/L (ref 12–65)
ANION GAP SERPL CALC-SCNC: 3 MMOL/L (ref 7–16)
AST SERPL-CCNC: 47 U/L (ref 15–37)
BASOPHILS # BLD: 0 K/UL (ref 0–0.2)
BASOPHILS NFR BLD: 0 % (ref 0–2)
BILIRUB SERPL-MCNC: 1 MG/DL (ref 0.2–1.1)
BUN SERPL-MCNC: 43 MG/DL (ref 8–23)
CALCIUM SERPL-MCNC: 9.3 MG/DL (ref 8.3–10.4)
CHLORIDE SERPL-SCNC: 96 MMOL/L (ref 98–107)
CO2 SERPL-SCNC: 34 MMOL/L (ref 21–32)
CREAT SERPL-MCNC: 0.98 MG/DL (ref 0.6–1)
DIFFERENTIAL METHOD BLD: ABNORMAL
EOSINOPHIL # BLD: 0 K/UL (ref 0–0.8)
EOSINOPHIL NFR BLD: 0 % (ref 0.5–7.8)
ERYTHROCYTE [DISTWIDTH] IN BLOOD BY AUTOMATED COUNT: 15.7 % (ref 11.9–14.6)
GLOBULIN SER CALC-MCNC: 3.8 G/DL (ref 2.3–3.5)
GLUCOSE BLD STRIP.AUTO-MCNC: 135 MG/DL (ref 65–100)
GLUCOSE SERPL-MCNC: 122 MG/DL (ref 65–100)
HCT VFR BLD AUTO: 26.6 % (ref 35.8–46.3)
HGB BLD-MCNC: 8 G/DL (ref 11.7–15.4)
IMM GRANULOCYTES # BLD AUTO: 0.1 K/UL (ref 0–0.5)
IMM GRANULOCYTES NFR BLD AUTO: 1 % (ref 0–5)
INR PPP: 1.3
LYMPHOCYTES # BLD: 0.2 K/UL (ref 0.5–4.6)
LYMPHOCYTES NFR BLD: 4 % (ref 13–44)
MCH RBC QN AUTO: 28.3 PG (ref 26.1–32.9)
MCHC RBC AUTO-ENTMCNC: 30.1 G/DL (ref 31.4–35)
MCV RBC AUTO: 94 FL (ref 79.6–97.8)
MONOCYTES # BLD: 0.3 K/UL (ref 0.1–1.3)
MONOCYTES NFR BLD: 5 % (ref 4–12)
NEUTS SEG # BLD: 5.4 K/UL (ref 1.7–8.2)
NEUTS SEG NFR BLD: 90 % (ref 43–78)
NRBC # BLD: 0.02 K/UL (ref 0–0.2)
PLATELET # BLD AUTO: 134 K/UL (ref 150–450)
PMV BLD AUTO: 9.7 FL (ref 9.4–12.3)
POTASSIUM SERPL-SCNC: 4.8 MMOL/L (ref 3.5–5.1)
POTASSIUM SERPL-SCNC: 5.3 MMOL/L (ref 3.5–5.1)
PROT SERPL-MCNC: 6.9 G/DL (ref 6.3–8.2)
PROTHROMBIN TIME: 16.4 SEC (ref 12.6–14.5)
RBC # BLD AUTO: 2.83 M/UL (ref 4.05–5.2)
SERVICE CMNT-IMP: ABNORMAL
SODIUM SERPL-SCNC: 133 MMOL/L (ref 136–145)
WBC # BLD AUTO: 6 K/UL (ref 4.3–11.1)

## 2021-11-03 PROCEDURE — 82962 GLUCOSE BLOOD TEST: CPT

## 2021-11-03 PROCEDURE — 87088 URINE BACTERIA CULTURE: CPT

## 2021-11-03 PROCEDURE — 94762 N-INVAS EAR/PLS OXIMTRY CONT: CPT

## 2021-11-03 PROCEDURE — 85025 COMPLETE CBC W/AUTO DIFF WBC: CPT

## 2021-11-03 PROCEDURE — 36415 COLL VENOUS BLD VENIPUNCTURE: CPT

## 2021-11-03 PROCEDURE — 99233 SBSQ HOSP IP/OBS HIGH 50: CPT | Performed by: INTERNAL MEDICINE

## 2021-11-03 PROCEDURE — 74011000258 HC RX REV CODE- 258: Performed by: FAMILY MEDICINE

## 2021-11-03 PROCEDURE — 84132 ASSAY OF SERUM POTASSIUM: CPT

## 2021-11-03 PROCEDURE — 74011250636 HC RX REV CODE- 250/636: Performed by: FAMILY MEDICINE

## 2021-11-03 PROCEDURE — 85610 PROTHROMBIN TIME: CPT

## 2021-11-03 PROCEDURE — 77030014146 HC TY THORCENT/PARACENT BD -B

## 2021-11-03 PROCEDURE — 94760 N-INVAS EAR/PLS OXIMETRY 1: CPT

## 2021-11-03 PROCEDURE — 74011250637 HC RX REV CODE- 250/637: Performed by: PHYSICIAN ASSISTANT

## 2021-11-03 PROCEDURE — 77010033678 HC OXYGEN DAILY

## 2021-11-03 PROCEDURE — 2709999900 HC NON-CHARGEABLE SUPPLY

## 2021-11-03 PROCEDURE — 74011000250 HC RX REV CODE- 250: Performed by: INTERNAL MEDICINE

## 2021-11-03 PROCEDURE — 87186 SC STD MICRODIL/AGAR DIL: CPT

## 2021-11-03 PROCEDURE — 74011250637 HC RX REV CODE- 250/637: Performed by: FAMILY MEDICINE

## 2021-11-03 PROCEDURE — 87086 URINE CULTURE/COLONY COUNT: CPT

## 2021-11-03 PROCEDURE — 76705 ECHO EXAM OF ABDOMEN: CPT

## 2021-11-03 PROCEDURE — 80053 COMPREHEN METABOLIC PANEL: CPT

## 2021-11-03 PROCEDURE — 74011000250 HC RX REV CODE- 250: Performed by: PHYSICIAN ASSISTANT

## 2021-11-03 PROCEDURE — 65610000001 HC ROOM ICU GENERAL

## 2021-11-03 PROCEDURE — 74011250636 HC RX REV CODE- 250/636: Performed by: PHYSICIAN ASSISTANT

## 2021-11-03 PROCEDURE — 94640 AIRWAY INHALATION TREATMENT: CPT

## 2021-11-03 PROCEDURE — 94664 DEMO&/EVAL PT USE INHALER: CPT

## 2021-11-03 RX ORDER — IPRATROPIUM BROMIDE AND ALBUTEROL SULFATE 2.5; .5 MG/3ML; MG/3ML
3 SOLUTION RESPIRATORY (INHALATION)
Status: CANCELLED | OUTPATIENT
Start: 2021-11-03

## 2021-11-03 RX ORDER — LORAZEPAM 2 MG/ML
1 INJECTION INTRAMUSCULAR ONCE
Status: COMPLETED | OUTPATIENT
Start: 2021-11-03 | End: 2021-11-03

## 2021-11-03 RX ORDER — LORAZEPAM 2 MG/ML
1 INJECTION INTRAMUSCULAR ONCE
Status: CANCELLED | OUTPATIENT
Start: 2021-11-03 | End: 2021-11-03

## 2021-11-03 RX ORDER — ENOXAPARIN SODIUM 100 MG/ML
40 INJECTION SUBCUTANEOUS EVERY 12 HOURS
Status: CANCELLED | OUTPATIENT
Start: 2021-11-03

## 2021-11-03 RX ORDER — DULOXETIN HYDROCHLORIDE 60 MG/1
60 CAPSULE, DELAYED RELEASE ORAL DAILY
Status: CANCELLED | OUTPATIENT
Start: 2021-11-03

## 2021-11-03 RX ORDER — ASPIRIN 81 MG/1
81 TABLET ORAL DAILY
Status: CANCELLED | OUTPATIENT
Start: 2021-11-03

## 2021-11-03 RX ORDER — BUDESONIDE 0.5 MG/2ML
500 INHALANT ORAL
Status: CANCELLED | OUTPATIENT
Start: 2021-11-03

## 2021-11-03 RX ORDER — SPIRONOLACTONE 25 MG/1
50 TABLET ORAL DAILY
Status: DISCONTINUED | OUTPATIENT
Start: 2021-11-04 | End: 2021-11-07 | Stop reason: HOSPADM

## 2021-11-03 RX ORDER — BUPROPION HYDROCHLORIDE 150 MG/1
150 TABLET, EXTENDED RELEASE ORAL 2 TIMES DAILY
Status: CANCELLED | OUTPATIENT
Start: 2021-11-03

## 2021-11-03 RX ORDER — FUROSEMIDE 10 MG/ML
40 INJECTION INTRAMUSCULAR; INTRAVENOUS ONCE
Status: COMPLETED | OUTPATIENT
Start: 2021-11-03 | End: 2021-11-03

## 2021-11-03 RX ORDER — BUMETANIDE 0.25 MG/ML
1 INJECTION INTRAMUSCULAR; INTRAVENOUS 2 TIMES DAILY
Status: DISCONTINUED | OUTPATIENT
Start: 2021-11-03 | End: 2021-11-06

## 2021-11-03 RX ORDER — ARFORMOTEROL TARTRATE 15 UG/2ML
15 SOLUTION RESPIRATORY (INHALATION)
Status: CANCELLED | OUTPATIENT
Start: 2021-11-03

## 2021-11-03 RX ORDER — FUROSEMIDE 10 MG/ML
INJECTION INTRAMUSCULAR; INTRAVENOUS
Status: ACTIVE
Start: 2021-11-03 | End: 2021-11-03

## 2021-11-03 RX ORDER — LATANOPROST 50 UG/ML
1 SOLUTION/ DROPS OPHTHALMIC
Status: CANCELLED | OUTPATIENT
Start: 2021-11-03

## 2021-11-03 RX ADMIN — PANTOPRAZOLE SODIUM 40 MG: 40 TABLET, DELAYED RELEASE ORAL at 07:30

## 2021-11-03 RX ADMIN — IPRATROPIUM BROMIDE AND ALBUTEROL SULFATE 3 ML: .5; 3 SOLUTION RESPIRATORY (INHALATION) at 08:38

## 2021-11-03 RX ADMIN — Medication 81 MG: at 16:32

## 2021-11-03 RX ADMIN — PIPERACILLIN AND TAZOBACTAM 4.5 G: 4; .5 INJECTION, POWDER, FOR SOLUTION INTRAVENOUS at 16:23

## 2021-11-03 RX ADMIN — IPRATROPIUM BROMIDE AND ALBUTEROL SULFATE 3 ML: .5; 3 SOLUTION RESPIRATORY (INHALATION) at 23:42

## 2021-11-03 RX ADMIN — Medication 10 ML: at 21:29

## 2021-11-03 RX ADMIN — METHYLPREDNISOLONE SODIUM SUCCINATE 40 MG: 40 INJECTION, POWDER, FOR SOLUTION INTRAMUSCULAR; INTRAVENOUS at 08:40

## 2021-11-03 RX ADMIN — LORAZEPAM 1 MG: 2 INJECTION INTRAMUSCULAR; INTRAVENOUS at 09:00

## 2021-11-03 RX ADMIN — IPRATROPIUM BROMIDE AND ALBUTEROL SULFATE 3 ML: .5; 3 SOLUTION RESPIRATORY (INHALATION) at 12:06

## 2021-11-03 RX ADMIN — HYDROCODONE BITARTRATE AND ACETAMINOPHEN 1 TABLET: 10; 325 TABLET ORAL at 21:53

## 2021-11-03 RX ADMIN — LATANOPROST 1 DROP: 50 SOLUTION OPHTHALMIC at 21:28

## 2021-11-03 RX ADMIN — ALPRAZOLAM 1 MG: 0.5 TABLET ORAL at 22:15

## 2021-11-03 RX ADMIN — ARFORMOTEROL TARTRATE 15 MCG: 15 SOLUTION RESPIRATORY (INHALATION) at 08:52

## 2021-11-03 RX ADMIN — NYSTATIN: 100000 POWDER TOPICAL at 09:00

## 2021-11-03 RX ADMIN — HYDROCODONE BITARTRATE AND ACETAMINOPHEN 1 TABLET: 10; 325 TABLET ORAL at 05:08

## 2021-11-03 RX ADMIN — IPRATROPIUM BROMIDE AND ALBUTEROL SULFATE 3 ML: .5; 3 SOLUTION RESPIRATORY (INHALATION) at 16:39

## 2021-11-03 RX ADMIN — FUROSEMIDE 40 MG: 10 INJECTION, SOLUTION INTRAMUSCULAR; INTRAVENOUS at 08:49

## 2021-11-03 RX ADMIN — ATORVASTATIN CALCIUM: 10 TABLET, FILM COATED ORAL at 16:32

## 2021-11-03 RX ADMIN — Medication 10 ML: at 16:23

## 2021-11-03 RX ADMIN — IPRATROPIUM BROMIDE AND ALBUTEROL SULFATE 3 ML: .5; 3 SOLUTION RESPIRATORY (INHALATION) at 04:00

## 2021-11-03 RX ADMIN — IPRATROPIUM BROMIDE AND ALBUTEROL SULFATE 3 ML: .5; 3 SOLUTION RESPIRATORY (INHALATION) at 20:00

## 2021-11-03 RX ADMIN — ALPRAZOLAM 1 MG: 0.5 TABLET ORAL at 06:22

## 2021-11-03 RX ADMIN — BUDESONIDE 500 MCG: 0.5 INHALANT RESPIRATORY (INHALATION) at 20:00

## 2021-11-03 RX ADMIN — DULOXETINE HYDROCHLORIDE 60 MG: 60 CAPSULE, DELAYED RELEASE ORAL at 16:32

## 2021-11-03 RX ADMIN — ARFORMOTEROL TARTRATE 15 MCG: 15 SOLUTION RESPIRATORY (INHALATION) at 20:00

## 2021-11-03 RX ADMIN — BUDESONIDE 500 MCG: 0.5 INHALANT RESPIRATORY (INHALATION) at 08:39

## 2021-11-03 RX ADMIN — ENOXAPARIN SODIUM 40 MG: 100 INJECTION SUBCUTANEOUS at 21:28

## 2021-11-03 RX ADMIN — METHYLPREDNISOLONE SODIUM SUCCINATE 40 MG: 40 INJECTION, POWDER, FOR SOLUTION INTRAMUSCULAR; INTRAVENOUS at 16:22

## 2021-11-03 RX ADMIN — BUMETANIDE 1 MG: 0.25 INJECTION INTRAMUSCULAR; INTRAVENOUS at 18:41

## 2021-11-03 RX ADMIN — Medication 10 ML: at 06:00

## 2021-11-03 NOTE — ADT AUTH CERT NOTES
Comments Comment Patient Demographics Patient Name Margoth Chun  
[id number] Legal Sex Female   
1955 Address [address] Phone [phone] (Home) [phone] (Mobile) *Preferred* Patient Demographics Patient Name Margoth Chun  
[id number] Legal Sex Female   
1955 Address [address] Phone [phone] (Home) [phone] (Mobile) *Preferred*  
CSN:  
[id number] Admit Date: Admit Time Room Bed Oct 29, 2021  8:02  [37990] 01 [1122] Attending Providers Provider Pager From To Ev Hays MD  10/29/21 10/29/21 Lary Hagan MD  10/29/21 11/03/21 Demetra Rutherford DO  21 Emergency Contact(s) Name Relation Home Work Daniele Fregoso Daughter [phone] Utilization Reviews 
 
  
 
RAPID RESPONSE NOTE 11/3 by Hetal Rutherford RN 
 
  
Review Entered Review Status 11/3/2021 12:33 In Primary  
  
Criteria Review RAPID RESPONSE ES CRITICAL CARE OUTREACH NURSE NOTE 
  
Pt was seen and examined following Rapid Response. Pt status/focused assessment upon arrival to Rapid Response as follows: 
  
SITUATION:  On arrival to RR found to be SOB RR 32 becoming tired 
  
NEURO:  Patient is arousible RESP:  Respiratory status is poor with dyspnea and substernal retraction CARDIAC: Heart rate and rhythm are pulses thready           Hr 99 Monitor shows GI: Relevant GI issues include large edematous abdomen : Relevant  issues include needs to void hard swelling abdomen 
  
Interventions completed during Rapid Response: Interventions completed or ordered during the rapid response were:  Placed mckoy, start duoneb  
  
Post Rapid Response plan of care:  Get to IR  
  
 
CLINICAL  AS REQUESTED by Hetal Rutherford RN 
 
  
Review Entered Review Status 11/3/2021 12:30 In Primary   
Criteria Review /64, TEMP 97.7, , RR 16, O2 99 ON VENT (SINCE 11/1) 
  
PT 15.5, , K 5.4, BUN 40, ALB 2.7 
  
NO IMAGING 
  
PT, OT, ELEV HOB, STRICT I/O, DAILY WGT 
  
DUONEB Q4H, BROVANA NEB BID, PULMICORT NEB BID, SOLUMEDROL 40 MG Q8H IV, ZOFRAN 4 MG IV X 1, BUMINATE 25G IV X 1, LASIX 20 MG IV X 1, LOKELMA 10G PO X 1 
  
GI CONSULT Patient is visibly SOB and paracentesis waiting till tomorrow. On exam dependent edema is impressive in legs as well as abdomen. There is obvious ascites as well. No jaundice and no easy bruising. Unfortunately despite severe lung issues patient still smokes. Patient's metabolic syndrome and prior history of fatty liver make it likely that she now has cirrhosis as well. Cr has increased with attempt at diuresis therefore cannot add spironolactone at this time. 
  
Fluid analysis will be helpful and if SAAG >1.1 and TP is > 2.5 in ascites etiology would be cardiac ascites rather than cirrhosis related ascites. Regardless patient's prognosis is guarded. Will follow 
  
  
  
IM NOTE 
11/2/2021 Supp[place] to [place] for paracentesis which was canceled, will be going tomorrow Using mask instead of nasal cannula as patient says she cannot tolerate nasal cannula. Improving kidney functions. 
  
Has improved kidney function, will order a dose of albumin and after that a small dose of Lasix 20 mg IV x1. 
 Mild hyperkalemia Ordered Lokelma 
  
Hypertension 11/2/2021 Losartan has been held secondary to mild decrease in kidney function after Lasix. Kidney functions improving.   
  
  COPD exacerbation (chronic obstructive pulmonary disease) (HCC) () We will decrease Solu-Medrol to 40 every 12hr Continue nebs 11/2/2021 Has bilateral wheezing We will increase Solu-Medrol 40 mg to every 8 hourly 
  
  Chronic hypoxemic respiratory failure ([address] (10/29/2021) On 4 L/min at home 
  
  Anasarca (10/29/2021) secondary to CHF and cirrhosis, improving 
  
Morbid obesity BMI 46.85 
  
  
CARD NOTE Physical Exam: 
General-No Acute Distress Neck- supple, no JVD 
CV- regular rate and rhythm no MRG Lung- clear bilaterally Abd- soft, nontender, +distended Ext- 2-3+ edema bilaterally. Skin- warm and dry 
  
Principal Problem: 
  Acute diastolic CHF (congestive heart failure) ([facility] (10/30/2021) -Likely predominant component of cirrhosis contributing to presentation. 
-Echo with preserved EF and normal RV size and fn (will review images but initial echo read consistent with no significant elevation in left atrial pressures). -Furthermore, worsening renal function/hyponatremia with attempted diuresis. No significant diuresis as well 
-Consideration for adding on Aldactone/maintenance diuretic as tolerated (consider po bumex); assess in AM. Appears received some IV Lasix and albumin earlier today. Also some mild hyperkalemia. -Defer work-up of cirrhosis to GI.

## 2021-11-03 NOTE — PROGRESS NOTES
OUTREACH NURSE PROGRESS REPORT    SUBJECTIVE: In to assess patient secondary to follow up. Unable to have IR for paracentesis and continues to have some effort in breathing. [place] was seen and focused assessment complete. MEWS Score: 2 (11/03/21 1109)  Vitals:    11/03/21 0843 11/03/21 0849 11/03/21 1109 11/03/21 1208   BP:  130/69 129/68    Pulse:   90    Resp:   22    Temp:   97.9 °F (36.6 °C)    SpO2: 99%   95%   Weight:       Height:              OBJECTIVE: On arrival to room, I found patient to be resting a little. ASSESSMENT:   Visit Vitals  /68 (BP 1 Location: Left arm)   Pulse 90   Temp 97.9 °F (36.6 °C)   Resp 22   Ht 5' 3\" (1.6 m)   Wt 122.3 kg (269 lb 9.6 oz)   SpO2 95%   BMI 47.76 kg/m²     General:  Alert, cooperative, no distress. Head:  Normocephalic, without obvious abnormality, atraumatic. Eyes:  Conjunctivae/corneas clear. Pupils equal, round, reactive to light. Extraocular movements intact. Lungs:   Crackles and wheezes   Chest wall:  No tenderness or deformity. Heart:  Regular rate and rhythm, S1, S2 normal, no murmur, click, rub, or gallop. Abdomen:   Soft, non-tender. Bowel sounds normal. No masses. No organomegaly. Extremities: Extremities normal, atraumatic, no cyanosis or edema. Pulses: 2+ and symmetric all extremities. Skin: Skin color, texture, turgor normal. No rashes or lesions. Lymph nodes: Cervical, supraclavicular, and axillary nodes normal.   Neurologic: CNII-XII intact. Normal strength, sensation, and reflexes throughout.           Pain Assessment  Pain Intensity 1: 0 (11/03/21 0825)  Pain Location 1: Back  Pain Intervention(s) 1: Medication (see MAR)  Patient Stated Pain Goal: 0      PLAN: continue to follow and may send to ICU

## 2021-11-03 NOTE — PROGRESS NOTES
RAPID RESPONSE ES CRITICAL CARE OUTREACH NURSE NOTE      Pt was seen and examined following Rapid Response. Pt status/focused assessment upon arrival to Rapid Response as follows:    SITUATION:  On arrival to RR found to be SOB RR 32 becoming tired    NEURO:  Patient is arousible  RESP:  Respiratory status is poor with dyspnea and substernal retraction  CARDIAC: Heart rate and rhythm are pulses thready  Hr 99   Monitor shows   GI: Relevant GI issues include large edematous abdomen  : Relevant  issues include needs to void hard swelling abdomen  Other:     LATEST VITAL SIGNS :  MEWS Score: 2 (11/03/21 0306)  Vitals:    11/03/21 0631 11/03/21 0804 11/03/21 0843 11/03/21 0849   BP: (!) 144/87 (!) 147/66  130/69   Pulse: (!) 102 94     Resp: 22 22     Temp: 98.3 °F (36.8 °C) 98.3 °F (36.8 °C)     SpO2:   99%    Weight:       Height:             LAB WORK PENDING/SENT DURING RAPID RESPONSE:     Labs and/or test ordered were urinary catheter, duoneb and prepare to transfer to [facility] with Kenzie GOODSON     Primary RN at bedside: Patient's nurse is Tricia  RT at bedside:  The primary RT is Cherri Doshi MD at bedside: The MD in charge is ANTONIO Leroy      Interventions completed during Rapid Response:     Interventions completed or ordered during the rapid response were:  Placed mckoy, start duoneb       Post Rapid Response plan of care:  Get to IR

## 2021-11-03 NOTE — PROGRESS NOTES
Progress Note    Patient: Jimenez Royal MRN: [id number]  SSN: [id number]    YOB: 1955  Age: 77 y.o. Sex: female      Admit Date: 10/29/2021    LOS: 5 days     Subjective:   F/U acute on chronic respiratory failure, cirrhosis, CHF exacerbation    \"65 y.o. female with medical history of chronic hypoxemic respiratory failure (4L via NC at baseline), COPD with ongoing tobacco abuse, HTN, chronic back pain, chronic edema with probable CHF who presented with increasing dyspnea and edema.  She was evaluated in the ER 10/25/2021 for the same presentation and noted to have increased symptoms > 2 weeks.  Pt has underlying CHF (no echo on record) and admits to compliance with lasix.  BNP was elevated 522 with trop 33.3 though cxray without gross abnl.  Pt was given IV lasix and then dc'd from the ER with a 3-day prescription of lasix though daughter reports she already had the same prescription at home.     Over the past couple of days, pt has had increased orthopnea, [place], edema / anasarca and admits to increased abdominal girth.  She presented back to the ER today and cxray now revealed increased pulm vascular markings with BNP slightly increased to 549 though troponin levels improved at 19.2. Hagaman Aditi her worsening pulmonary symptoms worsened by her underlying COPD which is now in exacerbation, Hospitalist consulted by ER for inpatient admission. \" Cardiology following. Lasix started but was held over the past couple of days. New diagnosis of cirrhosis. Plan was for paracentesis on 11/3 but not enough fluid to drain. ECHO showing grade 1 diastolic dysfunction with EF 60%    On 11/3, noted to have increased dyspnea. On venti mask. Given ativan. Lasix restarted. Now sleepy. Waiting on ICU bed. K 5.3.    Current Facility-Administered Medications   Medication Dose Route Frequency    furosemide (LASIX) 10 mg/mL injection        methylPREDNISolone (PF) (SOLU-MEDROL) injection 40 mg  40 mg IntraVENous Q8H    nystatin (MYCOSTATIN) 100,000 unit/gram powder   Topical BID    aspirin delayed-release tablet 81 mg  81 mg Oral DAILY    buPROPion SR (WELLBUTRIN SR) tablet 150 mg  150 mg Oral BID    DULoxetine (CYMBALTA) capsule 60 mg  60 mg Oral DAILY    ezetimibe/atorvastatin 10/10 mg   Oral DAILY    [Held by provider] gabapentin (NEURONTIN) capsule 600 mg  600 mg Oral QHS    latanoprost (XALATAN) 0.005 % ophthalmic solution 1 Drop  1 Drop Both Eyes QHS    [Held by provider] furosemide (LASIX) injection 40 mg  40 mg IntraVENous BID    sodium chloride (NS) flush 5-40 mL  5-40 mL IntraVENous Q8H    sodium chloride (NS) flush 5-40 mL  5-40 mL IntraVENous PRN    acetaminophen (TYLENOL) tablet 650 mg  650 mg Oral Q6H PRN    Or    acetaminophen (TYLENOL) suppository 650 mg  650 mg Rectal Q6H PRN    polyethylene glycol (MIRALAX) packet 17 g  17 g Oral DAILY PRN    ondansetron (ZOFRAN ODT) tablet 4 mg  4 mg Oral Q8H PRN    Or    ondansetron (ZOFRAN) injection 4 mg  4 mg IntraVENous Q6H PRN    enoxaparin (LOVENOX) injection 40 mg  40 mg SubCUTAneous Q12H    nicotine (NICODERM CQ) 14 mg/24 hr patch 1 Patch  1 Patch TransDERmal DAILY    pantoprazole (PROTONIX) tablet 40 mg  40 mg Oral ACB    arformoteroL (BROVANA) neb solution 15 mcg  15 mcg Nebulization BID RT    budesonide (PULMICORT) 500 mcg/2 ml nebulizer suspension  500 mcg Nebulization BID RT    albuterol-ipratropium (DUO-NEB) 2.5 MG-0.5 MG/3 ML  3 mL Nebulization Q4H RT    tiotropium bromide (SPIRIVA RESPIMAT) 2.5 mcg /actuation  2 Puff Inhalation DAILY    [Held by provider] losartan (COZAAR) tablet 50 mg  50 mg Oral DAILY    ALPRAZolam (XANAX) tablet 1 mg  1 mg Oral BID PRN    HYDROcodone-acetaminophen (NORCO)  mg tablet 1 Tablet  1 Tablet Oral Q6H PRN       Objective:     Patient Vitals for the past 24 hrs:   Temp Pulse Resp BP SpO2   11/03/21 1208     95 %   11/03/21 1109 97.9 °F (36.6 °C) 90 22 129/68    11/03/21 0849    130/69    11/03/21 0843     99 %   11/03/21 0804 98.3 °F (36.8 °C) 94 22 (!) 147/66    11/03/21 0631 98.3 °F (36.8 °C) (!) 102 22 (!) 144/87    11/03/21 0306 98.2 °F (36.8 °C) 96 22 138/84 96 %   11/02/21 2316     96 %   11/02/21 2305 98.6 °F (37 °C) 98 20 (!) 144/69 96 %   11/02/21 1949     97 %   11/02/21 1935 98.4 °F (36.9 °C) 89 20 123/66 96 %   11/02/21 1455 97.7 °F (36.5 °C) (!) 108 16 129/64 99 %   11/02/21 1441     96 %         Intake and Output:  Current Shift: 11/03 0701 - 11/03 1900  In: -   Out: 1400 [[facility]:7534]  Last three shifts: 11/01 1901 - 11/03 0700  In: 620 [P.O.:620]  Out: 3150 [Urine:3150]    Physical Exam:   General:  Will open eyes to stimuli. On venti mask. Lethargic    Eyes:  Conjunctivae/corneas clear. PERRL   Ears:  Normal TMs and external ear canals both ears. Nose: Nares normal.    Mouth/Throat: Lips, mucosa, and tongue normal.    Neck:  no JVD. Back:   deferred. Lungs:   Diffuse wheezing   Heart:  Regular rate and rhythm, S1, S2 normal   Abdomen:   Soft, non-tender. Morbidly obese   Extremities: 3+ edema with erythema to LE bilaterally    Pulses: 2+ and symmetric all extremities.    Skin: As above    Lymph nodes: Cervical, supraclavicular, and axillary nodes normal.   Neurologic: Very slow to answer questions but will answer appropriately when she will answer         Lab/Data Review:    Recent Results (from the past 24 hour(s))   POTASSIUM    Collection Time: 11/02/21  6:42 PM   Result Value Ref Range    Potassium 5.0 3.5 - 5.1 mmol/L   METABOLIC PANEL, COMPREHENSIVE    Collection Time: 11/03/21  6:29 AM   Result Value Ref Range    Sodium 133 (L) 136 - 145 mmol/L    Potassium 5.3 (H) 3.5 - 5.1 mmol/L    Chloride 96 (L) 98 - 107 mmol/L    CO2 34 (H) 21 - 32 mmol/L    Anion gap 3 (L) 7 - 16 mmol/L    Glucose 122 (H) 65 - 100 mg/dL    BUN 43 (H) 8 - 23 MG/DL    Creatinine 0.98 0.6 - 1.0 MG/DL    GFR est AA >60 >60 ml/min/1.73m2    GFR est non-AA >60 >60 ml/min/1.73m2    Calcium 9.3 8.3 - 10.4 MG/DL    Bilirubin, total 1.0 0.2 - 1.1 MG/DL    ALT (SGPT) 45 12 - 65 U/L    AST (SGOT) 47 (H) 15 - 37 U/L    Alk. phosphatase 98 50 - 136 U/L    Protein, total 6.9 6.3 - 8.2 g/dL    Albumin 3.1 (L) 3.2 - 4.6 g/dL    Globulin 3.8 (H) 2.3 - 3.5 g/dL    A-G Ratio 0.8 (L) 1.2 - 3.5     PROTHROMBIN TIME + INR    Collection Time: 11/03/21  6:29 AM   Result Value Ref Range    Prothrombin time 16.4 (H) 12.6 - 14.5 sec    INR 1.3     CBC WITH AUTOMATED DIFF    Collection Time: 11/03/21  6:29 AM   Result Value Ref Range    WBC 6.0 4.3 - 11.1 K/uL    RBC 2.83 (L) 4.05 - 5.2 M/uL    HGB 8.0 (L) 11.7 - 15.4 g/dL    HCT 26.6 (L) 35.8 - 46.3 %    MCV 94.0 79.6 - 97.8 FL    MCH 28.3 26.1 - 32.9 PG    MCHC 30.1 (L) 31.4 - 35.0 g/dL    RDW 15.7 (H) 11.9 - 14.6 %    PLATELET 677 (L) 659 - 450 K/uL    MPV 9.7 9.4 - 12.3 FL    ABSOLUTE NRBC 0.02 0.0 - 0.2 K/uL    DF AUTOMATED      NEUTROPHILS 90 (H) 43 - 78 %    LYMPHOCYTES 4 (L) 13 - 44 %    MONOCYTES 5 4.0 - 12.0 %    EOSINOPHILS 0 (L) 0.5 - 7.8 %    BASOPHILS 0 0.0 - 2.0 %    IMMATURE GRANULOCYTES 1 0.0 - 5.0 %    ABS. NEUTROPHILS 5.4 1.7 - 8.2 K/UL    ABS. LYMPHOCYTES 0.2 (L) 0.5 - 4.6 K/UL    ABS. MONOCYTES 0.3 0.1 - 1.3 K/UL    ABS. EOSINOPHILS 0.0 0.0 - 0.8 K/UL    ABS. BASOPHILS 0.0 0.0 - 0.2 K/UL    ABS. IMM.  GRANS. 0.1 0.0 - 0.5 K/UL   GLUCOSE, POC    Collection Time: 11/03/21  8:32 AM   Result Value Ref Range    Glucose (POC) 135 (H) 65 - 100 mg/dL    Performed by ReynaNicole        Assessment/ Plan:     Principal Problem:    Acute diastolic CHF (congestive heart failure) ([facility] (10/30/2021)    Active Problems:    GERD (gastroesophageal reflux disease) ()      Overview: EGD October 2016 showed an irregular Z line, and mild diffuse gastritis       and there are biopsy reports under pathology--Dr. Stefanie Modi      Hyperlipidemia ()      Fibromyalgia ()      Anxiety ()      Essential hypertension, benign ()      Morbid obesity ([facility] () COPD (chronic obstructive pulmonary disease) (HCC) ()      Acute exacerbation of CHF (congestive heart failure) ([facility] (10/29/2021)      Chronic hypoxemic respiratory failure ([address] (10/29/2021)      Anasarca (10/29/2021)      Cirrhosis ([address] (11/1/2021)      Hyperkalemia (11/2/2021)    Acute diastolic CHF (congestive heart failure) ([address] (10/30/2021)  Restart lasix 40mg BID. Monitor kidney function    Mild hyperkalemia  S/p Lokelma. Lasix restarted, check K this evening     Hypertension  Losartan has been held secondary to mild decrease in kidney function after Lasix. Kidney functions improving.         COPD exacerbation (chronic obstructive pulmonary disease) (HCC) ()  We will decrease Solu-Medrol to 40 every 8hr  Continue nebs  Has bilateral wheezing       Chronic hypoxemic respiratory failure ([facility] (10/29/2021)  On 4 L/min at home       Anasarca (10/29/2021)  secondary to CHF and cirrhosis, worse today so will restart lasix 40mg BID. Spironolactone also added     Morbid obesity  BMI 46.85     Anxiety/depression  Continue Wellbutrin, Cymbalta     Hyperlipidemia  Continue home medication    Cirrhosis   GI following    Transfer to ICU. Next 24 hours critical. Albumin almost normal so will not supplement. Insert NG tube since lethargic today so that can still receive medications. Order chest x ray after NG tube inserted to ensure proper placement. Order Zosyn incase of aspiration. UA from 10/30 with possible early UTI, so want to see if this will help with her mentation. Order urine culture. I have personally seen this patient 4 times today and have talked with the daughters 3 times today.      DVT prophylaxis - Lovenox  Signed By: Iris Boykin DO     November 3, 2021

## 2021-11-03 NOTE — PROGRESS NOTES
[facility] CARDIOLOGY PROGRESS NOTE           11/2/2021 6:43 PM    Admit Date: 10/29/2021      Subjective:     No overnight events. On 4 L nasal cannula chronically. Reports prior history of fatty liver. Plans for paracentesis in a.m.    ROS:  Cardiovascular:  As noted above    Objective:      Vitals:    11/02/21 1935 11/02/21 1949 11/02/21 2305 11/02/21 2316   BP: 123/66  (!) 144/69    Pulse: 89  98    Resp: 20  20    Temp: 98.4 °F (36.9 °C)  98.6 °F (37 °C)    SpO2: 96% 97% 96% 96%   Weight: 269 lb 9.6 oz (122.3 kg)      Height:           Physical Exam:  General-No Acute Distress  Neck- supple, no JVD  CV- regular rate and rhythm no MRG  Lung- clear bilaterally  Abd- soft, nontender, +distended  Ext- 2-3+ edema bilaterally. Skin- warm and dry    Data Review:   Recent Labs     11/02/21  1842 11/02/21  0634 11/01/21  0631 11/01/21  0631   NA  --  133*  --  131*   K 5.0 5.4*   < > 5.1   BUN  --  40*  --  37*   CREA  --  0.99  --  1.22*   GLU  --  111*  --  133*   WBC  --   --   --  7.0   HGB  --   --   --  8.9*   HCT  --   --   --  29.6*   PLT  --   --   --  159   INR  --  1.2  --   --     < > = values in this interval not displayed. Assessment/Plan:     Principal Problem:    Acute diastolic CHF (congestive heart failure) ([facility] (10/30/2021)  -Likely predominant component of cirrhosis contributing to presentation.  -Echo with preserved EF and normal RV size and fn (will review images but initial echo read consistent with no significant elevation in left atrial pressures). -Furthermore, worsening renal function/hyponatremia with attempted diuresis. No significant diuresis as well  -Consideration for adding on Aldactone/maintenance diuretic as tolerated (consider po bumex); assess in AM. Appears received some IV Lasix and albumin earlier today. Also some mild hyperkalemia. -Defer work-up of cirrhosis to GI.     Active Problems:    GERD (gastroesophageal reflux disease) () Hyperlipidemia ()      Fibromyalgia ()      Anxiety ()      Essential hypertension, benign ()      Morbid obesity (HCC) ()      COPD (chronic obstructive pulmonary disease) (HCC) ()  -on chronic 4L NC      Chronic hypoxemic respiratory failure ([facility] (10/29/2021)      Anasarca (10/29/2021)  -secondary to cirrhosis       Cirrhosis ([facility] (11/1/2021)  -Appears secondary to TANG/obesity    Kal Mejía MD  11/2/2021 6:43 PM

## 2021-11-03 NOTE — PROGRESS NOTES
[facility] CARDIOLOGY PROGRESS NOTE           11/3/2021 5:06 PM    Admit Date: 10/29/2021      Subjective:   She feels that her breathing is worse today. On Ventimask to be able to maintain oxygen. At times on a nonrebreather. Oxygen saturations on room air when she took off the mask was down to 83%. Family was at bedside and I spoke to them for over 40 minutes in total including one of the daughters over the phone. She denies any chest pain. They tried paracentesis today but were unsuccessful. ROS:  Cardiovascular:  As noted above    Objective:      Vitals:    11/03/21 1109 11/03/21 1208 11/03/21 1633 11/03/21 1641   BP: 129/68  129/75    Pulse: 90  (!) 101    Resp: 22  22    Temp: 97.9 °F (36.6 °C)  98.4 °F (36.9 °C)    SpO2:  95% 91% 94%   Weight:       Height:           Physical Exam:  General-alert, oriented x3, dyspneic and sitting up by her bedside  Neck- supple, moderate JVD  CV- regular rate and rhythm no MRG  Lung-scattered rales and wheezes noted with rales especially in the bases bilaterally and diminished breath sounds. Abd- soft, mildly distended, obese, shifting dullness noted in the flanks. Ext-bilateral 2+ to 3+ pitting ankle edema noted extending up to both knees and above. Skin- warm and dry    Data Review:   Recent Labs     11/03/21  0629 11/02/21  1842 11/02/21  0634 11/02/21  0634 11/01/21  0631 11/01/21  0631   *  --   --  133*   < > 131*   K 5.3* 5.0   < > 5.4*   < > 5.1   BUN 43*  --   --  40*   < > 37*   CREA 0.98  --   --  0.99   < > 1.22*   *  --   --  111*   < > 133*   WBC 6.0  --   --   --   --  7.0   HGB 8.0*  --   --   --   --  8.9*   HCT 26.6*  --   --   --   --  29.6*   *  --   --   --   --  159   INR 1.3  --   --  1.2  --   --     < > = values in this interval not displayed.        Assessment/Plan:     Principal Problem:    Acute diastolic CHF (congestive heart failure) ([facility] (10/30/2021)  -Likely predominant component of cirrhosis contributing to presentation.  -Echo with preserved EF and normal RV size and fn there is concern for diastolic dysfunction but overall, has preserved LV systolic function. -  -Previous renal dysfunction noted with diuresis but at this point, with fluid overload, diuretic therapy would be in her best interest.    -Defer work-up of cirrhosis to GI.     Active Problems:    GERD (gastroesophageal reflux disease) ()       Hyperlipidemia ()       Fibromyalgia ()       Anxiety ()       Essential hypertension, benign ()   -Reasonably controlled at this point. Morbid obesity (HCC) ()       COPD (chronic obstructive pulmonary disease) (HCC) ()  -on chronic 4L NC       Chronic hypoxemic respiratory failure ([facility] (10/29/2021)  -Likely multifactorial with obesity/hypoventilation/suspected underlying sleep apnea/diastolic dysfunction/third spacing and fluid retention from cirrhosis.       Anasarca (10/29/2021)  -secondary to cirrhosis        Cirrhosis ([facility] (11/1/2021)  -Appears secondary to TANG/obesity  -Would need GI input      Hyperkalemia (11/2/2021)  - prevents the use of aldactone    In summary, as I explained to her, she is between a rock and a hard place.   -I personally reviewed her echo and she has preserved LV systolic function. She might have some diastolic dysfunction but certainly not severe enough to cause all her fluid retention which is multifactorial. We need to diurese her but this can aggravate renal dysfunction. I switched her Lasix to Bumex considering that her albumin levels are low. I spent over 60 minutes in total agreement with the chart in detail with the family at bedside and face-to-face with the patient for over 40 minutes. -Depending on how she responds to IV Bumex especially with her Clayton catheter in place, further decisions can be made. Spironolactone could not be started with us being unable to recheck a BMP this evening.  I explained to her that she might need a PICC line to be able to draw labs which she was not in favor of this. -A BMP tomorrow morning shows improving potassium levels, please initiate spironolactone.  GI input would be beneficial.      Hanna Garcia MD  11/3/2021 5:06 PM

## 2021-11-03 NOTE — PROGRESS NOTES
[facility] Progress Note         Admit Date:  10/29/2021    Today's Date:  11/3/2021    CC:  Cirrhosis     Subjective:     Patient now on 9 liters of O2 with O2 sats around 93%. Family called in to bedside and pt to be moved to ICU. She is DNR. Unable to get para today.       Medications:   Current Facility-Administered Medications   Medication Dose Route Frequency    furosemide (LASIX) 10 mg/mL injection        methylPREDNISolone (PF) (SOLU-MEDROL) injection 40 mg  40 mg IntraVENous Q8H    nystatin (MYCOSTATIN) 100,000 unit/gram powder   Topical BID    aspirin delayed-release tablet 81 mg  81 mg Oral DAILY    buPROPion SR (WELLBUTRIN SR) tablet 150 mg  150 mg Oral BID    DULoxetine (CYMBALTA) capsule 60 mg  60 mg Oral DAILY    ezetimibe/atorvastatin 10/10 mg   Oral DAILY    [Held by provider] gabapentin (NEURONTIN) capsule 600 mg  600 mg Oral QHS    latanoprost (XALATAN) 0.005 % ophthalmic solution 1 Drop  1 Drop Both Eyes QHS    furosemide (LASIX) injection 40 mg  40 mg IntraVENous BID    sodium chloride (NS) flush 5-40 mL  5-40 mL IntraVENous Q8H    sodium chloride (NS) flush 5-40 mL  5-40 mL IntraVENous PRN    acetaminophen (TYLENOL) tablet 650 mg  650 mg Oral Q6H PRN    Or    acetaminophen (TYLENOL) suppository 650 mg  650 mg Rectal Q6H PRN    polyethylene glycol (MIRALAX) packet 17 g  17 g Oral DAILY PRN    ondansetron (ZOFRAN ODT) tablet 4 mg  4 mg Oral Q8H PRN    Or    ondansetron (ZOFRAN) injection 4 mg  4 mg IntraVENous Q6H PRN    enoxaparin (LOVENOX) injection 40 mg  40 mg SubCUTAneous Q12H    nicotine (NICODERM CQ) 14 mg/24 hr patch 1 Patch  1 Patch TransDERmal DAILY    pantoprazole (PROTONIX) tablet 40 mg  40 mg Oral ACB    arformoteroL (BROVANA) neb solution 15 mcg  15 mcg Nebulization BID RT    budesonide (PULMICORT) 500 mcg/2 ml nebulizer suspension  500 mcg Nebulization BID RT    albuterol-ipratropium (DUO-NEB) 2.5 MG-0.5 MG/3 ML  3 mL Nebulization Q4H RT  tiotropium bromide (SPIRIVA RESPIMAT) 2.5 mcg /actuation  2 Puff Inhalation DAILY    [Held by provider] losartan (COZAAR) tablet 50 mg  50 mg Oral DAILY    ALPRAZolam (XANAX) tablet 1 mg  1 mg Oral BID PRN    HYDROcodone-acetaminophen (NORCO)  mg tablet 1 Tablet  1 Tablet Oral Q6H PRN       Review of Systems:  Unable to obtain    Diet:  Low Na    Objective:   Vitals:  Visit Vitals  /68 (BP 1 Location: Left arm)   Pulse 90   Temp 97.9 °F (36.6 °C)   Resp 22   Ht 5' 3\" (1.6 m)   Wt 122.3 kg (269 lb 9.6 oz)   SpO2 95%   BMI 47.76 kg/m²     Intake/Output:  11/03 0701 - 11/03 1900  In: -   Out: 1400 [TTJPI:2697]  11/01 1901 - 11/03 0700  In: 620 [P.O.:620]  Out: 3150 [Urine:3150]  Exam:  General appearance: sleepy on high flow O2  Lungs: course auscultation bilaterally anteriorly  Heart: regular rate and rhythm  Abdomen: distedned, non-tender.  Hypoactive bowel sounds normal. No masses, no organomegaly  Extremities: +3 edema, redness bilat  Neuro:  Sleepy     Data Review (Labs):    Recent Labs     11/03/21  0629 11/02/21  1842 11/02/21  0634 11/01/21  0631   WBC 6.0  --   --  7.0   HGB 8.0*  --   --  8.9*   HCT 26.6*  --   --  29.6*   *  --   --  159   MCV 94.0  --   --  95.2   *  --  133* 131*   K 5.3* 5.0 5.4* 5.1   CL 96*  --  98 95*   CO2 34*  --  30 34*   BUN 43*  --  40* 37*   CREA 0.98  --  0.99 1.22*   CA 9.3  --  8.8 9.2   *  --  111* 133*   AP 98  --  98 114   AST 47*  --  51* 45*   ALT 45  --  43 39   TBILI 1.0  --  0.8 0.6   ALB 3.1*  --  2.7* 3.0*   TP 6.9  --  6.8 7.5   PTP 16.4*  --  15.5*  --    INR 1.3  --  1.2  --         Colonoscopy by Dr. Yared Kaiser on 10/13/2016 revealed [address] [facility] [address], tubular adenoma in the transverse and sigmoid colon.      EGD on 10/13/2016 by Dr. Yared Kaiser revealed gastritis, irregular Z line, but negative pathology for Dave's esophagus     EXAM: US ABD COMP 10/31/2021  FINDINGS:   This examination was technically difficult secondary to the patient's large body  habitus and anasarca. Aorta/IVC: Visualized portions are within normal limits. Pancreas: Visualized portions are within normal limits. Liver: Shows a coarsened nodular echotexture. No discrete hepatic masses were  noted. Gallbladder: The patient is status post cholecystectomy. Sonographic Villagomez sign  was negative. Portal vein: The portal vein shows hepatopedal flow. CBD: Normal in caliber and measures 5.2 mm. Spleen: The spleen measures 10.8  cm. There are no discrete masses. Right kidney: 10.8 cm in length and without evidence of obstruction. Left kidney: 11.4 cm in length and without evidence of obstruction. IMPRESSION  The liver shows a coarsened, nodular echotexture. This appearance is consistent  with cirrhosis. The patient is status post cholecystectomy. Visualization of the pancreas was limited. Visualized portions showed no obvious  masses. Other findings as above.        EXAM: CT Chest with IV contrast - PE protocol. 7/23/2021  FINDINGS:  - Pleura/pericardium: Within normal limits. - Lungs: Mild areas of atelectasis or scarring are seen in both lung bases. A  few tiny bilateral lung nodules are unchanged. No acute infiltrate is seen. - Shayna/Mediastinum: Within normal limits. - Tracheobronchial tree: Within normal limits. - Aorta/pulmonary arteries: Within normal limits. - Heart: Within normal limits. - Coronary arteries: No coronary artery calcifications. - Chest wall: Within normal limits. - Spine/bones: There are nondisplaced acute or subacute fractures in the  anterolateral right fifth, sixth and seventh ribs. - Additional comments: Scans to the upper abdomen demonstrate a cirrhotic liver,  ascites and mild splenomegaly. IMPRESSION  1. No acute infiltrate or evidence of pulmonary embolism. 2. Acute or subacute fractures in the anterolateral right 5-7th ribs.   3. Cirrhotic liver, with ascites and splenomegaly suggestive of portal  hypertension. Assessment:     Principal Problem:    Acute diastolic CHF (congestive heart failure) ([address] (10/30/2021)    Active Problems:    GERD (gastroesophageal reflux disease) ()      Overview: EGD October 2016 showed an irregular Z line, and mild diffuse gastritis       and there are biopsy reports under pathology--Dr. Mohinder Anthony      Hyperlipidemia ()      Fibromyalgia ()      Anxiety ()      Essential hypertension, benign ()      Morbid obesity (HCC) ()      COPD (chronic obstructive pulmonary disease) (HCC) ()      Acute exacerbation of CHF (congestive heart failure) ([address] (10/29/2021)      Chronic hypoxemic respiratory failure ([address] (10/29/2021)      Anasarca (10/29/2021)      Cirrhosis ([address] (11/1/2021)      Hyperkalemia (11/2/2021)      77 y.o. female with PMH including but not limited to, COPD with chronic resp failure on 4L NC, HTN, chronic back pain, CHF (EF 60-65% on 10/29/2021),HLD, GERD, Depression, Fibromyalgia and metabolic syndrome who is seen in consultation at the request of Dr. Delma Horne for cirrhosis. She was admitted on 10/29/2021 for exacerbation of her COPD with gross anasarca. Cardiology is following who recommended GI evaluation for cirrhosis. She has associated anasarca, thrombocytopenia and portal hypertension on recent imaging. Hepatitis panel pending. MELD Na on 10/29/2021 is 7. Has had decompensation of pulmonary status and family called to BS. Is to transfer to ICU. Pt DNR and declines intubation.       Plan:     -Cirrhosis likely secondary to TANG with HLD and obesity, but hepatic congestion also likely playing a role with known CHF  -Agree with diuresis, on Lasix 40 mg IB BID, will add Aldactone 50 m daily   -Diagnostic paracentesis could not safely be performed to calculate SAAG score (mot enough fluid and bowel in the way)  -Agree with 2gm sodium diet  -AFP not elevated at 2.4  -Viral hepatitis panel negative    Rocío Edouard NP  Patient is seen and examined in collaboration with Dr. Anita Metz. Assessment and plan as per Dr. Crescencio Blackman.

## 2021-11-03 NOTE — PROGRESS NOTES
PT note:    Pt on hold today due to decline in medical status and pt transferring to ICU. Will check back on pt tomorrow.     Ricci Mcburney, PTA

## 2021-11-03 NOTE — PROGRESS NOTES
Massiel Louie PA-C in to evaluate pt for paracentesis. Pt has a small pocket of fluid on the LLQ that has a loop of bowel fluctuating against the abdominal wall. Pocket is not large enough to safely complete the procedure. Report called to MATTEO Stanton. [facility] notified to transport pt back to [facility].

## 2021-11-03 NOTE — PROGRESS NOTES
While rounding patient found to be  in respiratory distress. Charge nurse notified. Rapid response notified. Dr and respiratory at bedside. New orders received.

## 2021-11-03 NOTE — PROGRESS NOTES
Orders to place NG tube. Patient has refused after several attempts. Patient educated on the purpose of the NG but refuses. MD notified. Will continue to monitor.

## 2021-11-04 ENCOUNTER — APPOINTMENT (OUTPATIENT)
Dept: GENERAL RADIOLOGY | Age: 66
DRG: 291 | End: 2021-11-04
Attending: FAMILY MEDICINE
Payer: MEDICARE

## 2021-11-04 LAB
ALBUMIN SERPL-MCNC: 3.1 G/DL (ref 3.2–4.6)
ALBUMIN/GLOB SERPL: 0.9 {RATIO} (ref 1.2–3.5)
ALP SERPL-CCNC: 92 U/L (ref 50–136)
ALT SERPL-CCNC: 40 U/L (ref 12–65)
ANION GAP SERPL CALC-SCNC: 0 MMOL/L (ref 7–16)
AST SERPL-CCNC: 31 U/L (ref 15–37)
BILIRUB SERPL-MCNC: 0.9 MG/DL (ref 0.2–1.1)
BUN SERPL-MCNC: 38 MG/DL (ref 8–23)
CALCIUM SERPL-MCNC: 9 MG/DL (ref 8.3–10.4)
CHLORIDE SERPL-SCNC: 97 MMOL/L (ref 98–107)
CO2 SERPL-SCNC: 39 MMOL/L (ref 21–32)
CREAT SERPL-MCNC: 0.97 MG/DL (ref 0.6–1)
GLOBULIN SER CALC-MCNC: 3.5 G/DL (ref 2.3–3.5)
GLUCOSE SERPL-MCNC: 114 MG/DL (ref 65–100)
INR PPP: 1.3
POTASSIUM SERPL-SCNC: 4.6 MMOL/L (ref 3.5–5.1)
PROT SERPL-MCNC: 6.6 G/DL (ref 6.3–8.2)
PROTHROMBIN TIME: 16.8 SEC (ref 12.6–14.5)
SODIUM SERPL-SCNC: 136 MMOL/L (ref 136–145)

## 2021-11-04 PROCEDURE — 74011000250 HC RX REV CODE- 250: Performed by: INTERNAL MEDICINE

## 2021-11-04 PROCEDURE — 74011250636 HC RX REV CODE- 250/636: Performed by: FAMILY MEDICINE

## 2021-11-04 PROCEDURE — 74011250637 HC RX REV CODE- 250/637: Performed by: PHYSICIAN ASSISTANT

## 2021-11-04 PROCEDURE — 74011250636 HC RX REV CODE- 250/636: Performed by: PHYSICIAN ASSISTANT

## 2021-11-04 PROCEDURE — 97530 THERAPEUTIC ACTIVITIES: CPT

## 2021-11-04 PROCEDURE — 74011000258 HC RX REV CODE- 258: Performed by: FAMILY MEDICINE

## 2021-11-04 PROCEDURE — 94640 AIRWAY INHALATION TREATMENT: CPT

## 2021-11-04 PROCEDURE — 71045 X-RAY EXAM CHEST 1 VIEW: CPT

## 2021-11-04 PROCEDURE — 36415 COLL VENOUS BLD VENIPUNCTURE: CPT

## 2021-11-04 PROCEDURE — 2709999900 HC NON-CHARGEABLE SUPPLY

## 2021-11-04 PROCEDURE — 97110 THERAPEUTIC EXERCISES: CPT

## 2021-11-04 PROCEDURE — 77030012794 HC KT NSL CANN/HGH TRAN -A

## 2021-11-04 PROCEDURE — 74011250637 HC RX REV CODE- 250/637: Performed by: FAMILY MEDICINE

## 2021-11-04 PROCEDURE — 85610 PROTHROMBIN TIME: CPT

## 2021-11-04 PROCEDURE — 94760 N-INVAS EAR/PLS OXIMETRY 1: CPT

## 2021-11-04 PROCEDURE — 74011250637 HC RX REV CODE- 250/637: Performed by: INTERNAL MEDICINE

## 2021-11-04 PROCEDURE — 99232 SBSQ HOSP IP/OBS MODERATE 35: CPT | Performed by: INTERNAL MEDICINE

## 2021-11-04 PROCEDURE — 77010033678 HC OXYGEN DAILY

## 2021-11-04 PROCEDURE — 74011000250 HC RX REV CODE- 250: Performed by: PHYSICIAN ASSISTANT

## 2021-11-04 PROCEDURE — 80053 COMPREHEN METABOLIC PANEL: CPT

## 2021-11-04 PROCEDURE — 65610000001 HC ROOM ICU GENERAL

## 2021-11-04 RX ORDER — ALPRAZOLAM 0.5 MG/1
1 TABLET ORAL
Status: DISCONTINUED | OUTPATIENT
Start: 2021-11-04 | End: 2021-11-07 | Stop reason: HOSPADM

## 2021-11-04 RX ADMIN — IPRATROPIUM BROMIDE AND ALBUTEROL SULFATE 3 ML: .5; 3 SOLUTION RESPIRATORY (INHALATION) at 11:35

## 2021-11-04 RX ADMIN — BUDESONIDE 500 MCG: 0.5 INHALANT RESPIRATORY (INHALATION) at 07:29

## 2021-11-04 RX ADMIN — ALPRAZOLAM 1 MG: 0.5 TABLET ORAL at 21:32

## 2021-11-04 RX ADMIN — ALPRAZOLAM 1 MG: 0.5 TABLET ORAL at 15:16

## 2021-11-04 RX ADMIN — Medication 10 ML: at 15:06

## 2021-11-04 RX ADMIN — METHYLPREDNISOLONE SODIUM SUCCINATE 40 MG: 40 INJECTION, POWDER, FOR SOLUTION INTRAMUSCULAR; INTRAVENOUS at 07:54

## 2021-11-04 RX ADMIN — BUDESONIDE 500 MCG: 0.5 INHALANT RESPIRATORY (INHALATION) at 19:33

## 2021-11-04 RX ADMIN — METHYLPREDNISOLONE SODIUM SUCCINATE 40 MG: 40 INJECTION, POWDER, FOR SOLUTION INTRAMUSCULAR; INTRAVENOUS at 00:34

## 2021-11-04 RX ADMIN — ATORVASTATIN CALCIUM: 10 TABLET, FILM COATED ORAL at 09:59

## 2021-11-04 RX ADMIN — IPRATROPIUM BROMIDE AND ALBUTEROL SULFATE 3 ML: .5; 3 SOLUTION RESPIRATORY (INHALATION) at 07:29

## 2021-11-04 RX ADMIN — ARFORMOTEROL TARTRATE 15 MCG: 15 SOLUTION RESPIRATORY (INHALATION) at 07:29

## 2021-11-04 RX ADMIN — HYDROCODONE BITARTRATE AND ACETAMINOPHEN 1 TABLET: 10; 325 TABLET ORAL at 21:33

## 2021-11-04 RX ADMIN — ALPRAZOLAM 1 MG: 0.5 TABLET ORAL at 07:52

## 2021-11-04 RX ADMIN — NYSTATIN: 100000 POWDER TOPICAL at 09:00

## 2021-11-04 RX ADMIN — Medication 81 MG: at 10:00

## 2021-11-04 RX ADMIN — SPIRONOLACTONE 50 MG: 25 TABLET ORAL at 09:00

## 2021-11-04 RX ADMIN — PANTOPRAZOLE SODIUM 40 MG: 40 TABLET, DELAYED RELEASE ORAL at 07:53

## 2021-11-04 RX ADMIN — BUMETANIDE 1 MG: 0.25 INJECTION INTRAMUSCULAR; INTRAVENOUS at 09:59

## 2021-11-04 RX ADMIN — BUPROPION HYDROCHLORIDE 150 MG: 150 TABLET, EXTENDED RELEASE ORAL at 10:01

## 2021-11-04 RX ADMIN — PIPERACILLIN AND TAZOBACTAM 4.5 G: 4; .5 INJECTION, POWDER, FOR SOLUTION INTRAVENOUS at 08:00

## 2021-11-04 RX ADMIN — PIPERACILLIN AND TAZOBACTAM 4.5 G: 4; .5 INJECTION, POWDER, FOR SOLUTION INTRAVENOUS at 00:00

## 2021-11-04 RX ADMIN — ARFORMOTEROL TARTRATE 15 MCG: 15 SOLUTION RESPIRATORY (INHALATION) at 19:33

## 2021-11-04 RX ADMIN — IPRATROPIUM BROMIDE AND ALBUTEROL SULFATE 3 ML: .5; 3 SOLUTION RESPIRATORY (INHALATION) at 19:33

## 2021-11-04 RX ADMIN — ENOXAPARIN SODIUM 40 MG: 100 INJECTION SUBCUTANEOUS at 09:59

## 2021-11-04 RX ADMIN — Medication 10 ML: at 21:31

## 2021-11-04 RX ADMIN — METHYLPREDNISOLONE SODIUM SUCCINATE 40 MG: 40 INJECTION, POWDER, FOR SOLUTION INTRAMUSCULAR; INTRAVENOUS at 15:04

## 2021-11-04 RX ADMIN — Medication 10 ML: at 06:00

## 2021-11-04 RX ADMIN — BUMETANIDE 1 MG: 0.25 INJECTION INTRAMUSCULAR; INTRAVENOUS at 17:10

## 2021-11-04 RX ADMIN — BUPROPION HYDROCHLORIDE 150 MG: 150 TABLET, EXTENDED RELEASE ORAL at 17:10

## 2021-11-04 RX ADMIN — ENOXAPARIN SODIUM 40 MG: 100 INJECTION SUBCUTANEOUS at 21:30

## 2021-11-04 RX ADMIN — IPRATROPIUM BROMIDE AND ALBUTEROL SULFATE 3 ML: .5; 3 SOLUTION RESPIRATORY (INHALATION) at 04:07

## 2021-11-04 RX ADMIN — DULOXETINE HYDROCHLORIDE 60 MG: 60 CAPSULE, DELAYED RELEASE ORAL at 10:00

## 2021-11-04 RX ADMIN — IPRATROPIUM BROMIDE AND ALBUTEROL SULFATE 3 ML: .5; 3 SOLUTION RESPIRATORY (INHALATION) at 15:54

## 2021-11-04 RX ADMIN — HYDROCODONE BITARTRATE AND ACETAMINOPHEN 1 TABLET: 10; 325 TABLET ORAL at 06:02

## 2021-11-04 RX ADMIN — LATANOPROST 1 DROP: 50 SOLUTION OPHTHALMIC at 21:30

## 2021-11-04 RX ADMIN — NYSTATIN: 100000 POWDER TOPICAL at 17:10

## 2021-11-04 RX ADMIN — TIOTROPIUM BROMIDE INHALATION SPRAY 2 PUFF: 3.12 SPRAY, METERED RESPIRATORY (INHALATION) at 07:30

## 2021-11-04 NOTE — PROGRESS NOTES
[facility] Progress Note         Admit Date:  10/29/2021    Today's Date:  11/4/2021    CC:  Cirrhosis     Subjective:     Patient transferred to ICU dt increased effort breathing and AMS.   Para could not be performed yesterday dt not enough fluid    Medications:   Current Facility-Administered Medications   Medication Dose Route Frequency    ALPRAZolam (XANAX) tablet 1 mg  1 mg Oral TID PRN    spironolactone (ALDACTONE) tablet 50 mg  50 mg Oral DAILY    piperacillin-tazobactam (ZOSYN) 4.5 g in 0.9% sodium chloride (MBP/ADV) 100 mL MBP  4.5 g IntraVENous Q8H    bumetanide (BUMEX) injection 1 mg  1 mg IntraVENous BID    methylPREDNISolone (PF) (SOLU-MEDROL) injection 40 mg  40 mg IntraVENous Q8H    nystatin (MYCOSTATIN) 100,000 unit/gram powder   Topical BID    aspirin delayed-release tablet 81 mg  81 mg Oral DAILY    buPROPion SR (WELLBUTRIN SR) tablet 150 mg  150 mg Oral BID    DULoxetine (CYMBALTA) capsule 60 mg  60 mg Oral DAILY    ezetimibe/atorvastatin 10/10 mg   Oral DAILY    [Held by provider] gabapentin (NEURONTIN) capsule 600 mg  600 mg Oral QHS    latanoprost (XALATAN) 0.005 % ophthalmic solution 1 Drop  1 Drop Both Eyes QHS    sodium chloride (NS) flush 5-40 mL  5-40 mL IntraVENous Q8H    sodium chloride (NS) flush 5-40 mL  5-40 mL IntraVENous PRN    acetaminophen (TYLENOL) tablet 650 mg  650 mg Oral Q6H PRN    Or    acetaminophen (TYLENOL) suppository 650 mg  650 mg Rectal Q6H PRN    polyethylene glycol (MIRALAX) packet 17 g  17 g Oral DAILY PRN    ondansetron (ZOFRAN ODT) tablet 4 mg  4 mg Oral Q8H PRN    Or    ondansetron (ZOFRAN) injection 4 mg  4 mg IntraVENous Q6H PRN    enoxaparin (LOVENOX) injection 40 mg  40 mg SubCUTAneous Q12H    nicotine (NICODERM CQ) 14 mg/24 hr patch 1 Patch  1 Patch TransDERmal DAILY    pantoprazole (PROTONIX) tablet 40 mg  40 mg Oral ACB    arformoteroL (BROVANA) neb solution 15 mcg  15 mcg Nebulization BID RT    budesonide (PULMICORT) 500 mcg/2 ml nebulizer suspension  500 mcg Nebulization BID RT    albuterol-ipratropium (DUO-NEB) 2.5 MG-0.5 MG/3 ML  3 mL Nebulization Q4H RT    tiotropium bromide (SPIRIVA RESPIMAT) 2.5 mcg /actuation  2 Puff Inhalation DAILY    [Held by provider] losartan (COZAAR) tablet 50 mg  50 mg Oral DAILY    HYDROcodone-acetaminophen (NORCO)  mg tablet 1 Tablet  1 Tablet Oral Q6H PRN       Review of Systems:  Unable to obtain    Diet:  Low Na    Objective:   Vitals:  Visit Vitals  /63 (BP 1 Location: Left upper arm, BP Patient Position: At rest)   Pulse 98   Temp 98.3 °F (36.8 °C)   Resp 21   Ht 5' 3\" (1.6 m)   Wt 122.4 kg (269 lb 12.8 oz)   SpO2 95%   BMI 47.79 kg/m²     Intake/Output:  No intake/output data recorded. 11/02 1901 - 11/04 0700  In: -   Out: 4500 [Urine:4500]  Exam:  General appearance: sleepy on high flow O2  Lungs: course auscultation bilaterally anteriorly  Heart: regular rate and rhythm  Abdomen: distedned, non-tender.  Hypoactive bowel sounds normal. No masses, no organomegaly  Extremities: +3 edema, redness bilat  Neuro:  Sleepy     Data Review (Labs):    Recent Labs     11/04/21  0302 11/03/21  1706 11/03/21  0629 11/02/21  1842 11/02/21  0634   WBC  --   --  6.0  --   --    HGB  --   --  8.0*  --   --    HCT  --   --  26.6*  --   --    PLT  --   --  134*  --   --    MCV  --   --  94.0  --   --      --  133*  --  133*   K 4.6 4.8 5.3* 5.0 5.4*   CL 97*  --  96*  --  98   CO2 39*  --  34*  --  30   BUN 38*  --  43*  --  40*   CREA 0.97  --  0.98  --  0.99   CA 9.0  --  9.3  --  8.8   *  --  122*  --  111*   AP 92  --  98  --  98   AST 31  --  47*  --  51*   ALT 40  --  45  --  43   TBILI 0.9  --  1.0  --  0.8   ALB 3.1*  --  3.1*  --  2.7*   TP 6.6  --  6.9  --  6.8   PTP 16.8*  --  16.4*  --  15.5*   INR 1.3  --  1.3  --  1.2        Colonoscopy by Dr. Jovita Kelly on 10/13/2016 revealed IH, tubular adenoma in the transverse and sigmoid colon.      EGD on 10/13/2016 by Dr. Elaine Sotomayor revealed gastritis, irregular Z line, but negative pathology for Dave's esophagus     EXAM: US ABD COMP 10/31/2021  FINDINGS:   This examination was technically difficult secondary to the patient's large body  habitus and anasarca. Aorta/IVC: Visualized portions are within normal limits. Pancreas: Visualized portions are within normal limits. Liver: Shows a coarsened nodular echotexture. No discrete hepatic masses were  noted. Gallbladder: The patient is status post cholecystectomy. Sonographic Villagomez sign  was negative. Portal vein: The portal vein shows hepatopedal flow. CBD: Normal in caliber and measures 5.2 mm. Spleen: The spleen measures 10.8  cm. There are no discrete masses. Right kidney: 10.8 cm in length and without evidence of obstruction. Left kidney: 11.4 cm in length and without evidence of obstruction. IMPRESSION  The liver shows a coarsened, nodular echotexture. This appearance is consistent  with cirrhosis. The patient is status post cholecystectomy. Visualization of the pancreas was limited. Visualized portions showed no obvious  masses. Other findings as above.        EXAM: CT Chest with IV contrast - PE protocol. 7/23/2021  FINDINGS:  - Pleura/pericardium: Within normal limits. - Lungs: Mild areas of atelectasis or scarring are seen in both lung bases. A  few tiny bilateral lung nodules are unchanged. No acute infiltrate is seen. - Shayna/Mediastinum: Within normal limits. - Tracheobronchial tree: Within normal limits. - Aorta/pulmonary arteries: Within normal limits. - Heart: Within normal limits. - Coronary arteries: No coronary artery calcifications. - Chest wall: Within normal limits. - Spine/bones: There are nondisplaced acute or subacute fractures in the  anterolateral right fifth, sixth and seventh ribs. - Additional comments: Scans to the upper abdomen demonstrate a cirrhotic liver,  ascites and mild splenomegaly. IMPRESSION  1.  No acute infiltrate or evidence of pulmonary embolism. 2. Acute or subacute fractures in the anterolateral right 5-7th ribs. 3. Cirrhotic liver, with ascites and splenomegaly suggestive of portal  hypertension. Assessment:     Principal Problem:    Acute diastolic CHF (congestive heart failure) ([address] (10/30/2021)    Active Problems:    GERD (gastroesophageal reflux disease) ()      Overview: EGD October 2016 showed an irregular Z line, and mild diffuse gastritis       and there are biopsy reports under pathology--Dr. Mohinder Anthony      Hyperlipidemia ()      Fibromyalgia ()      Anxiety ()      Essential hypertension, benign ()      Morbid obesity (HCC) ()      COPD (chronic obstructive pulmonary disease) (HCC) ()      Acute exacerbation of CHF (congestive heart failure) ([address] (10/29/2021)      Chronic hypoxemic respiratory failure ([facility] (10/29/2021)      Anasarca (10/29/2021)      Cirrhosis ([address] (11/1/2021)      Hyperkalemia (11/2/2021)      77 y.o. female with PMH including but not limited to, COPD with chronic resp failure on 4L NC, HTN, chronic back pain, CHF (EF 60-65% on 10/29/2021),HLD, GERD, Depression, Fibromyalgia and metabolic syndrome who is seen in consultation at the request of Dr. Delma Horne for cirrhosis. She was admitted on 10/29/2021 for exacerbation of her COPD with gross anasarca. Cardiology is following who recommended GI evaluation for cirrhosis. She has associated anasarca, thrombocytopenia and portal hypertension on recent imaging. Hepatitis panel pending. MELD Na on 10/29/2021 is 7. Has had decompensation of pulmonary status and family called to BS. Is to transfer to ICU. Pt DNR and declines intubation. Plan:     -Cirrhosis likely secondary to TANG with HLD and obesity, but hepatic congestion also likely playing a role with known CHF.   Her prognosis is poor  -Agree with diuresis, on Lasix 40 mg IB BID, continue Aldactone 50 m daily   -Diagnostic paracentesis could not safely be performed to calculate SAAG score (mot enough fluid and bowel in the way)  -Agree with 2gm sodium diet  -AFP not elevated at 2.4  -Viral hepatitis panel negative      Rashmi Hutchinson NP  Patient is seen and examined in collaboration with Dr. Rosey Kelley. Assessment and plan as per Dr. Dacia Valladares.

## 2021-11-04 NOTE — PROGRESS NOTES
Comprehensive Nutrition Assessment    Type and Reason for Visit: Initial, RD nutrition re-screen/LOS      Nutrition Recommendations/Plan:    Glucerna shake once a day. Specify strawberry     Malnutrition Assessment:  Malnutrition Status: At risk for malnutrition (specify) (Reported decreased intake for 4 days since admission)    Nutrition Assessment:   Nutrition History: 11/4: Typically eats 3-4 small meals per day PTA      Nutrition Background: H/O: COPD with chronic resp failure on 4L NC, HTN, chronic back pain,,HLD, GERD, Depression, Fibromyalgia. metabolic syndrome,CHF TANG. Presented with increased SOB and generalized swelling. Findings of ascites, probable cirrhosis, COPD and CHF exacerbarions. Daily Update:  Pt seen in company of youngest daughter. Pt is bed with meal tray in front of her that has had <10% consumed. Daughter reports this was her lunch tray that has been warmed up as the daughter could not get pt to stay awake enoguh to eat lunch today. Pt nodding off at times during RD visit and did not consume any of meal during visit. Daughter reports that the pt was eating well up until about 4 days ago, and that intake has been < 25% since then. Pt says the same. Daughter gives example of in a day she might eat the total of 1 slice of bread. Pt was transferred to ICU 11/3 d.t change in respiratory and mentation. There was an order for NGT placement so medication could be administered but the pt refused placement. PCT reports pt ate 100% of breakfast this morning. Pt receptive to trying INS but only flavor she will accept is strawberry. Nutrition Related Findings:   No visible fat or muscle wasting      Current Nutrition Therapies:  ADULT DIET Regular;  Low Sodium (2 gm); 1000 ml    Current Intake:   Average Meal Intake: % (For 9 recorded meals since admission but pt/gómezhter report <25% for past ~4 days) Average Supplement Intake: None ordered      Anthropometric Measures:  Height: 5' 3\" (160 cm)  Current Body Wt: 122.4 kg (269 lb 13.5 oz) (11/4), Weight source: Bed scale  BMI: 47.8, Obese class 3 (BMI 40.0 or greater) (Edema/ascites skew BMI)  Admission Body Weight: 264 lb 8.8 oz (bed scale and standing scale, stated 250#)  Ideal Body Weight (lbs) (Calculated): 115 lbs (52 kg),    Usual Body Wt: 108.9 kg (240 lb) (July 2021 per EMR), Percent weight change: 12.4          Edema: Facial: 1+ (11/4/2021  7:00 AM)  Generalized: 1+ (11/4/2021  7:00 AM)  LLE: 2+ (11/4/2021  7:00 AM)  LUE: 2+ (11/4/2021  7:00 AM)  RLE: 2+ (11/4/2021  7:00 AM)  RUE: 2+ (11/4/2021  7:00 AM)     Estimated Daily Nutrient Needs:  Energy (kcal/day): 9216-4612 (Kcal/kg (11-14), Weight Used: Usual (109 kg))  Protein (g/day): 60-76 : ( (20% of kcal))  Fluid (ml/day):   (Other (comment) (Per MD))    Nutrition Diagnosis:   · Predicted inadequate energy intake related to  (intermittent AMS and decreased appetite with predicted intake < needs) as evidenced by  (pt reproted barriers as above,recorded intake may not capture meals that pt did not consume)    Nutrition Interventions:   Food and/or Nutrient Delivery: Continue current diet, Start oral nutrition supplement     Coordination of Nutrition Care: Continue to monitor while inpatient  Plan of Care discussed with RN    Goals: Active Goal: Intake >75% of estimated needs by follow up    Nutrition Monitoring and Evaluation:      Food/Nutrient Intake Outcomes: Food and nutrient intake, Supplement intake       Discharge Planning:     Too soon to determine    Jagdish Scott, [address], [facility] [address]

## 2021-11-04 NOTE — PROGRESS NOTES
[facility] CARDIOLOGY PROGRESS NOTE           11/4/2021 3:22 PM    Admit Date: 10/29/2021      Subjective:   Reports that she continues to have dyspnea as well as leg swelling. No other complaints at this time, worsening shortness of breath this afternoon. ROS:  Cardiovascular:  As noted above    Objective:      Vitals:    11/04/21 1115 11/04/21 1117 11/04/21 1130 11/04/21 1135   BP: (!) 121/51 (!) 121/51     Pulse: 98 95 88    Resp: 12 10 12    Temp:  98.4 °F (36.9 °C)     SpO2: 100% 100% 100% 100%   Weight:       Height:           Physical Exam:  General- mild Acute Distress, morbidly obese female   Neck- supple, no JVD  CV- regular rate and rhythm no MRG  Lung- distant lung sounds with rales at bases and diffuse wheezes   Abd- soft, nontender, nondistended  Ext- + edema bilaterally. Skin- warm and dry    Data Review:   Recent Labs     11/04/21  0302 11/03/21  1706 11/03/21  0629 11/03/21  0629     --   --  133*   K 4.6 4.8   < > 5.3*   BUN 38*  --   --  43*   CREA 0.97  --   --  0.98   *  --   --  122*   WBC  --   --   --  6.0   HGB  --   --   --  8.0*   HCT  --   --   --  26.6*   PLT  --   --   --  134*   INR 1.3  --   --  1.3    < > = values in this interval not displayed. Assessment/Plan:       Acute diastolic CHF (congestive heart failure) ([address] (10/30/2021)  -Likely predominant component of cirrhosis contributing to presentation.  -Echo with preserved EF and normal RV size and fn there is concern for diastolic dysfunction but overall, has preserved LV systolic function. -  - renal function stable  - negative 7 liters for admission    Active Problems:    Hyperlipidemia ()    - elevated LFTs     - lipids in AM with labs        Essential hypertension, benign ()   -Reasonably controlled at this point.    Morbid obesity ([address] ()       Chronic hypoxemic respiratory failure ([address] (10/29/2021)  -Likely multifactorial with obesity/hypoventilation/suspected underlying sleep apnea/diastolic dysfunction/third spacing and fluid retention from cirrhosis.   - wean oxygen as tolerated   - still on high levels 5 L NC today  - ongoing wheezing, plan for nebulizer and steroids this afternoon       COPD (chronic obstructive pulmonary disease) (HCC) ()  -on chronic 4L NC       Cirrhosis ([facility] (11/1/2021)    Anasarca (10/29/2021)  - Thought to be secondary to TANG/obesity  - seen by GI  - no ascites at time of attempted paracentesis        Hyperkalemia (11/2/2021)  - closely monitor K levels if using aldactone         Castro Cates DO  11/4/2021 3:22 PM

## 2021-11-04 NOTE — PROGRESS NOTES
Problem: Self Care Deficits Care Plan (Adult)  Goal: *Acute Goals and Plan of Care (Insert Text)  Outcome: Progressing Towards Goal  Note:   1. Patient will complete lower body dressing with contact guard assist to increase self care independence. 2. Patient will complete toileting with supervision to increase self care independence. 3. Patient will tolerate 25 minutes of OT treatment with self incorporated rest breaks to increase activity tolerance to enhance participation in hobbies. 4. Patient will complete all functional transfers with stand by assist using adaptive equipment as needed. 5. Patient will complete UE exercises with independence to increase overall activity tolerance and strength. Timeframe: 7 visits       OCCUPATIONAL THERAPY: Daily Note and AM 11/4/2021  INPATIENT: OT Visit Days: 1  Payor: Masoud Pruitt / Plan: [address] [place] [address] HMO / Product Type: [employer] Care Medicare /      NAME/AGE/GENDER: Cherri Akhtar is a 77 y.o. female   PRIMARY DIAGNOSIS:  Acute exacerbation of CHF (congestive heart failure) ([facility]) [X84.7] Acute diastolic CHF (congestive heart failure) ([facility]) Acute diastolic CHF (congestive heart failure) ([facility]       ICD-10: Treatment Diagnosis:    · Generalized Muscle Weakness (M62.81)  · Difficulty in walking, Not elsewhere classified (R26.2)  · History of falling (Z91.81)   Precautions/Allergies:   Patient has no known allergies. ASSESSMENT:     Ms. Beryl Main presents with the above diagnosis and deficits in strength, ADL function, and mobility. She is limited today by edema and shortness of breath. At baseline, she is independent with ADLs and lives with her daughter, Tamir Tran. This past week she has experienced an increased need for assistance from family to perform daily tasks. She was able to perform bed mobility with min A, mobility in room and hallway with CGA, RW, and chair follow and was left sitting up in recliner with all needs met and in reach.  She remains on 4L NC and sats in 90s. Pt is functioning below baseline and will benefit from skilled OT during hospital stay. 11-4-21 Pt ready to get up on arrival.  Pt completed functional mobility and transfers with min assist. Pt progressing. Continue OT POC. This section established at most recent assessment   PROBLEM LIST (Impairments causing functional limitations):  1. Decreased Strength  2. Decreased ADL/Functional Activities  3. Decreased Transfer Abilities  4. Decreased Ambulation Ability/Technique  5. Decreased Balance  6. Increased Pain  7. Decreased Activity Tolerance  8. Increased Shortness of Breath   INTERVENTIONS PLANNED: (Benefits and precautions of occupational therapy have been discussed with the patient.)  1. Activities of daily living training  2. Adaptive equipment training  3. Balance training  4. Clothing management  5. Cognitive training  6. Neuromuscular re-eduation  7. Therapeutic activity  8. Therapeutic exercise     TREATMENT PLAN: Frequency/Duration: Follow patient 3x/week to address above goals. Rehabilitation Potential For Stated Goals: Good     REHAB RECOMMENDATIONS (at time of discharge pending progress):    Placement: It is my opinion, based on this patient's performance to date, that Ms. Lance Montgomery may benefit from [address] after discharge due to the functional deficits listed above that are likely to improve with skilled rehabilitation because he/she has multiple medical issues that affect his/her functional mobility in the community.   Equipment:    None at this time              OCCUPATIONAL PROFILE AND HISTORY:   History of Present Injury/Illness (Reason for Referral):  See H&P  Past Medical History/Comorbidities:   Ms. Lance Montgomery  has a past medical history of Abnormal glucose level, Anxiety, Back pain, Chronic pain disorder, COPD (chronic obstructive pulmonary disease) ([facility] Depression, Elevated blood sugar, Elevated IOP, Essential hypertension, benign, Fatigue, Fibromyalgia, Fracture of distal fibula, GERD (gastroesophageal reflux disease), Ground glass opacity present on imaging of lung, Hyperlipidemia, Hypokalemia, Menopausal problem, Metabolic syndrome, Nausea, Obesity, Osteopenia, and URI (upper respiratory infection). Ms. Cony James  has a past surgical history that includes hx cholecystectomy; hx hysterectomy; hx tonsillectomy; and colonoscopy (N/A, 10/13/2016). Social History/Living Environment:   Home Environment: Patient room  # Steps to Enter: 6  Rails to Enter: Yes  One/Two Story Residence: One story  Living Alone: No  Support Systems: Child(luciana) (daughter)  Patient Expects to be Discharged to[de-identified] House  Current DME Used/Available at Home: Izell Sarks, rollator, Tub transfer bench  Tub or Shower Type: Tub/Shower combination  Prior Level of Function/Work/Activity:  Independent, lives with family     Number of Personal Factors/Comorbidities that affect the Plan of Care: Brief history (0):  LOW COMPLEXITY   ASSESSMENT OF OCCUPATIONAL PERFORMANCE[de-identified]   Activities of Daily Living:   Basic ADLs (From Assessment) Complex ADLs (From Assessment)   Feeding: Independent  Oral Facial Hygiene/Grooming: Stand-by assistance  Bathing: Moderate assistance  Upper Body Dressing: Setup  Lower Body Dressing: Moderate assistance  Toileting: Contact guard assistance     Grooming/Bathing/Dressing Activities of Daily Living     Cognitive Retraining  Safety/Judgement: Fall prevention                       Bed/Mat Mobility  Supine to Sit: Moderate assistance  Sit to Stand: Contact guard assistance  Stand to Sit: Contact guard assistance  Bed to Chair: Contact guard assistance  Scooting: Contact guard assistance  Cues: Verbal cues provided     Most Recent Physical Functioning:   Gross Assessment:                  Posture:     Balance:  Sitting: Intact  Standing: Pull to stand;  With support Bed Mobility:  Supine to Sit: Moderate assistance  Scooting: Contact guard assistance  Wheelchair Mobility:     Transfers:  Sit to Stand: Contact guard assistance  Stand to Sit: Contact guard assistance  Bed to Chair: Contact guard assistance            Patient Vitals for the past 6 hrs:   BP BP Patient Position SpO2 O2 Flow Rate (L/min) Pulse   21 0700 (!) 133/57 At rest 93 % 9 l/min (!) 102   21 0715   93 %  (!) 108   21 0730   93 % 9 l/min    21 0750 119/63 At rest 96 %  98   21 0800   95 % 5 l/min    21 1100 (!) 148/72 At rest 98 %  95   21 1115 (!) 121/51  100 %  98   21 1117 (!) 121/51 At rest 100 %  95   21 1130   100 %  88   21 1135   100 % 5 l/min        Mental Status  Neurologic State: Alert  Orientation Level: Oriented X4  Cognition: Follows commands  Perception: Appears intact  Perseveration: No perseveration noted  Safety/Judgement: Fall prevention                          Physical Skills Involved:  1. Range of Motion  2. Balance  3. Strength  4. Activity Tolerance Cognitive Skills Affected (resulting in the inability to perform in a timely and safe manner):  1. [facility] Psychosocial Skills Affected:  1. Environmental Adaptation   Number of elements that affect the Plan of Care: 3-5:  MODERATE COMPLEXITY   CLINICAL DECISION MAKIN [facility] [address] [id number] AM-PAC 6 Clicks   Daily Activity Inpatient Short Form  How much help from another person does the patient currently need. .. Total A Lot A Little None   1. Putting on and taking off regular lower body clothing? [] 1   [x] 2   [] 3   [] 4   2. Bathing (including washing, rinsing, drying)? [] 1   [x] 2   [] 3   [] 4   3. Toileting, which includes using toilet, bedpan or urinal?   [] 1   [] 2   [x] 3   [] 4   4. Putting on and taking off regular upper body clothing? [] 1   [] 2   [x] 3   [] 4   5. Taking care of personal grooming such as brushing teeth? [] 1   [] 2   [] 3   [x] 4   6. Eating meals?    [] 1   [] 2   [] 3   [x] 4   © 2007, Trustees of [employer], under license to [employer]. All rights reserved      Score:  Initial: 16 Most Recent: X (Date: -- )    Interpretation of Tool:  Represents activities that are increasingly more difficult (i.e. Bed mobility, Transfers, Gait). Medical Necessity:     · Skilled intervention continues to be required due to the above deficits. Reason for Services/Other Comments:  · Patient continues to require skilled intervention due to   · CHF exacerbation  · . Use of outcome tool(s) and clinical judgement create a POC that gives a: LOW COMPLEXITY         TREATMENT:   (In addition to Assessment/Re-Assessment sessions the following treatments were rendered)     Pre-treatment Symptoms/Complaints: Tolerated sitting up in recliner  Pain: Initial:   Pain Intensity 1: 0  Post Session:  0     Therapeutic Activity: (15): Therapeutic activities including Bed transfers, Chair transfers, and Ambulation on level ground to improve mobility, strength, and balance. Required minimal  (HHA) to promote static and dynamic balance in standing. Braces/Orthotics/Lines/Etc:   · O2 Device: Nasal cannula  Treatment/Session Assessment:    · Response to Treatment:  Good, up in chair  · Interdisciplinary Collaboration:   o Physical Therapist  o Occupational Therapist  o Registered Nurse  · After treatment position/precautions:   o Up in chair  o Bed/Chair-wheels locked  o Caregiver at bedside  o Call light within reach  o RN notified   · Compliance with Program/Exercises: Will assess as treatment progresses. · Recommendations/Intent for next treatment session: \"Next visit will focus on advancements to more challenging activities and reduction in assistance provided\".   Total Treatment Duration:  OT Patient Time In/Time Out  Time In: 1025  Time Out: 999 [facility]

## 2021-11-04 NOTE — PROGRESS NOTES
Problem: Mobility Impaired (Adult and Pediatric)  Goal: *Acute Goals and Plan of Care (Insert Text)  Outcome: Progressing Towards Goal  Note:     LTG:  (1.)Ms. Foreign Zabala will move from supine to sit and sit to supine  in bed with MODIFIED INDEPENDENCE within 7 treatment day(s). (2.)Ms. Foreign Zabala will transfer from bed to chair and chair to bed with MODIFIED INDEPENDENCE using the least restrictive device within 7 treatment day(s). (3.)Ms. Whitaker will ambulate with SUPERVISION for 300 feet with the least restrictive device within 7 treatment day(s). ________________________________________________________________________________________________        PHYSICAL THERAPY: Daily Note and AM 11/4/2021  INPATIENT: PT Visit Days : 3  Payor: Joao Morales / Plan: [address] [place] [address] HMO / Product Type: [employer] /       NAME/AGE/GENDER: Ramón Bocanegra is a 77 y.o. female   PRIMARY DIAGNOSIS: Acute exacerbation of CHF (congestive heart failure) ([facility]) [C06.5] Acute diastolic CHF (congestive heart failure) ([facility]) Acute diastolic [facility]congest[facility]       ICD-10: Treatment Diagnosis:    · Generalized Muscle Weakness (M62.81)  · Difficulty in walking, Not elsewhere classified (R26.2)   Precaution/Allergies:  Patient has no known allergies. ASSESSMENT:     Ms. Foreign Zabala was transferred to ICU yesterday but was cleared by MD for PT today. Pt is ready to get up into the chair. Bed mobility with mod assist. She requested to sit for a while due to dizziness and catching her breath. She transferred bed to chair with CGA. Exercises performed while sitting up in chair. Pt left in chair with needs in reach and daughter present. This section established at most recent assessment   PROBLEM LIST (Impairments causing functional limitations):  1. Decreased Strength  2. Decreased Transfer Abilities  3. Decreased Ambulation Ability/Technique  4. Decreased Balance  5.  Decreased Activity Tolerance   INTERVENTIONS PLANNED: (Benefits and precautions of physical therapy have been discussed with the patient.)  1. Bed Mobility  2. Gait Training  3. Therapeutic Exercise/Strengthening  4. Transfer Training     TREATMENT PLAN: Frequency/Duration: daily for duration of hospital stay  Rehabilitation Potential For Stated Goals: Good     REHAB RECOMMENDATIONS (at time of discharge pending progress):    Placement: It is my opinion, based on this patient's performance to date, that Ms. Parrish Sanchez may benefit from [address] after discharge due to the functional deficits listed above that are likely to improve with skilled rehabilitation because he/she has multiple medical issues that affect his/her functional mobility in the community. Equipment:    None at this time              HISTORY:   History of Present Injury/Illness (Reason for Referral):  Erick Angel is a 77 y.o. female with medical history of chronic hypoxemic respiratory failure (4L via NC at baseline), COPD with ongoing tobacco abuse, HTN, chronic back pain, chronic edema with probable CHF who presented with increasing dyspnea and edema. She was evaluated in the ER 10/25/2021 for the same presentation and noted to have increased symptoms > 2 weeks. Pt has underlying CHF (no echo on record) and admits to compliance with lasix. BNP was elevated 522 with trop 33.3 though cxray without gross abnl. Pt was given IV lasix and then dc'd from the ER with a 3-day prescription of lasix though daughter reports she already had the same prescription at home. Over the past couple of days, pt has had increased orthopnea, MONTELONGO, edema / anasarca and admits to increased abdominal girth. She presented back to the ER today and cxray now revealed increased pulm vascular markings with BNP slightly increased to 549 though troponin levels improved at 19.2.   Given her worsening pulmonary symptoms worsened by her underlying COPD which is now in exacerbation, Hospitalist consulted by ER for inpatient admission. Of significance pt denies hx of CHF and in her family hx, only cardiac hx is hypertension in her mother and MI in her paternal grandfather. Past Medical History/Comorbidities:   Ms. Luis Espana  has a past medical history of Abnormal glucose level, Anxiety, Back pain, Chronic pain disorder, COPD (chronic obstructive pulmonary disease) ([address] Depression, Elevated blood sugar, Elevated IOP, Essential hypertension, benign, Fatigue, Fibromyalgia, Fracture of distal fibula, GERD (gastroesophageal reflux disease), Ground glass opacity present on imaging of lung, Hyperlipidemia, Hypokalemia, Menopausal problem, Metabolic syndrome, Nausea, Obesity, Osteopenia, and URI (upper respiratory infection). Ms. Luis Espana  has a past surgical history that includes hx cholecystectomy; hx hysterectomy; hx tonsillectomy; and colonoscopy (N/A, 10/13/2016). Social History/Living Environment:   Home Environment: Patient room  # Steps to Enter: 6  Rails to Enter: Yes  One/Two Story Residence: One story  Living Alone: No  Support Systems: Child(luciana) (daughter)  Patient Expects to be Discharged to[de-identified] House  Current DME Used/Available at Home: Charna Carolyn, rollator, Tub transfer bench  Tub or Shower Type: Tub/Shower combination  Prior Level of Function/Work/Activity:  Independent with rollator at home. Lives with her daughter     Number of Personal Factors/Comorbidities that affect the Plan of Care: 0: LOW COMPLEXITY   EXAMINATION:   Most Recent Physical Functioning:   Gross Assessment:                  Posture:     Balance:  Sitting: Intact  Standing: Pull to stand;  With support Bed Mobility:  Supine to Sit: Moderate assistance  Scooting: Minimum assistance  Wheelchair Mobility:     Transfers:  Sit to Stand: Contact guard assistance  Stand to Sit: Contact guard assistance  Bed to Chair: Contact guard assistance  Gait:     Base of Support: Widened  Speed/Zair: Delayed; Pace decreased (<100 feet/min)  Gait Abnormalities: Decreased step clearance; Trunk sway increased  Distance (ft): 5 Feet (ft)  Assistive Device: Other (comment)  Ambulation - Level of Assistance: Contact guard assistance  Interventions:  (HHA)      Body Structures Involved:  1. Muscles Body Functions Affected:  1. Movement Related Activities and Participation Affected:  1. Mobility   Number of elements that affect the Plan of Care: 3: MODERATE COMPLEXITY   CLINICAL PRESENTATION:   Presentation: Stable and uncomplicated: LOW COMPLEXITY   CLINICAL DECISION MAKIN[address] [place] [address] AM-PAC 6 Clicks   Basic Mobility Inpatient Short Form  How much difficulty does the patient currently have. .. Unable A Lot A Little None   1. Turning over in bed (including adjusting bedclothes, sheets and blankets)? [] 1   [] 2   [x] 3   [] 4   2. Sitting down on and standing up from a chair with arms ( e.g., wheelchair, bedside commode, etc.)   [] 1   [] 2   [x] 3   [] 4   3. Moving from lying on back to sitting on the side of the bed? [] 1   [] 2   [x] 3   [] 4   How much help from another person does the patient currently need. .. Total A Lot A Little None   4. Moving to and from a bed to a chair (including a wheelchair)? [] 1   [] 2   [x] 3   [] 4   5. Need to walk in hospital room? [] 1   [] 2   [x] 3   [] 4   6. Climbing 3-5 steps with a railing? [] 1   [] 2   [x] 3   [] 4   © , Trustees of [address] [place] [address], under license to [employer]. All rights reserved      Score:  Initial: 18 Most Recent: X (Date: -- )    Interpretation of Tool:  Represents activities that are increasingly more difficult (i.e. Bed mobility, Transfers, Gait). Medical Necessity:     · Patient is expected to demonstrate progress in   · strength, balance, and functional technique  ·  to   · increase independence with gait and transfers  · .   Reason for Services/Other Comments:  · Patient continues to require skilled intervention due to · Limited functional independence  · . Use of outcome tool(s) and clinical judgement create a POC that gives a: Clear prediction of patient's progress: LOW COMPLEXITY            TREATMENT:   (In addition to Assessment/Re-Assessment sessions the following treatments were rendered)   Pre-treatment Symptoms/Complaints:  Complaints of pain from not moving  Pain: Initial:      Post Session:       Therapeutic Activity (15 Minutes): Therapeutic activity included Supine to Sit, Lateral Scooting, Ambulation on level ground, Sitting balance  and Standing balance to improve functional Mobility, Strength and Activity tolerance. Therapeutic Exercise (10 Minutes): Therapeutic exercises noted below to improve functional activity tolerance, AROM and mobility. Braces/Orthotics/Lines/Etc:   · IV  · mckoy catheter  · O2 Device: Nasal cannula  Treatment/Session Assessment:    · Response to Treatment:  Required increased time with mobility   · Interdisciplinary Collaboration:   o Physical Therapy Assistant  o Registered Nurse  · After treatment position/precautions:   o Up in chair  o Bed/Chair-wheels locked  o Bed in low position  o Call light within reach  o RN notified  o Family at bedside   · Compliance with Program/Exercises: Compliant all of the time  · Recommendations/Intent for next treatment session: \"Next visit will focus on advancements to more challenging activities and reduction in assistance provided\".   Total Treatment Duration:  PT Patient Time In/Time Out  Time In: 1040  Time Out: [facility], [facility]

## 2021-11-04 NOTE — PROGRESS NOTES
Bedside and Verbal shift change report given to [address], Rns (oncoming nurse) by Marcia Sam (offgoing nurse). Report included the following information SBAR, Kardex, ED Summary, Procedure Summary, Intake/Output, Recent Results, Cardiac Rhythm ST and Alarm Parameters .

## 2021-11-04 NOTE — PROGRESS NOTES
Saw pt in interdisciplinarily rounds with the following disciplines: Physician, Case Management, Nursing, Dietary,Therapy and Pharmacy. The plan of care was discussed along with goals for discharge date/ location. Pt on 5L NC now and normally on 4L at home. Therapy \"clear\" to see pt today. Will follow to help d/c plan.n Pt goal is to return home with dtr/Nury.

## 2021-11-04 NOTE — PROGRESS NOTES
Progress Note    Patient: Harrison Robertson MRN: [id number]  SSN: [id number]    YOB: 1955  Age: 77 y.o. Sex: female      Admit Date: 10/29/2021    LOS: 6 days     Subjective:   F/U acute on chronic respiratory failure, cirrhosis, CHF exacerbation    \"65 y.o. female with medical history of chronic hypoxemic respiratory failure (4L via NC at baseline), COPD with ongoing tobacco abuse, HTN, chronic back pain, chronic edema with probable CHF who presented with increasing dyspnea and edema.  She was evaluated in the ER 10/25/2021 for the same presentation and noted to have increased symptoms > 2 weeks.  Pt has underlying CHF (no echo on record) and admits to compliance with lasix.  BNP was elevated 522 with trop 33.3 though cxray without gross abnl.  Pt was given IV lasix and then dc'd from the ER with a 3-day prescription of lasix though daughter reports she already had the same prescription at home.     Over the past couple of days, pt has had increased orthopnea, MONTELONGO, edema / anasarca and admits to increased abdominal girth.  She presented back to the ER today and cxray now revealed increased pulm vascular markings with BNP slightly increased to 549 though troponin levels improved at 19.2. Kennis Mcardle her worsening pulmonary symptoms worsened by her underlying COPD which is now in exacerbation, Hospitalist consulted by ER for inpatient admission. \" Cardiology following. Lasix started but was held over the past couple of days. Bumex then started on 11/3. Spironolactone also added. New diagnosis of cirrhosis. Plan was for paracentesis on 11/3 but not enough fluid to drain. ECHO showing grade 1 diastolic dysfunction with EF 60%    Was moved to ICU on 11/3 due to increased dyspnea. Is/Os - (-)7L, good urine output. No chest pain or SOB.  Now down to 5L NC. K normal.   Current Facility-Administered Medications   Medication Dose Route Frequency    ALPRAZolam (XANAX) tablet 1 mg  1 mg Oral TID PRN    spironolactone (ALDACTONE) tablet 50 mg  50 mg Oral DAILY    piperacillin-tazobactam (ZOSYN) 4.5 g in 0.9% sodium chloride (MBP/ADV) 100 mL MBP  4.5 g IntraVENous Q8H    bumetanide (BUMEX) injection 1 mg  1 mg IntraVENous BID    methylPREDNISolone (PF) (SOLU-MEDROL) injection 40 mg  40 mg IntraVENous Q8H    nystatin (MYCOSTATIN) 100,000 unit/gram powder   Topical BID    aspirin delayed-release tablet 81 mg  81 mg Oral DAILY    buPROPion SR (WELLBUTRIN SR) tablet 150 mg  150 mg Oral BID    DULoxetine (CYMBALTA) capsule 60 mg  60 mg Oral DAILY    ezetimibe/atorvastatin 10/10 mg   Oral DAILY    [Held by provider] gabapentin (NEURONTIN) capsule 600 mg  600 mg Oral QHS    latanoprost (XALATAN) 0.005 % ophthalmic solution 1 Drop  1 Drop Both Eyes QHS    sodium chloride (NS) flush 5-40 mL  5-40 mL IntraVENous Q8H    sodium chloride (NS) flush 5-40 mL  5-40 mL IntraVENous PRN    acetaminophen (TYLENOL) tablet 650 mg  650 mg Oral Q6H PRN    Or    acetaminophen (TYLENOL) suppository 650 mg  650 mg Rectal Q6H PRN    polyethylene glycol (MIRALAX) packet 17 g  17 g Oral DAILY PRN    ondansetron (ZOFRAN ODT) tablet 4 mg  4 mg Oral Q8H PRN    Or    ondansetron (ZOFRAN) injection 4 mg  4 mg IntraVENous Q6H PRN    enoxaparin (LOVENOX) injection 40 mg  40 mg SubCUTAneous Q12H    nicotine (NICODERM CQ) 14 mg/24 hr patch 1 Patch  1 Patch TransDERmal DAILY    pantoprazole (PROTONIX) tablet 40 mg  40 mg Oral ACB    arformoteroL (BROVANA) neb solution 15 mcg  15 mcg Nebulization BID RT    budesonide (PULMICORT) 500 mcg/2 ml nebulizer suspension  500 mcg Nebulization BID RT    albuterol-ipratropium (DUO-NEB) 2.5 MG-0.5 MG/3 ML  3 mL Nebulization Q4H RT    tiotropium bromide (SPIRIVA RESPIMAT) 2.5 mcg /actuation  2 Puff Inhalation DAILY    [Held by provider] losartan (COZAAR) tablet 50 mg  50 mg Oral DAILY    HYDROcodone-acetaminophen (NORCO)  mg tablet 1 Tablet  1 Tablet Oral Q6H PRN Objective:     Patient Vitals for the past 24 hrs:   Temp Pulse Resp BP SpO2   11/04/21 0750 98.3 °F (36.8 °C) 98 21 119/63 96 %   11/04/21 0730     93 %   11/04/21 0715  (!) 108 11  93 %   11/04/21 0700 98.3 °F (36.8 °C) (!) 102 18 (!) 133/57 93 %   11/04/21 0500  97 12 134/64 94 %   11/04/21 0409     96 %   11/04/21 0400  82 11 (!) 135/55 95 %   11/04/21 0335 98.4 °F (36.9 °C)       11/04/21 0300  (!) 103 16  96 %   11/04/21 0200  93 11 (!) 135/53 95 %   11/04/21 0100  (!) 101 16 (!) 132/59 93 %   11/04/21 0000  99 12 136/61 95 %   11/03/21 2344     95 %   11/03/21 2324 98.3 °F (36.8 °C)       11/03/21 2300  (!) 107 13 (!) 121/52 92 %   11/03/21 2230  (!) 106 11 129/60 90 %   11/03/21 2200  (!) 104 18 (!) 144/71 92 %   11/03/21 2130  (!) 101 18 (!) 152/64 92 %   11/03/21 2100  (!) 103 28 (!) 149/66 92 %   11/03/21 2030  100 24 (!) 155/67 97 %   11/03/21 2017     94 %   11/03/21 2000  95 11 (!) 150/73 93 %   11/03/21 1930  93 15 (!) 149/67 94 %   11/03/21 1909 98.3 °F (36.8 °C)       11/03/21 1900  (!) 105 13 (!) 158/72    11/03/21 1641     94 %   11/03/21 1633 98.4 °F (36.9 °C) (!) 101 22 129/75 91 %   11/03/21 1208     95 %   11/03/21 1109 97.9 °F (36.6 °C) 90 22 129/68    11/03/21 0849    130/69    11/03/21 0843     99 %         Intake and Output:  Current Shift: No intake/output data recorded. Last three shifts: 11/02 1901 - 11/04 0700  In: -   Out: 4500 [Urine:4500]    Physical Exam:   General:  Alert, very talkative, tearful at times saying she wants to get in the chair   Eyes:  Conjunctivae/corneas clear. PERRL   Ears:  Normal TMs and external ear canals both ears. Nose: Nares normal.    Mouth/Throat: Lips, mucosa, and tongue normal.    Neck:  no JVD. Back:   deferred. Lungs:   Diffuse wheezing, but improved from yesterday   Heart:  Regular rate and rhythm, S1, S2 normal   Abdomen:   Soft, non-tender.  Morbidly obese   Extremities: 3+ edema with erythema to LE bilaterally and UE bilaterally. Erythema improved from yesterday   Pulses: 2+ and symmetric all extremities. Skin: As above    Lymph nodes: Cervical, supraclavicular, and axillary nodes normal.   Neurologic: More relaxed today        Lab/Data Review:    Recent Results (from the past 24 hour(s))   GLUCOSE, POC    Collection Time: 11/03/21  8:32 AM   Result Value Ref Range    Glucose (POC) 135 (H) 65 - 100 mg/dL    [facility]    CULTURE, URINE    Collection Time: 11/03/21  4:21 PM    Specimen: Clean catch; Urine    URINE   Result Value Ref Range    Special Requests: NO SPECIAL REQUESTS      Culture result:        NO GROWTH AFTER SHORT PERIOD OF INCUBATION. FURTHER RESULTS TO FOLLOW AFTER OVERNIGHT INCUBATION. POTASSIUM    Collection Time: 11/03/21  5:06 PM   Result Value Ref Range    Potassium 4.8 3.5 - 5.1 mmol/L   METABOLIC PANEL, COMPREHENSIVE    Collection Time: 11/04/21  3:02 AM   Result Value Ref Range    Sodium 136 136 - 145 mmol/L    Potassium 4.6 3.5 - 5.1 mmol/L    Chloride 97 (L) 98 - 107 mmol/L    CO2 39 (H) 21 - 32 mmol/L    Anion gap 0 (L) 7 - 16 mmol/L    Glucose 114 (H) 65 - 100 mg/dL    BUN 38 (H) 8 - 23 MG/DL    Creatinine 0.97 0.6 - 1.0 MG/DL    GFR est AA >60 >60 ml/min/1.73m2    GFR est non-AA >60 >60 ml/min/1.73m2    Calcium 9.0 8.3 - 10.4 MG/DL    Bilirubin, total 0.9 0.2 - 1.1 MG/DL    ALT (SGPT) 40 12 - 65 U/L    AST (SGOT) 31 15 - 37 U/L    Alk.  phosphatase 92 50 - 136 U/L    Protein, total 6.6 6.3 - 8.2 g/dL    Albumin 3.1 (L) 3.2 - 4.6 g/dL    Globulin 3.5 2.3 - 3.5 g/dL    A-G Ratio 0.9 (L) 1.2 - 3.5     PROTHROMBIN TIME + INR    Collection Time: 11/04/21  3:02 AM   Result Value Ref Range    Prothrombin time 16.8 (H) 12.6 - 14.5 sec    INR 1.3         Assessment/ Plan:     Principal Problem:    Acute diastolic CHF (congestive heart failure) ([facility] (10/30/2021)    Active Problems:    GERD (gastroesophageal reflux disease) ()      Overview: EGD October 2016 showed an irregular Z line, and mild diffuse gastritis       and there are biopsy reports under pathology--Dr. Rubin Andino      Hyperlipidemia ()      Fibromyalgia ()      Anxiety ()      Essential hypertension, benign ()      Morbid obesity (HCC) ()      COPD (chronic obstructive pulmonary disease) ([facility]) ()      Acute exacerbation of CHF (congestive heart failure) ([address] (10/29/2021)      Chronic hypoxemic respiratory failure ([address] (10/29/2021)      Anasarca (10/29/2021)      Cirrhosis ([address] (11/1/2021)      Hyperkalemia (11/2/2021)    Acute diastolic CHF (congestive heart failure) ([address] (10/30/2021)  Bumex 1mg IV BID  Spironolactone 50mg to start today  Monitor kidney function    Mild hyperkalemia  S/p Lokelma. K improved     Hypertension  Losartan has been held secondary to mild decrease in kidney function after Lasix. Kidney functions improving.         COPD exacerbation (chronic obstructive pulmonary disease) (HCC) ()   Solu-Medrol 40 every 8hr  Continue nebs  Has bilateral wheezing, but improved from yesterday  Due to AMS yesterday, Zosyn was started for potential aspiration. Chest x ray pending for today.        Chronic hypoxemic respiratory failure ([address] (10/29/2021)  On 4 L/min at home, currently on 5L NC       Anasarca (10/29/2021)  secondary to CHF and cirrhosis, slightly improved. On bumex and spironolactone.      Morbid obesity  BMI 46.85     Anxiety/depression  Continue Wellbutrin, Cymbalta     Hyperlipidemia  Continue home medication    Cirrhosis   GI following    UA from 10/30 with possible early UTI, so want to see if this will help with her mentation. Ordered urine culture 11/3.      DVT prophylaxis - Lovenox  Signed By: Caio Jasso DO     November 4, 2021

## 2021-11-05 PROBLEM — E87.5 HYPERKALEMIA: Status: RESOLVED | Noted: 2021-11-02 | Resolved: 2021-11-05

## 2021-11-05 PROBLEM — K74.60 CIRRHOSIS (HCC): Chronic | Status: ACTIVE | Noted: 2021-11-01

## 2021-11-05 PROBLEM — Z72.0 TOBACCO ABUSE: Chronic | Status: ACTIVE | Noted: 2021-11-05

## 2021-11-05 PROBLEM — R06.89 HYPERCAPNIA: Status: ACTIVE | Noted: 2021-11-05

## 2021-11-05 PROBLEM — J96.11 CHRONIC HYPOXEMIC RESPIRATORY FAILURE (HCC): Chronic | Status: ACTIVE | Noted: 2021-10-29

## 2021-11-05 LAB
ANION GAP SERPL CALC-SCNC: 1 MMOL/L (ref 7–16)
BUN SERPL-MCNC: 38 MG/DL (ref 8–23)
CALCIUM SERPL-MCNC: 8.8 MG/DL (ref 8.3–10.4)
CHLORIDE SERPL-SCNC: 97 MMOL/L (ref 98–107)
CHOLEST SERPL-MCNC: 135 MG/DL
CO2 SERPL-SCNC: 39 MMOL/L (ref 21–32)
CREAT SERPL-MCNC: 0.85 MG/DL (ref 0.6–1)
GLUCOSE SERPL-MCNC: 126 MG/DL (ref 65–100)
HDLC SERPL-MCNC: 50 MG/DL (ref 40–60)
HDLC SERPL: 2.7 {RATIO}
INR PPP: 1.3
LDLC SERPL CALC-MCNC: 69.8 MG/DL
POTASSIUM SERPL-SCNC: 4.7 MMOL/L (ref 3.5–5.1)
PROTHROMBIN TIME: 16.8 SEC (ref 12.6–14.5)
SODIUM SERPL-SCNC: 137 MMOL/L (ref 136–145)
TRIGL SERPL-MCNC: 76 MG/DL (ref 35–150)
VLDLC SERPL CALC-MCNC: 15.2 MG/DL (ref 6–23)

## 2021-11-05 PROCEDURE — 65610000001 HC ROOM ICU GENERAL

## 2021-11-05 PROCEDURE — 80048 BASIC METABOLIC PNL TOTAL CA: CPT

## 2021-11-05 PROCEDURE — 97530 THERAPEUTIC ACTIVITIES: CPT

## 2021-11-05 PROCEDURE — 36415 COLL VENOUS BLD VENIPUNCTURE: CPT

## 2021-11-05 PROCEDURE — 94640 AIRWAY INHALATION TREATMENT: CPT

## 2021-11-05 PROCEDURE — 94762 N-INVAS EAR/PLS OXIMTRY CONT: CPT

## 2021-11-05 PROCEDURE — 74011250637 HC RX REV CODE- 250/637: Performed by: FAMILY MEDICINE

## 2021-11-05 PROCEDURE — 74011250636 HC RX REV CODE- 250/636: Performed by: FAMILY MEDICINE

## 2021-11-05 PROCEDURE — 80061 LIPID PANEL: CPT

## 2021-11-05 PROCEDURE — 74011250636 HC RX REV CODE- 250/636: Performed by: PHYSICIAN ASSISTANT

## 2021-11-05 PROCEDURE — 74011250636 HC RX REV CODE- 250/636: Performed by: INTERNAL MEDICINE

## 2021-11-05 PROCEDURE — 99223 1ST HOSP IP/OBS HIGH 75: CPT | Performed by: INTERNAL MEDICINE

## 2021-11-05 PROCEDURE — 74011000258 HC RX REV CODE- 258: Performed by: FAMILY MEDICINE

## 2021-11-05 PROCEDURE — 94760 N-INVAS EAR/PLS OXIMETRY 1: CPT

## 2021-11-05 PROCEDURE — 77010033678 HC OXYGEN DAILY

## 2021-11-05 PROCEDURE — 74011250637 HC RX REV CODE- 250/637: Performed by: INTERNAL MEDICINE

## 2021-11-05 PROCEDURE — 99232 SBSQ HOSP IP/OBS MODERATE 35: CPT | Performed by: INTERNAL MEDICINE

## 2021-11-05 PROCEDURE — 74011000250 HC RX REV CODE- 250: Performed by: PHYSICIAN ASSISTANT

## 2021-11-05 PROCEDURE — 74011250637 HC RX REV CODE- 250/637: Performed by: PHYSICIAN ASSISTANT

## 2021-11-05 PROCEDURE — 74011000250 HC RX REV CODE- 250: Performed by: INTERNAL MEDICINE

## 2021-11-05 PROCEDURE — 85610 PROTHROMBIN TIME: CPT

## 2021-11-05 RX ORDER — PREDNISONE 10 MG/1
40 TABLET ORAL
Status: DISCONTINUED | OUTPATIENT
Start: 2021-11-06 | End: 2021-11-07 | Stop reason: HOSPADM

## 2021-11-05 RX ADMIN — BUDESONIDE 500 MCG: 0.5 INHALANT RESPIRATORY (INHALATION) at 20:02

## 2021-11-05 RX ADMIN — SPIRONOLACTONE 50 MG: 25 TABLET ORAL at 09:03

## 2021-11-05 RX ADMIN — TIOTROPIUM BROMIDE INHALATION SPRAY 2 PUFF: 3.12 SPRAY, METERED RESPIRATORY (INHALATION) at 07:33

## 2021-11-05 RX ADMIN — Medication 10 ML: at 06:06

## 2021-11-05 RX ADMIN — IPRATROPIUM BROMIDE AND ALBUTEROL SULFATE 3 ML: .5; 3 SOLUTION RESPIRATORY (INHALATION) at 07:31

## 2021-11-05 RX ADMIN — BUMETANIDE 1 MG: 0.25 INJECTION INTRAMUSCULAR; INTRAVENOUS at 18:47

## 2021-11-05 RX ADMIN — ENOXAPARIN SODIUM 40 MG: 100 INJECTION SUBCUTANEOUS at 09:05

## 2021-11-05 RX ADMIN — METHYLPREDNISOLONE SODIUM SUCCINATE 40 MG: 40 INJECTION, POWDER, FOR SOLUTION INTRAMUSCULAR; INTRAVENOUS at 00:13

## 2021-11-05 RX ADMIN — HYDROCODONE BITARTRATE AND ACETAMINOPHEN 1 TABLET: 10; 325 TABLET ORAL at 03:48

## 2021-11-05 RX ADMIN — Medication 81 MG: at 09:03

## 2021-11-05 RX ADMIN — NYSTATIN: 100000 POWDER TOPICAL at 18:47

## 2021-11-05 RX ADMIN — ALPRAZOLAM 1 MG: 0.5 TABLET ORAL at 09:12

## 2021-11-05 RX ADMIN — PANTOPRAZOLE SODIUM 40 MG: 40 TABLET, DELAYED RELEASE ORAL at 09:03

## 2021-11-05 RX ADMIN — IPRATROPIUM BROMIDE AND ALBUTEROL SULFATE 3 ML: .5; 3 SOLUTION RESPIRATORY (INHALATION) at 15:40

## 2021-11-05 RX ADMIN — IPRATROPIUM BROMIDE AND ALBUTEROL SULFATE 3 ML: .5; 3 SOLUTION RESPIRATORY (INHALATION) at 00:08

## 2021-11-05 RX ADMIN — Medication 10 ML: at 14:32

## 2021-11-05 RX ADMIN — BUPROPION HYDROCHLORIDE 150 MG: 150 TABLET, EXTENDED RELEASE ORAL at 18:47

## 2021-11-05 RX ADMIN — NYSTATIN: 100000 POWDER TOPICAL at 09:00

## 2021-11-05 RX ADMIN — BUPROPION HYDROCHLORIDE 150 MG: 150 TABLET, EXTENDED RELEASE ORAL at 09:03

## 2021-11-05 RX ADMIN — ALPRAZOLAM 1 MG: 0.5 TABLET ORAL at 21:44

## 2021-11-05 RX ADMIN — BUMETANIDE 1 MG: 0.25 INJECTION INTRAMUSCULAR; INTRAVENOUS at 09:04

## 2021-11-05 RX ADMIN — IPRATROPIUM BROMIDE AND ALBUTEROL SULFATE 3 ML: .5; 3 SOLUTION RESPIRATORY (INHALATION) at 20:02

## 2021-11-05 RX ADMIN — METHYLPREDNISOLONE SODIUM SUCCINATE 40 MG: 40 INJECTION, POWDER, FOR SOLUTION INTRAMUSCULAR; INTRAVENOUS at 09:04

## 2021-11-05 RX ADMIN — Medication 10 ML: at 21:38

## 2021-11-05 RX ADMIN — CEFTRIAXONE 1 G: 1 INJECTION, POWDER, FOR SOLUTION INTRAMUSCULAR; INTRAVENOUS at 09:04

## 2021-11-05 RX ADMIN — ENOXAPARIN SODIUM 40 MG: 100 INJECTION SUBCUTANEOUS at 21:38

## 2021-11-05 RX ADMIN — ATORVASTATIN CALCIUM: 10 TABLET, FILM COATED ORAL at 09:03

## 2021-11-05 RX ADMIN — ARFORMOTEROL TARTRATE 15 MCG: 15 SOLUTION RESPIRATORY (INHALATION) at 20:59

## 2021-11-05 RX ADMIN — METHYLPREDNISOLONE SODIUM SUCCINATE 40 MG: 40 INJECTION, POWDER, FOR SOLUTION INTRAMUSCULAR; INTRAVENOUS at 21:37

## 2021-11-05 RX ADMIN — ARFORMOTEROL TARTRATE 15 MCG: 15 SOLUTION RESPIRATORY (INHALATION) at 07:31

## 2021-11-05 RX ADMIN — IPRATROPIUM BROMIDE AND ALBUTEROL SULFATE 3 ML: .5; 3 SOLUTION RESPIRATORY (INHALATION) at 12:01

## 2021-11-05 RX ADMIN — IPRATROPIUM BROMIDE AND ALBUTEROL SULFATE 3 ML: .5; 3 SOLUTION RESPIRATORY (INHALATION) at 04:12

## 2021-11-05 RX ADMIN — DULOXETINE HYDROCHLORIDE 60 MG: 60 CAPSULE, DELAYED RELEASE ORAL at 09:03

## 2021-11-05 RX ADMIN — HYDROCODONE BITARTRATE AND ACETAMINOPHEN 1 TABLET: 10; 325 TABLET ORAL at 19:21

## 2021-11-05 RX ADMIN — HYDROCODONE BITARTRATE AND ACETAMINOPHEN 1 TABLET: 10; 325 TABLET ORAL at 09:12

## 2021-11-05 RX ADMIN — LATANOPROST 1 DROP: 50 SOLUTION OPHTHALMIC at 21:38

## 2021-11-05 RX ADMIN — IPRATROPIUM BROMIDE AND ALBUTEROL SULFATE 3 ML: .5; 3 SOLUTION RESPIRATORY (INHALATION) at 23:55

## 2021-11-05 RX ADMIN — BUDESONIDE 500 MCG: 0.5 INHALANT RESPIRATORY (INHALATION) at 07:31

## 2021-11-05 NOTE — PROGRESS NOTES
Progress Note    Patient: Zoila Quiles MRN: [id number]  SSN: [id number]    YOB: 1955  Age: 77 y.o. Sex: female      Admit Date: 10/29/2021    LOS: 7 days     Subjective:   F/U acute on chronic respiratory failure, cirrhosis, CHF exacerbation    \"65 y.o. female with medical history of chronic hypoxemic respiratory failure (4L via NC at baseline), COPD with ongoing tobacco abuse, HTN, chronic back pain, chronic edema with probable CHF who presented with increasing dyspnea and edema.  She was evaluated in the ER 10/25/2021 for the same presentation and noted to have increased symptoms > 2 weeks.  Pt has underlying CHF (no echo on record) and admits to compliance with lasix.  BNP was elevated 522 with trop 33.3 though cxray without gross abnl.  Pt was given IV lasix and then dc'd from the ER with a 3-day prescription of lasix though daughter reports she already had the same prescription at home.     Over the past couple of days, pt has had increased orthopnea, MONTELONGO, edema / anasarca and admits to increased abdominal girth.  She presented back to the ER today and cxray now revealed increased pulm vascular markings with BNP slightly increased to 549 though troponin levels improved at 19.2. René Brochure her worsening pulmonary symptoms worsened by her underlying COPD which is now in exacerbation, Hospitalist consulted by ER for inpatient admission. \" Cardiology following. Lasix started but was held over the past couple of days. Bumex then started on 11/3. Spironolactone also added. New diagnosis of cirrhosis. Plan was for paracentesis on 11/3 but not enough fluid to drain. ECHO showing grade 1 diastolic dysfunction with EF 60%    Hx of morbid obesity. Admits to snoring at night and day time sleepiness. Has never been evaluated for sleep apnea. UTI also found. Urine culture pending. Zosyn given on 11/3-11/4 but changed to Ceftriaxone on 11/5.      Was moved to ICU on 11/3 due to increased dyspnea. Now with order for medical floor. Is/Os - (-)8L, good urine output. No chest pain or SOB. Now down to 7L NC. Feeling better.     Current Facility-Administered Medications   Medication Dose Route Frequency    cefTRIAXone (ROCEPHIN) 1 g in 0.9% sodium chloride (MBP/ADV) 50 mL MBP  1 g IntraVENous Q24H    ALPRAZolam (XANAX) tablet 1 mg  1 mg Oral TID PRN    spironolactone (ALDACTONE) tablet 50 mg  50 mg Oral DAILY    bumetanide (BUMEX) injection 1 mg  1 mg IntraVENous BID    methylPREDNISolone (PF) (SOLU-MEDROL) injection 40 mg  40 mg IntraVENous Q8H    nystatin (MYCOSTATIN) 100,000 unit/gram powder   Topical BID    aspirin delayed-release tablet 81 mg  81 mg Oral DAILY    buPROPion SR (WELLBUTRIN SR) tablet 150 mg  150 mg Oral BID    DULoxetine (CYMBALTA) capsule 60 mg  60 mg Oral DAILY    ezetimibe/atorvastatin 10/10 mg   Oral DAILY    [Held by provider] gabapentin (NEURONTIN) capsule 600 mg  600 mg Oral QHS    latanoprost (XALATAN) 0.005 % ophthalmic solution 1 Drop  1 Drop Both Eyes QHS    sodium chloride (NS) flush 5-40 mL  5-40 mL IntraVENous Q8H    sodium chloride (NS) flush 5-40 mL  5-40 mL IntraVENous PRN    acetaminophen (TYLENOL) tablet 650 mg  650 mg Oral Q6H PRN    Or    acetaminophen (TYLENOL) suppository 650 mg  650 mg Rectal Q6H PRN    polyethylene glycol (MIRALAX) packet 17 g  17 g Oral DAILY PRN    ondansetron (ZOFRAN ODT) tablet 4 mg  4 mg Oral Q8H PRN    Or    ondansetron (ZOFRAN) injection 4 mg  4 mg IntraVENous Q6H PRN    enoxaparin (LOVENOX) injection 40 mg  40 mg SubCUTAneous Q12H    nicotine (NICODERM CQ) 14 mg/24 hr patch 1 Patch  1 Patch TransDERmal DAILY    pantoprazole (PROTONIX) tablet 40 mg  40 mg Oral ACB    arformoteroL (BROVANA) neb solution 15 mcg  15 mcg Nebulization BID RT    budesonide (PULMICORT) 500 mcg/2 ml nebulizer suspension  500 mcg Nebulization BID RT    albuterol-ipratropium (DUO-NEB) 2.5 MG-0.5 MG/3 ML  3 mL Nebulization Q4H RT    tiotropium bromide (SPIRIVA RESPIMAT) 2.5 mcg /actuation  2 Puff Inhalation DAILY    [Held by provider] losartan (COZAAR) tablet 50 mg  50 mg Oral DAILY    HYDROcodone-acetaminophen (NORCO)  mg tablet 1 Tablet  1 Tablet Oral Q6H PRN       Objective:     Patient Vitals for the past 24 hrs:   Temp Pulse Resp BP SpO2   11/05/21 0705 98.3 °F (36.8 °C) 100 18 (!) 149/55 97 %   11/05/21 0412     98 %   11/05/21 0348 98.2 °F (36.8 °C) (!) 104 18 (!) 140/68 95 %   11/05/21 0249  (!) 102 14 (!) 166/76 98 %   11/05/21 0149  95 12 (!) 141/65    11/05/21 0008     97 %   11/04/21 2350 98.4 °F (36.9 °C) (!) 101 16 (!) 142/67 97 %   11/04/21 2151  (!) 102 14 139/71 95 %   11/04/21 2052  92 17 (!) 151/67 97 %   11/1953  89 29  98 %   11/04/21 1952 97.9 °F (36.6 °C) 87 13 (!) 153/66 97 %   11/04/21 1951  (!) 101 14  98 %   11/04/21 1950  (!) 106 12  98 %   11/04/21 1949  (!) 105 21  97 %   11/04/21 1948  99 9  97 %   11/04/21 1947  (!) 108 17  97 %   11/04/21 1946  (!) 109 13  97 %   11/04/21 1945  100 17  97 %   11/04/21 1944  (!) 105 18  98 %   11/04/21 1943  (!) 107 13  97 %   11/04/21 1942  90 13  98 %   11/04/21 1941  99 18  98 %   11/04/21 1940  99 11  96 %   11/04/21 1939  (!) 102 12  97 %   11/04/21 1938  100 16  97 %   11/04/21 1937  96 19  96 %   11/04/21 1936  (!) 109 18  95 %   11/04/21 1935  (!) 104 24  95 %   11/04/21 1934  99 22  94 %   11/04/21 1730  (!) 107 30  96 %   11/04/21 1715  96 12  96 %   11/04/21 1700  97 17 (!) 150/64 98 %   11/04/21 1645  95 23  98 %   11/04/21 1630  96 19  98 %   11/04/21 1615  95 23  98 %   11/04/21 1600 98.2 °F (36.8 °C) (!) 111 25 134/66 99 %   11/04/21 1554     99 %   11/04/21 1545  95 22  99 %   11/04/21 1530  93 20  99 %   11/04/21 1515 98.4 °F (36.9 °C) 92 28 (!) 155/79 94 %   11/04/21 1135     100 %   11/04/21 1130  88 12  100 %   11/04/21 1117 98.4 °F (36.9 °C) 95 10 (!) 121/51 100 % 11/04/21 1115  98 12 (!) 121/51 100 %   11/04/21 1100 98.4 °F (36.9 °C) 95 19 (!) 148/72 98 %   11/04/21 0800     95 %   11/04/21 0750 98.3 °F (36.8 °C) 98 21 119/63 96 %   11/04/21 0730     93 %         Intake and Output:  Current Shift: 11/05 0701 - 11/05 1900  In: -   Out: 325 [Urine:325]  Last three shifts: 11/03 1901 - 11/05 0700  In: 780 [P.O.:480; I.V.:300]  Out: 3270 [Urine:3270]    Physical Exam:   General:  Alert, very talkative without significant edema   Eyes:  Conjunctivae/corneas clear. Ears:  Normal TMs and external ear canals both ears. Nose: Nares normal.    Mouth/Throat: Lips, mucosa, and tongue normal.    Neck:  no JVD. Back:   deferred. Lungs:   Mild wheezing at lower lobes   Heart:  Regular rate and rhythm, S1, S2 normal   Abdomen:   Soft, non-tender. Morbidly obese   Extremities: 3+ edema with erythema to LE bilaterally and left UE. Pulses: 2+ and symmetric all extremities.    Skin: As above    Lymph nodes: Cervical, supraclavicular, and axillary nodes normal.   Neurologic: More relaxed today        Lab/Data Review:    Recent Results (from the past 24 hour(s))   PROTHROMBIN TIME + INR    Collection Time: 11/05/21  3:01 AM   Result Value Ref Range    Prothrombin time 16.8 (H) 12.6 - 14.5 sec    INR 1.3     LIPID PANEL    Collection Time: 11/05/21  3:01 AM   Result Value Ref Range    Cholesterol, total 135 MG/DL    Triglyceride 76 35 - 150 MG/DL    HDL Cholesterol 50 40 - 60 MG/DL    LDL, calculated 69.8 <100 MG/DL    VLDL, calculated 15.2 6.0 - 23.0 MG/DL    CHOL/HDL Ratio 2.7 <200         Assessment/ Plan:     Principal Problem:    Acute diastolic CHF (congestive heart failure) ([facility] (10/30/2021)    Active Problems:    GERD (gastroesophageal reflux disease) ()      Overview: EGD October 2016 showed an irregular Z line, and mild diffuse gastritis       and there are biopsy reports under pathology--Dr. Kizzy Shook      Hyperlipidemia ()      Fibromyalgia ()      Anxiety () Essential hypertension, benign ()      Morbid obesity (HCC) ()      COPD (chronic obstructive pulmonary disease) ([facility]) ()      Acute exacerbation of CHF (congestive heart failure) ([facility] (10/29/2021)      Chronic hypoxemic respiratory failure ([address] (10/29/2021)      Anasarca (10/29/2021)      Cirrhosis ([facility] (11/1/2021)      Hyperkalemia (11/2/2021)    Acute diastolic CHF (congestive heart failure) ([facility] (10/30/2021)  Bumex 1mg IV BID  Spironolactone 50mg   Monitor kidney function tomorrow    Mild hyperkalemia  S/p Lokelma. K improved     Hypertension  Losartan has been held secondary to mild decrease in kidney function after Lasix. Kidney functions improving.         COPD exacerbation (chronic obstructive pulmonary disease) ([facility]) ()   Solu-Medrol 40 every 8hr that I will reduce to q12hr  Continue nebs  Has bilateral wheezing, but improved from yesterday       Chronic hypoxemic respiratory failure ([facility] (10/29/2021)  On 4 L/min at home, currently on 7L NC       Anasarca (10/29/2021)  secondary to CHF and cirrhosis, slightly improved. On bumex and spironolactone.      Morbid obesity  BMI 46.85     Anxiety/depression  Continue Wellbutrin, Cymbalta     Hyperlipidemia  Continue home medication    Cirrhosis   GI following    UA from 10/30 with possible early UTI, Ceftriaxone given, so want to see if this will help with her mentation. Ordered urine culture 11/3 that is pending. Does admit to day time sleepiness, snoring, and she is morbidly obese. Will order overnight pulse oximeter on her baseline of 4L NC to see if any desaturations, if so, will need outpatient formal sleep study to ensure no BELLE.  I have discussed with respiratory therapy    Transfer to medical floor     DVT prophylaxis - Lovenox  Signed By: Tate Harmon DO     November 5, 2021

## 2021-11-05 NOTE — PROGRESS NOTES
Problem: Mobility Impaired (Adult and Pediatric)  Goal: *Acute Goals and Plan of Care (Insert Text)  Outcome: Progressing Towards Goal  Note:     GOALS MODIFIED BASE ON CURRENT FUNCTIONAL LEVEL 11/5/21  (1.)Ms. Beryl Main will move from supine to sit and sit to supine  in bed with CONTACT GUARD ASSIST.  (2.)Ms. Beryl Main will transfer from bed to chair and chair to bed with SBA using a walker   (3.)Ms. Whitaker will ambulate with SBA using a rolling walker. ________________________________________________________________________________________________        PHYSICAL THERAPY: Daily Note, Re-evaluation and AM 11/5/2021  INPATIENT: PT Visit Days : 1  Payor: Masoud Pruitt / Plan: [address] [place] [address] HMO / Product Type: [employer] /       NAME/AGE/GENDER: Cherri Akhtar is a 77 y.o. female   PRIMARY DIAGNOSIS: Acute exacerbation of CHF (congestive heart failure) ([facility]) [B09.7] Acute diastolic CHF (congestive heart failure) ([facility]) Acute diastolic CH[facility]congestive heart failure) ([facility]       ICD-10: Treatment Diagnosis:    · Generalized Muscle Weakness (M62.81)  · Difficulty in walking, Not elsewhere classified (R26.2)   Precaution/Allergies:  Patient has no known allergies. ASSESSMENT:     Ms. Cordoba Plan has experienced a functional decline, pt is not ambulating functional distances, goals modified today. May have to consider medical transport home due to pt having 6 steps to climb at entrance. Pt reportedly has a DC plan to home with caregivers but will need HHPT & OT follow up. This section established at most recent assessment   PROBLEM LIST (Impairments causing functional limitations):  1. Decreased Strength  2. Decreased Transfer Abilities  3. Decreased Ambulation Ability/Technique  4. Decreased Balance  5. Decreased Activity Tolerance   INTERVENTIONS PLANNED: (Benefits and precautions of physical therapy have been discussed with the patient.)  1. Bed Mobility  2. Gait Training  3.  Therapeutic Exercise/Strengthening  4. Transfer Training     TREATMENT PLAN: Frequency/Duration: daily for duration of hospital stay  Rehabilitation Potential For Stated Goals: [facility] (at time of discharge pending progress):    Placement: It is my opinion, based on this patient's performance to date, that Ms. Ioana Cruz may benefit from [address] after discharge due to the functional deficits listed above that are likely to improve with skilled rehabilitation because he/she has multiple medical issues that affect his/her functional mobility in the community. Equipment:    None at this time              HISTORY:   History of Present Injury/Illness (Reason for Referral):  [place] is a 77 y.o. female with medical history of chronic hypoxemic respiratory failure (4L via NC at baseline), COPD with ongoing tobacco abuse, HTN, chronic back pain, chronic edema with probable CHF who presented with increasing dyspnea and edema. She was evaluated in the ER 10/25/2021 for the same presentation and noted to have increased symptoms > 2 weeks. Pt has underlying CHF (no echo on record) and admits to compliance with lasix. BNP was elevated 522 with trop 33.3 though cxray without gross abnl. Pt was given IV lasix and then dc'd from the ER with a 3-day prescription of lasix though daughter reports she already had the same prescription at home. Over the past couple of days, pt has had increased orthopnea, MONTELONGO, edema / anasarca and admits to increased abdominal girth. She presented back to the ER today and cxray now revealed increased pulm vascular markings with BNP slightly increased to 549 though troponin levels improved at 19.2. Given her worsening pulmonary symptoms worsened by her underlying COPD which is now in exacerbation, Hospitalist consulted by ER for inpatient admission.       Of significance pt denies hx of CHF and in her family hx, only cardiac hx is hypertension in her mother and MI in her paternal grandfather. Past Medical History/Comorbidities:   Ms. Barb Morataya  has a past medical history of Abnormal glucose level, Anxiety, Back pain, Chronic pain disorder, COPD (chronic obstructive pulmonary disease) ([address] Depression, Elevated blood sugar, Elevated IOP, Essential hypertension, benign, Fatigue, Fibromyalgia, Fracture of distal fibula, GERD (gastroesophageal reflux disease), Ground glass opacity present on imaging of lung, Hyperlipidemia, Hypokalemia, Menopausal problem, Metabolic syndrome, Nausea, Obesity, Osteopenia, and URI (upper respiratory infection). Ms. Barb Morataya  has a past surgical history that includes hx cholecystectomy; hx hysterectomy; hx tonsillectomy; and colonoscopy (N/A, 10/13/2016). Social History/Living Environment:   Home Environment: Patient room  # Steps to Enter: 6  Rails to Enter: Yes  One/Two Story Residence: One story  Living Alone: No  Support Systems: Child(luciana) (daughter)  Patient Expects to be Discharged to[de-identified] House  Current DME Used/Available at Home: Bryan Sample, rollator, Tub transfer bench  Tub or Shower Type: Tub/Shower combination  Prior Level of Function/Work/Activity:  Independent with rollator at home. Lives with her daughter     Number of Personal Factors/Comorbidities that affect the Plan of Care: 0: LOW COMPLEXITY   EXAMINATION:   Most Recent Physical Functioning:   Gross Assessment: 3-/5 throughout                       Balance:  Sitting: Intact; Without support  Standing: Impaired;  With support (walker) Bed Mobility:  Supine to Sit:  (NT)  Sit to Supine:  (NT)  Scooting: Contact guard assistance       Transfers:  Sit to Stand: Contact guard assistance  Stand to Sit: Contact guard assistance  Bed to Chair: Contact guard assistance (with walker)  Duration: 24 Minutes (extra time to work through )  Gait:     Speed/Zari: Delayed  Gait Abnormalities: Decreased step clearance  Distance (ft): 10 Feet (ft)  Assistive Device: Walker, rolling  Ambulation - Level of Assistance: Contact guard assistance  Interventions: Safety awareness training; Verbal cues      Body Structures Involved:  1. Muscles Body Functions Affected:  1. Movement Related Activities and Participation Affected:  1. Mobility   Number of elements that affect the Plan of Care: 3: MODERATE COMPLEXITY   CLINICAL PRESENTATION:   Presentation: Stable and uncomplicated: LOW COMPLEXITY   CLINICAL DECISION MAKING:   [facility] AM-PAC 6 Clicks   Basic Mobility Inpatient Short Form  How much difficulty does the patient currently have. .. Unable A Lot A Little None   1. Turning over in bed (including adjusting bedclothes, sheets and blankets)? [] 1   [] 2   [x] 3   [] 4   2. Sitting down on and standing up from a chair with arms ( e.g., wheelchair, bedside commode, etc.)   [] 1   [] 2   [x] 3   [] 4   3. Moving from lying on back to sitting on the side of the bed? [] 1   [] 2   [x] 3   [] 4   How much help from another person does the patient currently need. .. Total A Lot A Little None   4. Moving to and from a bed to a chair (including a wheelchair)? [] 1   [] 2   [x] 3   [] 4   5. Need to walk in hospital room? [] 1   [] 2   [x] 3   [] 4   6. Climbing 3-5 steps with a railing? [] 1   [] 2   [x] 3   [] 4   © 2007, Trustees of [facility], under license to [employer]. All rights reserved      Score:  Initial: 18 Most Recent: X (Date: -- )    Interpretation of Tool:  Represents activities that are increasingly more difficult (i.e. Bed mobility, Transfers, Gait). Medical Necessity:     · Patient is expected to demonstrate progress in   · strength, balance, and functional technique  ·  to   · increase independence with gait and transfers  · . Reason for Services/Other Comments:  · Patient continues to require skilled intervention due to   · Limited functional independence  · .    Use of outcome tool(s) and clinical judgement create a POC that gives a: Clear prediction of patient's progress: LOW COMPLEXITY            TREATMENT:   (In addition to Assessment/Re-Assessment sessions the following treatments were rendered)   Pre-treatment Symptoms/Complaints:  Complaints of pain from not moving  Pain: Initial:   Pain Intensity 1: 0  Post Session:     Therapeutic Activity: (  24 Minutes (extra time to work through ) ):  Therapeutic activities including sit <>stand, standing balance activity with walker (wt-shift & marching in place), short distance ambulation with rolling walker, LE AROM cool down to improve mobility, strength, balance, coordination and dynamic movement of arm - bilateral, leg - bilateral and core to improve functional endirance & stability. Braces/Orthotics/Lines/Etc:   · IV  · mckoy catheter  · O2 Device: Nasal cannula  Treatment/Session Assessment:    · Response to Treatment:  Additional time to gait very short distance, pt is non-functional currently, 02 sats were % throughout despite complaining that she could not get air, RN made aware. · Interdisciplinary Collaboration:   o Registered Nurse  o   · After treatment position/precautions:   o Up in chair  o Bed/Chair-wheels locked  o Call light within reach  o RN notified   · Compliance with Program/Exercises: Will assess as treatment progresses  · Recommendations/Intent for next treatment session: \"Next visit will focus on reduction in assistance provided\".   Total Treatment Duration:  PT Patient Time In/Time Out  Time In: 1036  Time Out: 1100    Tomasa Landry PT

## 2021-11-05 NOTE — CONSULTS
C/ Isra Bueno ENCOUNTER :  2021    Date of Admission:  10/29/2021  Length of Stay: 7 days     The patient's chart has been reviewed and the chart has been discussed with nursing staff. Subjective: This patient has been seen and evaluated at the request of Dr. Estephanie Hernandez for Hx COPD and possibly OHS. Patient is a 77 y.o.  female presented on 11/3 with hypoxemia and gross anasarca. She is a current smoker with documented COPD noted below from , chronic respiratory failure, in the setting of severe morbid obesity (BMI 46) with diastolic heart failure, new cirrhosis diagnosis, chronic pin syndrome and HTN. She is normally on 4 lpm NC at home. She was admitted and treatment for AECOPD was initiated. She was seen by Cardiology and diuretics were initiated; she is 7.8 liters net negative since admission. Echocardiogram showed preserved EF and normal RV size. ABG was done on NC on 10/29 showing pH 7.42, pCO2 51, pO2 79 HCO3 33.1, BMP CO2 32 on admission. CXR without acute findings. She was seen by GI here and we were consulted to evaluate for COPD with possible OHS. Daughter is at bedside and provides a lot of history as she is awake and answers questions, but minimizes symptoms. She remains grossly volume overloaded, but back to 4L NC and breathing feels better. Admits to snoring and frequent daytime naps, but never had BELLE testing. She last smoked a week ago prior to admission and doesn't use any COPD medications regularly.      Past Surgical History:   Procedure Laterality Date    COLONOSCOPY N/A 10/13/2016    COLONOSCOPY performed by Hillary Pillai MD at [facility] ENDOSCOPY    HX CHOLECYSTECTOMY      HX HYSTERECTOMY      HX TONSILLECTOMY        Social History     Tobacco Use    Smoking status: Former Smoker     Packs/day: 3.00     Years: 40.00     Pack years: 120.00     Quit date: 2015     Years since quittin.8    Smokeless tobacco: Never Used   Substance Use Topics    Alcohol use: No      Family History   Problem Relation Age of Onset    Breast Cancer Paternal Grandmother     Other Mother         aneurysm    Hypertension Mother     Cancer Paternal Aunt         brain    Cancer Paternal Uncle         mouth    Heart Disease Paternal Grandfather     Heart Attack Paternal Grandfather       No Known Allergies   Prior to Admission Medications   Prescriptions Last Dose Informant Patient Reported? Taking? ALPRAZolam (XANAX) 2 mg tablet Unknown at Unknown time  No No   Sig: Take 1 Tab by mouth two (2) times daily as needed for Anxiety. Max Daily Amount: 4 mg. Blood-Glucose Meter (ACCU-CHEK LASHELL PLUS METER) misc Unknown at Unknown time  No No   Sig: Use meter two times daily to check blood sugars. DX: E11.9 Diabetes   DULoxetine (CYMBALTA) 60 mg capsule Unknown at Unknown time  No No   Sig: Take 1 Cap by mouth daily. Take 1 cap PO at bedtime    Indications: FIBROMYALGIA   HYDROcodone-acetaminophen (NORCO)  mg tablet Unknown at Unknown time  No No   Sig: Take 2 Tabs by mouth three (3) times daily. Max Daily Amount: 6 Tabs. Lancets (ACCU-CHEK SOFTCLIX LANCETS) misc Unknown at Unknown time  No No   Si lancet two times daily. DX: E11.9 Diabetes   albuterol (PROVENTIL VENTOLIN) 2.5 mg /3 mL (0.083 %) nebu Unknown at Unknown time  No No   Sig: 3 mL by Nebulization route every six (6) hours as needed for Wheezing or Shortness of Breath. albuterol (VENTOLIN HFA) 90 mcg/actuation inhaler Unknown at Unknown time  No No   Sig: Take 2 Puffs by inhalation every four (4) hours as needed for Wheezing. aspirin delayed-release 81 mg tablet Unknown at Unknown time  Yes No   Sig: Take 81 mg by mouth daily. buPROPion SR (WELLBUTRIN SR) 150 mg SR tablet Unknown at Unknown time  No No   Sig: Take 1 Tab by mouth two (2) times a day. calcium citrate-vitamin D3 (CITRACAL + D) tablet Unknown at Unknown time  Yes No   Sig: Take 2 Tabs by mouth nightly. ezetimibe-simvastatin (VYTORIN 10/20) 10-20 mg per tablet Unknown at Unknown time  No No   Sig: Take 1 Tab by mouth nightly. Indications: to lower cholesterol   furosemide (LASIX) 20 mg tablet Unknown at Unknown time  No No   Sig: Take 1 tablet daily for the next 3 days. gabapentin (NEURONTIN) 600 mg tablet   No No   Sig: Take 1 Tab by mouth nightly. Indications: NEUROPATHIC PAIN   glucose blood VI test strips (ACCU-CHEK LASHELL PLUS TEST STRP) strip Unknown at Unknown time  No No   Si strip two times daily. DX E11.9 Diabetes   losartan-hydroCHLOROthiazide (HYZAAR) 50-12.5 mg per tablet Unknown at Unknown time  No No   Sig: Take 1 Tab by mouth daily. Indications: hypertension, To lower blood pressure / Fluid pill   magnesium oxide 500 mg tab Unknown at Unknown time  Yes No   Sig: Take  by mouth.   miSOPROStol (CYTOTEC) 200 mcg tablet Unknown at Unknown time  No No   Sig: Take 1 Tab by mouth two (2) times a day. To coat stomach.   mv,Ca,min-folic acid-vit K1 (ONE-A-DAY WOMEN'S 50 PLUS) 400-20 mcg tab Unknown at Unknown time  No No   Sig: Take 1 Tab by mouth daily. naloxone (NARCAN) 4 mg/actuation nasal spray Unknown at Unknown time  No No   Si [place] by IntraNASal route once as needed for up to 2 doses. As directed; may repeat every 2-3 mins until ems arrives or patient responds  Indications: OPIATE-INDUCED RESPIRATORY DEPRESSION, If stops breathing from too much pain meds   omeprazole (PRILOSEC) 40 mg capsule Unknown at Unknown time  No No   Sig: Take 1 Cap by mouth daily. 30 min before breakfast  Indications: HEARTBURN, To lower stomach acid   potassium chloride (KLOR-CON M10) 10 mEq tablet Unknown at Unknown time  No No   Sig: Take 1 Tab by mouth daily.    predniSONE (DELTASONE) 20 mg tablet Unknown at Unknown time  No No   Sig: 3 PO Day 1-2  2 PO Day 3-4  1 PO day 5-6  1/2 PO day 7-8   promethazine (PHENERGAN) 25 mg tablet Unknown at Unknown time  No No   Sig: Take 1 Tab by mouth every six (6) hours as needed for Nausea. travoprost (TRAVATAN Z) 0.004 % ophthalmic solution Unknown at Unknown time  Yes No   Sig: Administer 1 Drop to both eyes every evening. umeclidinium-vilanterol (ANORO ELLIPTA) 62.5-25 mcg/actuation inhaler Unknown at Unknown time  No No   Sig: Take 1 Puff by inhalation daily. ziprasidone (GEODON) 20 mg capsule Unknown at Unknown time  No No   Sig: Take 2 Caps by mouth nightly.  Indications: Depression      Facility-Administered Medications: None       MEDS SCHEDULED:    Current Facility-Administered Medications   Medication Dose Route Frequency    cefTRIAXone (ROCEPHIN) 1 g in 0.9% sodium chloride (MBP/ADV) 50 mL MBP  1 g IntraVENous Q24H    methylPREDNISolone (PF) (SOLU-MEDROL) injection 40 mg  40 mg IntraVENous Q12H    ALPRAZolam (XANAX) tablet 1 mg  1 mg Oral TID PRN    spironolactone (ALDACTONE) tablet 50 mg  50 mg Oral DAILY    bumetanide (BUMEX) injection 1 mg  1 mg IntraVENous BID    nystatin (MYCOSTATIN) 100,000 unit/gram powder   Topical BID    aspirin delayed-release tablet 81 mg  81 mg Oral DAILY    buPROPion SR (WELLBUTRIN SR) tablet 150 mg  150 mg Oral BID    DULoxetine (CYMBALTA) capsule 60 mg  60 mg Oral DAILY    ezetimibe/atorvastatin 10/10 mg   Oral DAILY    [Held by provider] gabapentin (NEURONTIN) capsule 600 mg  600 mg Oral QHS    latanoprost (XALATAN) 0.005 % ophthalmic solution 1 Drop  1 Drop Both Eyes QHS    sodium chloride (NS) flush 5-40 mL  5-40 mL IntraVENous Q8H    sodium chloride (NS) flush 5-40 mL  5-40 mL IntraVENous PRN    acetaminophen (TYLENOL) tablet 650 mg  650 mg Oral Q6H PRN    Or    acetaminophen (TYLENOL) suppository 650 mg  650 mg Rectal Q6H PRN    polyethylene glycol (MIRALAX) packet 17 g  17 g Oral DAILY PRN    ondansetron (ZOFRAN ODT) tablet 4 mg  4 mg Oral Q8H PRN    Or    ondansetron (ZOFRAN) injection 4 mg  4 mg IntraVENous Q6H PRN    enoxaparin (LOVENOX) injection 40 mg  40 mg SubCUTAneous Q12H    nicotine (NICODERM CQ) 14 mg/24 hr patch 1 Patch  1 Patch TransDERmal DAILY    pantoprazole (PROTONIX) tablet 40 mg  40 mg Oral ACB    arformoteroL (BROVANA) neb solution 15 mcg  15 mcg Nebulization BID RT    budesonide (PULMICORT) 500 mcg/2 ml nebulizer suspension  500 mcg Nebulization BID RT    albuterol-ipratropium (DUO-NEB) 2.5 MG-0.5 MG/3 ML  3 mL Nebulization Q4H RT    tiotropium bromide (SPIRIVA RESPIMAT) 2.5 mcg /actuation  2 Puff Inhalation DAILY    [Held by provider] losartan (COZAAR) tablet 50 mg  50 mg Oral DAILY    HYDROcodone-acetaminophen (NORCO)  mg tablet 1 Tablet  1 Tablet Oral Q6H PRN     Review of Systems  A comprehensive review of systems was negative except for that written in the HPI. Objective:     Vitals:    11/05/21 0734 11/05/21 0903 11/05/21 1044 11/05/21 1203   BP:  (!) 133/59 (!) 152/68    Pulse:  96 92    Resp:   18    Temp:   97.9 °F (36.6 °C)    SpO2: 95%  100% 96%   Weight:       Height:         11/05 0701 - 11/05 1900  In: -   Out: 325 [Urine:325]  11/03 1901 - 11/05 0700  In: 780 [P.O.:480; I.V.:300]  Out: 3270 [Urine:3270]  PHYSICAL EXAM     Physical Exam:   General:  Alert, cooperative, no acute distress, appears stated age. Eyes:  Conjunctivae/corneas clear. Nose: Nares patent and moist.    Mouth/Throat: Lips, mucosa, and tongue pink and intact. Neck: Supple, symmetrical.   Respiratory:   Minimal wheezes to auscultation bilaterally on 4L NC   Cardiovascular:  Regular rate and rhythm, S1, S2, no murmur, click, rub or gallop. Telemetry monitor: sinus arrhythmia    GI:   Abdomen soft, non-tender. Bowel sounds active X 4 Q. No masses,     Musculoskeletal: Extremities symmetrical, atraumatic, no cyanosis, 3+edema. Skin: Skin color, texture, turgor normal. No rashes or lesions       Neurologic:  Alert and oriented. ACTIVITY: limited  NUTRITION: low sodium    IMAGES: Results from Hospital Encounter encounter on 07/23/21    CT CHEST PULMONARY EMBOLISM    Impression  1.  No acute infiltrate or evidence of pulmonary embolism. 2. Acute or subacute fractures in the anterolateral right 5-7th ribs. 3. Cirrhotic liver, with ascites and splenomegaly suggestive of portal  hypertension. No results found for this or any previous visit. Results from Hospital Encounter encounter on 10/29/21    ECHO ADULT COMPLETE    Interpretation Summary  · Image quality for this study was technically difficult. · Contrast used: DEFINITY. · LV: Estimated LVEF is 60 - 65%. Normal cavity size, wall thickness and systolic function (ejection fraction normal). Mild (grade 1) left ventricular diastolic dysfunction. · TV: Mild tricuspid valve regurgitation is present. Right Ventricular Arterial Pressure (RVSP) is 34 mmHg. · MV: Mild mitral valve regurgitation is present. · PV: Mild pulmonic valve regurgitation is present. · LA: Mildly dilated left atrium. · RA: Mildly dilated right atrium. LAB  Recent Labs     11/03/21  0629   WBC 6.0   HGB 8.0*   HCT 26.6*   *     Recent Labs     11/05/21  0301 11/04/21  0302 11/03/21  1706 11/03/21  0629 11/03/21  0629    136  --   --  133*   K 4.7 4.6 4.8   < > 5.3*   CL 97* 97*  --   --  96*   CO2 39* 39*  --   --  34*   * 114*  --   --  122*   BUN 38* 38*  --   --  43*   CREA 0.85 0.97  --   --  0.98   INR 1.3 1.3  --   --  1.3    < > = values in this interval not displayed. No results for input(s): LCAD, LAC in the last 72 hours. ABG:   No results for input(s): PH, PCO2, PO2, HCO3, PHI, PCO2I, PO2I, HCO3I, FIO2I in the last 72 hours. Cultures:   Recent Labs     11/03/21  1621   CULT >100,000 COLONIES/mL GRAM NEGATIVE RODS IDENTIFICATION AND SUSCEPTIBILITY TO FOLLOW*         Assessment/Plan:   Principal Problem:    Acute diastolic CHF (congestive heart failure) ([facility] [address] (10/30/2021)    Needs more diuresis, 3+ anasaraca      Morbid obesity (HCC) ()  Chronic, needs weight loss. Major contributor to overall poor outcome.       COPD (chronic obstructive pulmonary disease) (HCC) ()    Smoking cessation, would discharge on Pulmicort, Duonebs and can adjust in clinic. Will wean steroids to prednisone tomorrow AM and recommend 5 days 40mg. Chronic hypoxemic respiratory failure ([address] (10/29/2021)  On 4 lpm at home       Anasarca (10/29/2021)    Ongoing diuresis. Cirrhosis ([address] (11/1/2021)  GI following      Hypercapnia (11/5/2021)  With super morbid obesity, no current spirometry with LT tobacco abuse and chronic pain. Will need outpatient sleep study. Sent message to clinic for follow up in Pulmonary in 2-4 weeks and PSG when able. Her POC2 has been 50-51 and wouldn't qualify for NIV based on ABG alone. Could trial APAP qHS for now. Tobacco abuse (11/5/2021)    Cessation recommended. UTI  On rocephin    Prophylaxis: PPI, DVT  DNR  Chris Braxton NP-C    More than 50% of time documented was spent in face-to-face contact with the patient and in the care of the patient on the floor/unit where the patient is located. I have spoken with and examined the patient. I agree with the above assessment and plan as documented. Gen: alert, responsive  Lungs:  Minimal wheezing  Heart:  RRR with no Murmur/Rubs/Gallops  Abd: obese  Ext: 3+ anasarca    78 y/o dHF, cirrhosis, COPD, obesity who needs ongoing diuresis, continue nebs, wean steroids. Will need Pulmicort/Duonebs at discharge and will need outpatient sleep study. Will try APAP for now QHS. Will see again on Monday if still admitted.      Corrie Damian MD

## 2021-11-05 NOTE — PROGRESS NOTES
Care Management Interventions  PCP Verified by CM: Yes (Dr. Aponte)  Mode of Transport at Discharge:  (family)  Transition of Care Consult (CM Consult): Home Health (Pt current w/ [facility])  [address]: No  Reason Outside [place]: Patient already serviced by other home care/hospice agency  Support Systems: Other Family Member(s) (sisters)  Confirm Follow Up Transport: Family  The Patient and/or Patient Representative was Provided with a Choice of Provider and Agrees with the Discharge Plan?: Yes  Freedom of Choice List was Provided with Basic Dialogue that Supports the Patient's Individualized Plan of Care/Goals, Treatment Preferences and Shares the Quality Data Associated with the Providers?: Yes  Discharge Location  Discharge Placement: Home with home health  Saw pt in interdisciplinarily rounds with the following disciplines: Physician, Case Management, Nursing, Dietary,Therapy and Pharmacy. The plan of care was discussed along with goals for discharge date/ location. Per MD pt may transfer to m/s bed today, strict I&O, will have a overnight pulse ox and possible d/c in 2 days. Spoke with pt and her sisters, pt is current with [facility] and would like to resume upon d/c., she has PT/Ot/RN services.

## 2021-11-05 NOTE — PROGRESS NOTES
[facility] Progress Note         Admit Date:  10/29/2021    Today's Date:  11/5/2021    CC:  Cirrhosis     Subjective:     Patient transferred to ICU dt increased effort breathing and AMS, improved and may transfer pend to medical floor today. Appears improved from respiratory and fluid overload standpoint. She is tolerating bumex 1mg BID and spironolactone 50mg daily without any electrolyte issues.   Sitting up in chair  Paracentesis not successful sec to minimal fluid - pt has morbid obesity and anasarca instead of sig ascites    Medications:   Current Facility-Administered Medications   Medication Dose Route Frequency    cefTRIAXone (ROCEPHIN) 1 g in 0.9% sodium chloride (MBP/ADV) 50 mL MBP  1 g IntraVENous Q24H    methylPREDNISolone (PF) (SOLU-MEDROL) injection 40 mg  40 mg IntraVENous Q12H    ALPRAZolam (XANAX) tablet 1 mg  1 mg Oral TID PRN    spironolactone (ALDACTONE) tablet 50 mg  50 mg Oral DAILY    bumetanide (BUMEX) injection 1 mg  1 mg IntraVENous BID    nystatin (MYCOSTATIN) 100,000 unit/gram powder   Topical BID    aspirin delayed-release tablet 81 mg  81 mg Oral DAILY    buPROPion SR (WELLBUTRIN SR) tablet 150 mg  150 mg Oral BID    DULoxetine (CYMBALTA) capsule 60 mg  60 mg Oral DAILY    ezetimibe/atorvastatin 10/10 mg   Oral DAILY    [Held by provider] gabapentin (NEURONTIN) capsule 600 mg  600 mg Oral QHS    latanoprost (XALATAN) 0.005 % ophthalmic solution 1 Drop  1 Drop Both Eyes QHS    sodium chloride (NS) flush 5-40 mL  5-40 mL IntraVENous Q8H    sodium chloride (NS) flush 5-40 mL  5-40 mL IntraVENous PRN    acetaminophen (TYLENOL) tablet 650 mg  650 mg Oral Q6H PRN    Or    acetaminophen (TYLENOL) suppository 650 mg  650 mg Rectal Q6H PRN    polyethylene glycol (MIRALAX) packet 17 g  17 g Oral DAILY PRN    ondansetron (ZOFRAN ODT) tablet 4 mg  4 mg Oral Q8H PRN    Or    ondansetron (ZOFRAN) injection 4 mg  4 mg IntraVENous Q6H PRN    enoxaparin (LOVENOX) injection 40 mg  40 mg SubCUTAneous Q12H    nicotine (NICODERM CQ) 14 mg/24 hr patch 1 Patch  1 Patch TransDERmal DAILY    pantoprazole (PROTONIX) tablet 40 mg  40 mg Oral ACB    arformoteroL (BROVANA) neb solution 15 mcg  15 mcg Nebulization BID RT    budesonide (PULMICORT) 500 mcg/2 ml nebulizer suspension  500 mcg Nebulization BID RT    albuterol-ipratropium (DUO-NEB) 2.5 MG-0.5 MG/3 ML  3 mL Nebulization Q4H RT    tiotropium bromide (SPIRIVA RESPIMAT) 2.5 mcg /actuation  2 Puff Inhalation DAILY    [Held by provider] losartan (COZAAR) tablet 50 mg  50 mg Oral DAILY    HYDROcodone-acetaminophen (NORCO)  mg tablet 1 Tablet  1 Tablet Oral Q6H PRN       Review of Systems:  Unable to obtain    Diet:  Low Na    Objective:   Vitals:  Visit Vitals  BP (!) 133/59   Pulse 96   Temp 98.3 °F (36.8 °C)   Resp 18   Ht 5' 3\" (1.6 m)   Wt 118.2 kg (260 lb 8 oz)   SpO2 95%   BMI 46.15 kg/m²     Intake/Output:  11/05 0701 - 11/05 1900  In: -   Out: 325 [Urine:325]  11/03 1901 - 11/05 0700  In: 780 [P.O.:480; I.V.:300]  Out: 3270 [Urine:3270]  Exam:  General appearance: awake in chair on nasal O2  Lungs: course auscultation bilaterally anteriorly  Heart: regular rate and rhythm  Abdomen: distended, non-tender. Hypoactive bowel sounds normal. No masses, no organomegaly.  Dependent edema  Extremities: +3 edema, redness bilat  Neuro:  Alert    Data Review (Labs):    Recent Labs     11/05/21  0301 11/04/21  0302 11/03/21  1706 11/03/21  0629 11/02/21  1842   WBC  --   --   --  6.0  --    HGB  --   --   --  8.0*  --    HCT  --   --   --  26.6*  --    PLT  --   --   --  134*  --    MCV  --   --   --  94.0  --     136  --  133*  --    K 4.7 4.6 4.8 5.3* 5.0   CL 97* 97*  --  96*  --    CO2 39* 39*  --  34*  --    BUN 38* 38*  --  43*  --    CREA 0.85 0.97  --  0.98  --    CA 8.8 9.0  --  9.3  --    * 114*  --  122*  --    AP  --  92  --  98  --    AST  --  31  --  47*  --    ALT  --  40  --  45  --    TBILI  -- 0.9  --  1.0  --    ALB  --  3.1*  --  3.1*  --    TP  --  6.6  --  6.9  --    PTP 16.8* 16.8*  --  16.4*  --    INR 1.3 1.3  --  1.3  --         Colonoscopy by Dr. César Henao on 10/13/2016 revealed [address], tubular adenoma in the transverse and sigmoid colon.      EGD on 10/13/2016 by Dr. César Henao revealed gastritis, irregular Z line, but negative pathology for Dave's esophagus     EXAM: US ABD COMP 10/31/2021  FINDINGS:   This examination was technically difficult secondary to the patient's large body  habitus and anasarca. Aorta/IVC: Visualized portions are within normal limits. Pancreas: Visualized portions are within normal limits. Liver: Shows a coarsened nodular echotexture. No discrete hepatic masses were  noted. Gallbladder: The patient is status post cholecystectomy. Sonographic Villagomez sign  was negative. Portal vein: The portal vein shows hepatopedal flow. CBD: Normal in caliber and measures 5.2 mm. Spleen: The spleen measures 10.8  cm. There are no discrete masses. Right kidney: 10.8 cm in length and without evidence of obstruction. Left kidney: 11.4 cm in length and without evidence of obstruction. IMPRESSION  The liver shows a coarsened, nodular echotexture. This appearance is consistent  with cirrhosis. The patient is status post cholecystectomy. Visualization of the pancreas was limited. Visualized portions showed no obvious  masses. Other findings as above.        EXAM: CT Chest with IV contrast - PE protocol. 7/23/2021  FINDINGS:  - Pleura/pericardium: Within normal limits. - Lungs: Mild areas of atelectasis or scarring are seen in both lung bases. A  few tiny bilateral lung nodules are unchanged. No acute infiltrate is seen. - Shayna/Mediastinum: Within normal limits. - Tracheobronchial tree: Within normal limits. - Aorta/pulmonary arteries: Within normal limits. - Heart: Within normal limits. - Coronary arteries: No coronary artery calcifications. - Chest wall:  Within normal limits. - Spine/bones: There are nondisplaced acute or subacute fractures in the  anterolateral right fifth, sixth and seventh ribs. - Additional comments: Scans to the upper abdomen demonstrate a cirrhotic liver,  ascites and mild splenomegaly. IMPRESSION  1. No acute infiltrate or evidence of pulmonary embolism. 2. Acute or subacute fractures in the anterolateral right 5-7th ribs. 3. Cirrhotic liver, with ascites and splenomegaly suggestive of portal  hypertension. Assessment:     Principal Problem:    Acute diastolic CHF (congestive heart failure) ([address] (10/30/2021)    Active Problems:    GERD (gastroesophageal reflux disease) ()      Overview: EGD October 2016 showed an irregular Z line, and mild diffuse gastritis       and there are biopsy reports under pathology--Dr. Mellisa Kowalski      Hyperlipidemia ()      Fibromyalgia ()      Anxiety ()      Essential hypertension, benign ()      Morbid obesity (HCC) ()      COPD (chronic obstructive pulmonary disease) (HCC) ()      Acute exacerbation of CHF (congestive heart failure) ([address] (10/29/2021)      Chronic hypoxemic respiratory failure ([address] (10/29/2021)      Anasarca (10/29/2021)      Cirrhosis ([address] (11/1/2021)      Hyperkalemia (11/2/2021)      77 y.o. female with PMH including but not limited to, COPD with chronic resp failure on 4L NC, HTN, chronic back pain, CHF (EF 60-65% on 10/29/2021),HLD, GERD, Depression, Fibromyalgia and metabolic syndrome who is seen in consultation at the request of Dr. Dawna Singh for cirrhosis. She was admitted on 10/29/2021 for exacerbation of her COPD with gross anasarca. Cardiology is following who recommended GI evaluation for cirrhosis. She has associated anasarca, thrombocytopenia and portal hypertension on recent imaging. Hepatitis panel pending. MELD Na on 10/29/2021 is 7.  ECHO showing grade 1 diastolic dysfunction with EF 60%    Plan:     -Cirrhosis likely secondary to TANG with HLD and obesity, but hepatic congestion also likely playing a role with known CHF. Her prognosis is poor regardless of etiology and there is no reversal of disease  -Agree with diuresis, on Lasix 40 mg IB BID, continue Aldactone 50 m daily.   Having good urinary output   -Diagnostic paracentesis could not safely be performed to calculate SAAG score (mot enough fluid and bowel in the way)  -Agree with 2gm sodium diet  -AFP not elevated at 2.4  -Viral hepatitis panel negative  - UTI on antibiotics  - On 4 liter of O2 at home  - To transfer to medical floor today    We will sign off and set up OV in 2 wks    Kristie Mcknight MD  [facility]

## 2021-11-05 NOTE — PROGRESS NOTES
[facility] CARDIOLOGY PROGRESS NOTE           11/5/2021 10:18 AM    Admit Date: 10/29/2021      Subjective:   Patient states she is still short of breath, worse with activity or lying down. Reports her lower extremity may be improving. She is hungry, but otherwise without other acute complaints. No chest pain, palpitations, nausea, vomiting. ROS:  Cardiovascular:  As noted above    Objective:      Vitals:    11/05/21 0412 11/05/21 0705 11/05/21 0734 11/05/21 0903   BP:  (!) 149/55  (!) 133/59   Pulse:  100  96   Resp:  18     Temp:  98.3 °F (36.8 °C)     SpO2: 98% 97% 95%    Weight:  260 lb 8 oz (118.2 kg)     Height:           Physical Exam:  General-No Acute Distress, morbidly obese female, awake, interactive  Neck- supple, limited visualization of neck veins unable to assess JVD  CV- regular rate and rhythm no MRG  Lung-distant lung sounds, wheezes with expiration bilaterally  Abd- soft, nontender, nondistended  Ext-mild leg edema bilaterally. Skin- warm and dry    Data Review:   Recent Labs     11/05/21  0301 11/04/21  0302 11/03/21  1706 11/03/21  0629    136  --  133*   K 4.7 4.6   < > 5.3*   BUN 38* 38*  --  43*   CREA 0.85 0.97  --  0.98   * 114*  --  122*   WBC  --   --   --  6.0   HGB  --   --   --  8.0*   HCT  --   --   --  26.6*   PLT  --   --   --  134*   INR 1.3 1.3  --  1.3   CHOL 135  --   --   --    LDLC 69.8  --   --   --    HDL 50  --   --   --     < > = values in this interval not displayed. Assessment/Plan:       Acute diastolic CHF (congestive heart failure) ([address] (10/30/2021)  -Likely predominant component of cirrhosis contributing to presentation.  -Echo with preserved EF and normal RV size and fn there is concern for diastolic dysfunction but overall, has preserved LV systolic function. -  - renal function stable  - negative 7 liters for admission     Active Problems:    Hyperlipidemia ()    - elevated LFTs on presentation    - The 10-year ASCVD risk score (Dian Walker, et al., 2013) is: 16.1%    - if LFTs remain within normal range start statin therapy given elevated ASCVD risk        Essential hypertension, benign ()   -Reasonably controlled at this point.    Morbid obesity (HCC) ()       Chronic hypoxemic respiratory failure ([facility] (10/29/2021)  - Likely multifactorial with obesity/hypoventilation/suspected underlying sleep apnea/diastolic dysfunction/third spacing and fluid retention from cirrhosis.   - wean oxygen as tolerated   - still on high levels O2 by NC today  - ongoing wheezing, ongoing treatment with nebulizers and steroids       COPD (chronic obstructive pulmonary disease) (HCC) ()  -on chronic 4L NC       Cirrhosis ([facility] [address] (11/1/2021)    Anasarca (10/29/2021)  - Thought to be secondary to TANG/obesity  - seen by GI  - no ascites at time of attempted paracentesis        Hyperkalemia (11/2/2021)  - closely monitor K levels if using aldactone   K normalized 4.7 today      Cesar Casanova DO  11/5/2021 10:18 AM

## 2021-11-06 LAB
ANION GAP SERPL CALC-SCNC: ABNORMAL MMOL/L (ref 7–16)
BUN SERPL-MCNC: 34 MG/DL (ref 8–23)
CALCIUM SERPL-MCNC: 8.7 MG/DL (ref 8.3–10.4)
CHLORIDE SERPL-SCNC: 97 MMOL/L (ref 98–107)
CO2 SERPL-SCNC: 43 MMOL/L (ref 21–32)
CREAT SERPL-MCNC: 0.79 MG/DL (ref 0.6–1)
ERYTHROCYTE [DISTWIDTH] IN BLOOD BY AUTOMATED COUNT: 15.8 % (ref 11.9–14.6)
GLUCOSE SERPL-MCNC: 124 MG/DL (ref 65–100)
HCT VFR BLD AUTO: 27.5 % (ref 35.8–46.3)
HGB BLD-MCNC: 8 G/DL (ref 11.7–15.4)
INR PPP: 1.4
MCH RBC QN AUTO: 27.7 PG (ref 26.1–32.9)
MCHC RBC AUTO-ENTMCNC: 29.1 G/DL (ref 31.4–35)
MCV RBC AUTO: 95.2 FL (ref 79.6–97.8)
NRBC # BLD: 0.03 K/UL (ref 0–0.2)
PLATELET # BLD AUTO: 114 K/UL (ref 150–450)
PMV BLD AUTO: 9.6 FL (ref 9.4–12.3)
POTASSIUM SERPL-SCNC: 4.7 MMOL/L (ref 3.5–5.1)
PROTHROMBIN TIME: 17.5 SEC (ref 12.6–14.5)
RBC # BLD AUTO: 2.89 M/UL (ref 4.05–5.2)
SODIUM SERPL-SCNC: 139 MMOL/L (ref 136–145)
WBC # BLD AUTO: 6.2 K/UL (ref 4.3–11.1)

## 2021-11-06 PROCEDURE — 74011250636 HC RX REV CODE- 250/636: Performed by: PHYSICIAN ASSISTANT

## 2021-11-06 PROCEDURE — 74011250637 HC RX REV CODE- 250/637: Performed by: FAMILY MEDICINE

## 2021-11-06 PROCEDURE — 74011250637 HC RX REV CODE- 250/637: Performed by: PHYSICIAN ASSISTANT

## 2021-11-06 PROCEDURE — 94640 AIRWAY INHALATION TREATMENT: CPT

## 2021-11-06 PROCEDURE — 74011636637 HC RX REV CODE- 636/637: Performed by: INTERNAL MEDICINE

## 2021-11-06 PROCEDURE — 74011000250 HC RX REV CODE- 250: Performed by: INTERNAL MEDICINE

## 2021-11-06 PROCEDURE — 80048 BASIC METABOLIC PNL TOTAL CA: CPT

## 2021-11-06 PROCEDURE — 65270000029 HC RM PRIVATE

## 2021-11-06 PROCEDURE — 99232 SBSQ HOSP IP/OBS MODERATE 35: CPT | Performed by: INTERNAL MEDICINE

## 2021-11-06 PROCEDURE — 94762 N-INVAS EAR/PLS OXIMTRY CONT: CPT

## 2021-11-06 PROCEDURE — 74011250637 HC RX REV CODE- 250/637: Performed by: INTERNAL MEDICINE

## 2021-11-06 PROCEDURE — 94760 N-INVAS EAR/PLS OXIMETRY 1: CPT

## 2021-11-06 PROCEDURE — 2709999900 HC NON-CHARGEABLE SUPPLY

## 2021-11-06 PROCEDURE — 74011000258 HC RX REV CODE- 258: Performed by: FAMILY MEDICINE

## 2021-11-06 PROCEDURE — 74011250636 HC RX REV CODE- 250/636: Performed by: FAMILY MEDICINE

## 2021-11-06 PROCEDURE — 74011000250 HC RX REV CODE- 250: Performed by: PHYSICIAN ASSISTANT

## 2021-11-06 PROCEDURE — 36415 COLL VENOUS BLD VENIPUNCTURE: CPT

## 2021-11-06 PROCEDURE — 85610 PROTHROMBIN TIME: CPT

## 2021-11-06 PROCEDURE — 77010033678 HC OXYGEN DAILY

## 2021-11-06 PROCEDURE — 85027 COMPLETE CBC AUTOMATED: CPT

## 2021-11-06 RX ORDER — CARBOXYMETHYLCELLULOSE SODIUM 10 MG/ML
1 GEL OPHTHALMIC AS NEEDED
Status: DISCONTINUED | OUTPATIENT
Start: 2021-11-06 | End: 2021-11-07 | Stop reason: HOSPADM

## 2021-11-06 RX ORDER — BUMETANIDE 1 MG/1
1 TABLET ORAL 2 TIMES DAILY
Status: DISCONTINUED | OUTPATIENT
Start: 2021-11-06 | End: 2021-11-07 | Stop reason: HOSPADM

## 2021-11-06 RX ORDER — BUMETANIDE 1 MG/1
1 TABLET ORAL 2 TIMES DAILY
Status: DISCONTINUED | OUTPATIENT
Start: 2021-11-06 | End: 2021-11-06

## 2021-11-06 RX ADMIN — ARFORMOTEROL TARTRATE 15 MCG: 15 SOLUTION RESPIRATORY (INHALATION) at 20:09

## 2021-11-06 RX ADMIN — ALPRAZOLAM 1 MG: 0.5 TABLET ORAL at 22:02

## 2021-11-06 RX ADMIN — IPRATROPIUM BROMIDE AND ALBUTEROL SULFATE 3 ML: .5; 3 SOLUTION RESPIRATORY (INHALATION) at 08:40

## 2021-11-06 RX ADMIN — IPRATROPIUM BROMIDE AND ALBUTEROL SULFATE 3 ML: .5; 3 SOLUTION RESPIRATORY (INHALATION) at 13:01

## 2021-11-06 RX ADMIN — IPRATROPIUM BROMIDE AND ALBUTEROL SULFATE 3 ML: .5; 3 SOLUTION RESPIRATORY (INHALATION) at 03:47

## 2021-11-06 RX ADMIN — Medication 81 MG: at 09:02

## 2021-11-06 RX ADMIN — BUMETANIDE 1 MG: 1 TABLET ORAL at 21:56

## 2021-11-06 RX ADMIN — ENOXAPARIN SODIUM 40 MG: 100 INJECTION SUBCUTANEOUS at 09:02

## 2021-11-06 RX ADMIN — ENOXAPARIN SODIUM 40 MG: 100 INJECTION SUBCUTANEOUS at 21:56

## 2021-11-06 RX ADMIN — SPIRONOLACTONE 50 MG: 25 TABLET ORAL at 09:02

## 2021-11-06 RX ADMIN — PANTOPRAZOLE SODIUM 40 MG: 40 TABLET, DELAYED RELEASE ORAL at 09:01

## 2021-11-06 RX ADMIN — BUMETANIDE 1 MG: 0.25 INJECTION INTRAMUSCULAR; INTRAVENOUS at 09:06

## 2021-11-06 RX ADMIN — Medication 10 ML: at 13:58

## 2021-11-06 RX ADMIN — TIOTROPIUM BROMIDE INHALATION SPRAY 2 PUFF: 3.12 SPRAY, METERED RESPIRATORY (INHALATION) at 08:42

## 2021-11-06 RX ADMIN — LATANOPROST 1 DROP: 50 SOLUTION OPHTHALMIC at 21:00

## 2021-11-06 RX ADMIN — ALPRAZOLAM 1 MG: 0.5 TABLET ORAL at 13:57

## 2021-11-06 RX ADMIN — BUPROPION HYDROCHLORIDE 150 MG: 150 TABLET, EXTENDED RELEASE ORAL at 17:17

## 2021-11-06 RX ADMIN — HYDROCODONE BITARTRATE AND ACETAMINOPHEN 1 TABLET: 10; 325 TABLET ORAL at 03:28

## 2021-11-06 RX ADMIN — BUPROPION HYDROCHLORIDE 150 MG: 150 TABLET, EXTENDED RELEASE ORAL at 09:02

## 2021-11-06 RX ADMIN — IPRATROPIUM BROMIDE AND ALBUTEROL SULFATE 3 ML: .5; 3 SOLUTION RESPIRATORY (INHALATION) at 15:55

## 2021-11-06 RX ADMIN — NYSTATIN: 100000 POWDER TOPICAL at 09:02

## 2021-11-06 RX ADMIN — NYSTATIN: 100000 POWDER TOPICAL at 17:17

## 2021-11-06 RX ADMIN — CEFTRIAXONE 1 G: 1 INJECTION, POWDER, FOR SOLUTION INTRAMUSCULAR; INTRAVENOUS at 09:09

## 2021-11-06 RX ADMIN — CARBOXYMETHYLCELLULOSE SODIUM 1 DROP: 10 GEL OPHTHALMIC at 17:26

## 2021-11-06 RX ADMIN — HYDROCODONE BITARTRATE AND ACETAMINOPHEN 1 TABLET: 10; 325 TABLET ORAL at 17:25

## 2021-11-06 RX ADMIN — IPRATROPIUM BROMIDE AND ALBUTEROL SULFATE 3 ML: .5; 3 SOLUTION RESPIRATORY (INHALATION) at 20:09

## 2021-11-06 RX ADMIN — ALPRAZOLAM 1 MG: 0.5 TABLET ORAL at 03:28

## 2021-11-06 RX ADMIN — BUDESONIDE 500 MCG: 0.5 INHALANT RESPIRATORY (INHALATION) at 08:40

## 2021-11-06 RX ADMIN — IPRATROPIUM BROMIDE AND ALBUTEROL SULFATE 3 ML: .5; 3 SOLUTION RESPIRATORY (INHALATION) at 23:24

## 2021-11-06 RX ADMIN — PREDNISONE 40 MG: 10 TABLET ORAL at 09:01

## 2021-11-06 RX ADMIN — BUDESONIDE 500 MCG: 0.5 INHALANT RESPIRATORY (INHALATION) at 20:09

## 2021-11-06 RX ADMIN — ARFORMOTEROL TARTRATE 15 MCG: 15 SOLUTION RESPIRATORY (INHALATION) at 08:40

## 2021-11-06 RX ADMIN — ATORVASTATIN CALCIUM: 10 TABLET, FILM COATED ORAL at 09:00

## 2021-11-06 RX ADMIN — DULOXETINE HYDROCHLORIDE 60 MG: 60 CAPSULE, DELAYED RELEASE ORAL at 09:02

## 2021-11-06 NOTE — PROGRESS NOTES
Shift assessment complete. Pt a/ox4 and resting in recliner. S1S2 heart sounds auscultated. Pt has trace edema in face, 3+ pitting edema in LUE, and 2+ pitting edema in LLE, RUE, and RLE. Coarse crackles auscultated with bilateral breath sounds. Nasal cannula 4 L O2. Auscultation and palpation to abd shows acites and active bowel sounds in all quadrants. IV site c/d/i, patent and capped. [place] site c/d/i, patent and draining clear, yellow urine. Bed low and locked, side rails x3, gripper socks on, and call light in reach. Encouraged to call for help if needed and pt verbalized understanding.

## 2021-11-06 NOTE — PROGRESS NOTES
Progress Note    Patient: eNgin Shah MRN: [id number]  SSN: [id number]    YOB: 1955  Age: 77 y.o. Sex: female      Admit Date: 10/29/2021    LOS: 8 days     Subjective:   F/U acute on chronic respiratory failure, cirrhosis, CHF exacerbation    \"65 y.o. female with medical history of chronic hypoxemic respiratory failure (4L via NC at baseline), COPD with ongoing tobacco abuse, HTN, chronic back pain, chronic edema with probable CHF who presented with increasing dyspnea and edema.  She was evaluated in the ER 10/25/2021 for the same presentation and noted to have increased symptoms > 2 weeks.  Pt has underlying CHF (no echo on record) and admits to compliance with lasix.  BNP was elevated 522 with trop 33.3 though cxray without gross abnl.  Pt was given IV lasix and then dc'd from the ER with a 3-day prescription of lasix though daughter reports she already had the same prescription at home.     Over the past couple of days, pt has had increased orthopnea, MONTELONGO, edema / anasarca and admits to increased abdominal girth.  She presented back to the ER today and cxray now revealed increased pulm vascular markings with BNP slightly increased to 549 though troponin levels improved at 19.2. Nolene Succasunna her worsening pulmonary symptoms worsened by her underlying COPD which is now in exacerbation, Hospitalist consulted by ER for inpatient admission. \" Cardiology following. Lasix started but was held over the past couple of days. Bumex then started on 11/3. Spironolactone also added. New diagnosis of cirrhosis. Plan was for paracentesis on 11/3 but not enough fluid to drain. ECHO showing grade 1 diastolic dysfunction with EF 60%    Hx of morbid obesity. Admits to snoring at night and day time sleepiness. Has never been evaluated for sleep apnea. Overnight pulse oximeter with no desaturations. Pulmonology has recommended formal sleep study outpatient. Weight loss encouraged. UTI also found. Urine culture gram negative rods. Zosyn given on 11/3-11/4 but changed to Ceftriaxone on 11/5. Is/Os - (-)11L, good urine output. No chest pain or SOB but does feel congested in her chest. Now down to 5L NC. Sleepy, but also was woken up at 3am to be transferred to medical floor. CO2 elevated, but likely contraction alkalosis.    Current Facility-Administered Medications   Medication Dose Route Frequency    carboxymethylcellulose sodium (REFRESH LIQUIGEL) 1 % ophthalmic solution 1 Drop  1 Drop Both Eyes PRN    cefTRIAXone (ROCEPHIN) 1 g in 0.9% sodium chloride (MBP/ADV) 50 mL MBP  1 g IntraVENous Q24H    predniSONE (DELTASONE) tablet 40 mg  40 mg Oral DAILY WITH BREAKFAST    ALPRAZolam (XANAX) tablet 1 mg  1 mg Oral TID PRN    spironolactone (ALDACTONE) tablet 50 mg  50 mg Oral DAILY    bumetanide (BUMEX) injection 1 mg  1 mg IntraVENous BID    nystatin (MYCOSTATIN) 100,000 unit/gram powder   Topical BID    aspirin delayed-release tablet 81 mg  81 mg Oral DAILY    buPROPion SR (WELLBUTRIN SR) tablet 150 mg  150 mg Oral BID    DULoxetine (CYMBALTA) capsule 60 mg  60 mg Oral DAILY    ezetimibe/atorvastatin 10/10 mg   Oral DAILY    [Held by provider] gabapentin (NEURONTIN) capsule 600 mg  600 mg Oral QHS    latanoprost (XALATAN) 0.005 % ophthalmic solution 1 Drop  1 Drop Both Eyes QHS    sodium chloride (NS) flush 5-40 mL  5-40 mL IntraVENous Q8H    sodium chloride (NS) flush 5-40 mL  5-40 mL IntraVENous PRN    acetaminophen (TYLENOL) tablet 650 mg  650 mg Oral Q6H PRN    Or    acetaminophen (TYLENOL) suppository 650 mg  650 mg Rectal Q6H PRN    polyethylene glycol (MIRALAX) packet 17 g  17 g Oral DAILY PRN    ondansetron (ZOFRAN ODT) tablet 4 mg  4 mg Oral Q8H PRN    Or    ondansetron (ZOFRAN) injection 4 mg  4 mg IntraVENous Q6H PRN    enoxaparin (LOVENOX) injection 40 mg  40 mg SubCUTAneous Q12H    nicotine (NICODERM CQ) 14 mg/24 hr patch 1 Patch  1 Patch TransDERmal DAILY    pantoprazole (PROTONIX) tablet 40 mg  40 mg Oral ACB    arformoteroL (BROVANA) neb solution 15 mcg  15 mcg Nebulization BID RT    budesonide (PULMICORT) 500 mcg/2 ml nebulizer suspension  500 mcg Nebulization BID RT    albuterol-ipratropium (DUO-NEB) 2.5 MG-0.5 MG/3 ML  3 mL Nebulization Q4H RT    tiotropium bromide (SPIRIVA RESPIMAT) 2.5 mcg /actuation  2 Puff Inhalation DAILY    [Held by provider] losartan (COZAAR) tablet 50 mg  50 mg Oral DAILY    HYDROcodone-acetaminophen (NORCO)  mg tablet 1 Tablet  1 Tablet Oral Q6H PRN       Objective:     Patient Vitals for the past 24 hrs:   Temp Pulse Resp BP SpO2   11/06/21 0843     95 %   11/06/21 0713 97.8 °F (36.6 °C) (!) 105 12 123/66 93 %   11/06/21 0347     95 %   11/06/21 0323 98.5 °F (36.9 °C) (!) 101 16 (!) 141/78 93 %   11/06/21 0100  (!) 102 16 120/61 94 %   11/05/21 2355     95 %   11/05/21 2317 98 °F (36.7 °C) 100 27 (!) 150/61 94 %   11/05/21 2144 98.2 °F (36.8 °C) 87 30 (!) 144/68 93 %   11/05/21 2059     94 %   11/05/21 2018     98 %   11/05/21 2002     97 %   11/05/21 1910 98.2 °F (36.8 °C) 95 24 (!) 162/74 97 %   11/05/21 1540     97 %   11/05/21 1445 98 °F (36.7 °C) 93 26 (!) 143/69 93 %   11/05/21 1442 98.2 °F (36.8 °C) 97 17 (!) 143/69 97 %   11/05/21 1203     96 %         Intake and Output:  Current Shift: No intake/output data recorded. Last three shifts: 11/04 1901 - 11/06 0700  In: -   Out: 7037 [Urine:4175]    Physical Exam:   General:  Alert, sleepy, mild increase work of breathing noted   Eyes:  Conjunctivae/corneas clear. Ears:  Normal TMs and external ear canals both ears. Nose: Nares normal.    Mouth/Throat: Lips, mucosa, and tongue normal.    Neck:  no JVD. Back:   deferred. Lungs:   Mild wheezing at lower lobes   Heart:  Regular rate and rhythm, S1, S2 normal   Abdomen:   Soft, non-tender. Morbidly obese   Extremities: 2+ edema with erythema to LE bilaterally and left UE.     Pulses: 2+ and symmetric all extremities.    Skin: As above    Lymph nodes: Cervical, supraclavicular, and axillary nodes normal.   Neurologic: Able to answer all questions appropriately --sleepy       Lab/Data Review:    Recent Results (from the past 24 hour(s))   PROTHROMBIN TIME + INR    Collection Time: 11/06/21  3:59 AM   Result Value Ref Range    Prothrombin time 17.5 (H) 12.6 - 14.5 sec    INR 1.4     METABOLIC PANEL, BASIC    Collection Time: 11/06/21  3:59 AM   Result Value Ref Range    Sodium 139 136 - 145 mmol/L    Potassium 4.7 3.5 - 5.1 mmol/L    Chloride 97 (L) 98 - 107 mmol/L    CO2 43 (HH) 21 - 32 mmol/L    Anion gap Cannot be calculated 7 - 16 mmol/L    Glucose 124 (H) 65 - 100 mg/dL    BUN 34 (H) 8 - 23 MG/DL    Creatinine 0.79 0.6 - 1.0 MG/DL    GFR est AA >60 >60 ml/min/1.73m2    GFR est non-AA >60 >60 ml/min/1.73m2    Calcium 8.7 8.3 - 10.4 MG/DL   CBC W/O DIFF    Collection Time: 11/06/21  3:59 AM   Result Value Ref Range    WBC 6.2 4.3 - 11.1 K/uL    RBC 2.89 (L) 4.05 - 5.2 M/uL    HGB 8.0 (L) 11.7 - 15.4 g/dL    HCT 27.5 (L) 35.8 - 46.3 %    MCV 95.2 79.6 - 97.8 FL    MCH 27.7 26.1 - 32.9 PG    MCHC 29.1 (L) 31.4 - 35.0 g/dL    RDW 15.8 (H) 11.9 - 14.6 %    PLATELET 359 (L) 313 - 450 K/uL    MPV 9.6 9.4 - 12.3 FL    ABSOLUTE NRBC 0.03 0.0 - 0.2 K/uL       Assessment/ Plan:     Principal Problem:    Acute diastolic CHF (congestive heart failure) ([facility] (10/30/2021)    Active Problems:    GERD (gastroesophageal reflux disease) ()      Overview: EGD October 2016 showed an irregular Z line, and mild diffuse gastritis       and there are biopsy reports under pathology--Dr. Stefanie Modi      Hyperlipidemia ()      Fibromyalgia ()      Anxiety ()      Essential hypertension, benign ()      Morbid obesity (HCC) ()      COPD (chronic obstructive pulmonary disease) (HCC) ()      Chronic hypoxemic respiratory failure (HCC) (10/29/2021)      Anasarca (10/29/2021)      Cirrhosis ([facility] (11/1/2021)      Hypercapnia (11/5/2021)      Tobacco abuse (11/5/2021)    Acute diastolic CHF (congestive heart failure) ([facility] [address] (10/30/2021)  Bumex 1mg IV BID--will change to PO  Spironolactone 50mg   Monitor kidney function    Mild hyperkalemia  S/p Lokelma. K improved     Hypertension  Losartan has been held secondary to mild decrease in kidney function after Lasix. Kidney functions improving.         COPD exacerbation (chronic obstructive pulmonary disease) (HCC) ()  Prednisone  Continue nebs--she has nebulizer machine at home. Has bilateral wheezing, but improved from yesterday  Pulmonology will see in 2-4 weeks. Formal sleep study outpatient since high suspicion of BELLE       Chronic hypoxemic respiratory failure ([facility] [address] (10/29/2021)  On 4 L/min at home, currently on 5L NC       Anasarca (10/29/2021)  secondary to CHF and cirrhosis, slightly improved. On bumex and spironolactone.      Morbid obesity  BMI 46.85     Anxiety/depression  Continue Wellbutrin, Cymbalta     Hyperlipidemia  Continue home medication    Cirrhosis   GI will see in two weeks after discharge. UA from 10/30 with possible early UTI, Ceftriaxone given, so want to see if this will help with her mentation. Ordered urine culture 11/3 that is growing gram negative rods. Does admit to day time sleepiness, snoring, and she is morbidly obese. Will order overnight pulse oximeter on her baseline of 4L NC to see if any desaturations, if so, will need outpatient formal sleep study to ensure no BELLE. I have discussed with respiratory therapy    Patient declines rehab. Only wants HH with PT/OT.      DVT prophylaxis - Lovenox  Signed By: Estephanie Hernandez DO     November 6, 2021

## 2021-11-06 NOTE — PROGRESS NOTES
[facility] CARDIOLOGY PROGRESS NOTE           11/6/2021 11:54 AM    Admit Date: 10/29/2021      Subjective:   Patient reports some improvement in her dyspnea today, leg swelling also improved. Family members at bedside. Denies chest pain, palpitations, abdominal pain, nausea, vomiting. No other complaints at this time. ROS:  Cardiovascular:  As noted above    Objective:      Vitals:    11/06/21 0323 11/06/21 0347 11/06/21 0713 11/06/21 0843   BP: (!) 141/78  123/66    Pulse: (!) 101  (!) 105    Resp: 16  12    Temp: 98.5 °F (36.9 °C)  97.8 °F (36.6 °C)    SpO2: 93% 95% 93% 95%   Weight:       Height:           Physical Exam:  General-No Acute Distress, morbidly obese, awake, alert  Neck- supple, unable to see neck veins  CV- regular rate and rhythm no MRG  Lung-lower fields with Rales, expiratory wheezes bilaterally  Abd- soft, nontender, nondistended  Ext-mild edema bilaterally. Skin- warm and dry    Data Review:   Recent Labs     11/06/21  0359 11/05/21  0301    137   K 4.7 4.7   BUN 34* 38*   CREA 0.79 0.85   * 126*   WBC 6.2  --    HGB 8.0*  --    HCT 27.5*  --    *  --    INR 1.4 1.3   CHOL  --  135   LDLC  --  69.8   HDL  --  50       Assessment/Plan:      Acute diastolic CHF (congestive heart failure) ([facility] [address] (10/30/2021)  -Likely predominant component of cirrhosis contributing to presentation.  -Echo with preserved EF and normal RV size and fn there is concern for diastolic dysfunction but overall, has preserved LV systolic function. -  - renal function stable, normalized  - negative fluid balance for admission  -On p.o. diuretics     Active Problems:    Hyperlipidemia ()    - The 10-year ASCVD risk score (Holli Mak., et al., 2013) is: 16.1%    -on atorvastatin, LFTs normalized       Essential hypertension, benign ()   -Reasonably controlled at this point.      Chronic hypoxemic respiratory failure ([facility] [address] (10/29/2021)  - Likely multifactorial with obesity/hypoventilation/suspected underlying sleep apnea/diastolic dysfunction/third spacing and fluid retention from cirrhosis. - wean oxygen as tolerated   - ongoing wheezing, ongoing treatment with nebulizers and steroids, diuretics        COPD (chronic obstructive pulmonary disease) (HCC) ()  -on chronic 4L NC       Cirrhosis ([facility] [address] (11/1/2021)    Anasarca (10/29/2021)  - Thought to be secondary to TANG/obesity  - seen by GI  - no ascites at time of attempted paracentesis        Hyperkalemia (11/2/2021)  - closely monitor K levels if using aldactone       Morbid obesity (HCC) ()  -Contributes to dyspnea, long-term needs extensive weight loss BMI 46    Patient seems to be improving, at this point from a cardiovascular standpoint not much to add to her care. We will be on standby please call with questions.     Sonna Lombard, DO  11/6/2021 11:54 AM

## 2021-11-06 NOTE — PROGRESS NOTES
TRANSFER - IN REPORT:    Verbal report received from Cameron Mathews, [facility] on [place]  being received from ICU for routine progression of care      Report consisted of patients Situation, Background, Assessment and   Recommendations(SBAR). Information from the following report(s) SBAR, Kardex, [facility] and Recent Results was reviewed with the receiving nurse. Opportunity for questions and clarification was provided.

## 2021-11-06 NOTE — PROGRESS NOTES
TRANSFER - OUT REPORT:    Verbal report given to Dalila(name) on [address]  being transferred to Med/Surg(unit) for routine progression of care       Report consisted of patients Situation, Background, Assessment and   Recommendations(SBAR). Information from the following report(s) SBAR was reviewed with the receiving nurse. Lines:   Peripheral IV 10/31/21 Posterior;Right Hand (Active)   Site Assessment Clean, dry, & intact 11/05/21 2317   Phlebitis Assessment 0 11/05/21 2317   Infiltration Assessment 0 11/05/21 2317   Dressing Status Clean, dry, & intact 11/05/21 2317   Dressing Type Tape; Transparent 11/05/21 2317   Hub Color/Line Status Blue; Flushed; Patent 11/05/21 2317   Alcohol Cap Used No 11/05/21 2317        Opportunity for questions and clarification was provided.       Patient transported with:   O2 @ 4 liters  Registered Nurse

## 2021-11-07 VITALS
RESPIRATION RATE: 18 BRPM | BODY MASS INDEX: 46.25 KG/M2 | DIASTOLIC BLOOD PRESSURE: 72 MMHG | WEIGHT: 261 LBS | TEMPERATURE: 98.3 F | SYSTOLIC BLOOD PRESSURE: 141 MMHG | HEIGHT: 63 IN | OXYGEN SATURATION: 99 % | HEART RATE: 103 BPM

## 2021-11-07 LAB
ANION GAP SERPL CALC-SCNC: 4 MMOL/L (ref 7–16)
BACTERIA SPEC CULT: ABNORMAL
BACTERIA SPEC CULT: ABNORMAL
BASOPHILS # BLD: 0 K/UL (ref 0–0.2)
BASOPHILS NFR BLD: 0 % (ref 0–2)
BUN SERPL-MCNC: 28 MG/DL (ref 8–23)
CALCIUM SERPL-MCNC: 8.9 MG/DL (ref 8.3–10.4)
CHLORIDE SERPL-SCNC: 95 MMOL/L (ref 98–107)
CO2 SERPL-SCNC: 39 MMOL/L (ref 21–32)
CREAT SERPL-MCNC: 0.71 MG/DL (ref 0.6–1)
DIFFERENTIAL METHOD BLD: ABNORMAL
EOSINOPHIL # BLD: 0.2 K/UL (ref 0–0.8)
EOSINOPHIL NFR BLD: 2 % (ref 0.5–7.8)
ERYTHROCYTE [DISTWIDTH] IN BLOOD BY AUTOMATED COUNT: 15.8 % (ref 11.9–14.6)
GLUCOSE SERPL-MCNC: 148 MG/DL (ref 65–100)
HCT VFR BLD AUTO: 29.1 % (ref 35.8–46.3)
HGB BLD-MCNC: 8.4 G/DL (ref 11.7–15.4)
IMM GRANULOCYTES # BLD AUTO: 0 K/UL (ref 0–0.5)
IMM GRANULOCYTES NFR BLD AUTO: 0 % (ref 0–5)
INR PPP: 1.3
LYMPHOCYTES # BLD: 0.6 K/UL (ref 0.5–4.6)
LYMPHOCYTES NFR BLD: 10 % (ref 13–44)
MCH RBC QN AUTO: 27.4 PG (ref 26.1–32.9)
MCHC RBC AUTO-ENTMCNC: 28.9 G/DL (ref 31.4–35)
MCV RBC AUTO: 94.8 FL (ref 79.6–97.8)
MONOCYTES # BLD: 0.4 K/UL (ref 0.1–1.3)
MONOCYTES NFR BLD: 5 % (ref 4–12)
NEUTS SEG # BLD: 5.4 K/UL (ref 1.7–8.2)
NEUTS SEG NFR BLD: 82 % (ref 43–78)
NRBC # BLD: 0 K/UL (ref 0–0.2)
PLATELET # BLD AUTO: 110 K/UL (ref 150–450)
PMV BLD AUTO: 9.9 FL (ref 9.4–12.3)
POTASSIUM SERPL-SCNC: 4 MMOL/L (ref 3.5–5.1)
PROTHROMBIN TIME: 16.7 SEC (ref 12.6–14.5)
RBC # BLD AUTO: 3.07 M/UL (ref 4.05–5.2)
SERVICE CMNT-IMP: ABNORMAL
SODIUM SERPL-SCNC: 138 MMOL/L (ref 136–145)
WBC # BLD AUTO: 6.6 K/UL (ref 4.3–11.1)

## 2021-11-07 PROCEDURE — 94760 N-INVAS EAR/PLS OXIMETRY 1: CPT

## 2021-11-07 PROCEDURE — 80048 BASIC METABOLIC PNL TOTAL CA: CPT

## 2021-11-07 PROCEDURE — 97535 SELF CARE MNGMENT TRAINING: CPT

## 2021-11-07 PROCEDURE — 74011250637 HC RX REV CODE- 250/637: Performed by: FAMILY MEDICINE

## 2021-11-07 PROCEDURE — 36415 COLL VENOUS BLD VENIPUNCTURE: CPT

## 2021-11-07 PROCEDURE — 94640 AIRWAY INHALATION TREATMENT: CPT

## 2021-11-07 PROCEDURE — 74011250637 HC RX REV CODE- 250/637: Performed by: PHYSICIAN ASSISTANT

## 2021-11-07 PROCEDURE — 74011636637 HC RX REV CODE- 636/637: Performed by: INTERNAL MEDICINE

## 2021-11-07 PROCEDURE — 74011250637 HC RX REV CODE- 250/637: Performed by: INTERNAL MEDICINE

## 2021-11-07 PROCEDURE — 85025 COMPLETE CBC W/AUTO DIFF WBC: CPT

## 2021-11-07 PROCEDURE — 74011250636 HC RX REV CODE- 250/636: Performed by: PHYSICIAN ASSISTANT

## 2021-11-07 PROCEDURE — 74011000250 HC RX REV CODE- 250: Performed by: PHYSICIAN ASSISTANT

## 2021-11-07 PROCEDURE — 77010033678 HC OXYGEN DAILY

## 2021-11-07 PROCEDURE — 85610 PROTHROMBIN TIME: CPT

## 2021-11-07 RX ORDER — BUMETANIDE 1 MG/1
1 TABLET ORAL 2 TIMES DAILY
Qty: 60 TABLET | Refills: 0 | Status: SHIPPED | OUTPATIENT
Start: 2021-11-07

## 2021-11-07 RX ORDER — BUDESONIDE 0.5 MG/2ML
500 INHALANT ORAL 2 TIMES DAILY
Qty: 60 EACH | Refills: 0 | Status: SHIPPED | OUTPATIENT
Start: 2021-11-07

## 2021-11-07 RX ORDER — PREDNISONE 20 MG/1
40 TABLET ORAL
Qty: 6 TABLET | Refills: 0 | Status: SHIPPED | OUTPATIENT
Start: 2021-11-08 | End: 2021-11-11

## 2021-11-07 RX ORDER — CIPROFLOXACIN 500 MG/1
500 TABLET ORAL EVERY 12 HOURS
Status: DISCONTINUED | OUTPATIENT
Start: 2021-11-07 | End: 2021-11-07 | Stop reason: HOSPADM

## 2021-11-07 RX ORDER — ALBUTEROL SULFATE 0.83 MG/ML
2.5 SOLUTION RESPIRATORY (INHALATION)
Qty: 60 EACH | Refills: 1 | Status: SHIPPED | OUTPATIENT
Start: 2021-11-07

## 2021-11-07 RX ORDER — NYSTATIN 100000 [USP'U]/G
POWDER TOPICAL 2 TIMES DAILY
Qty: 1 EACH | Refills: 0 | Status: SHIPPED | OUTPATIENT
Start: 2021-11-07

## 2021-11-07 RX ORDER — SPIRONOLACTONE 50 MG/1
50 TABLET, FILM COATED ORAL DAILY
Qty: 30 TABLET | Refills: 0 | Status: SHIPPED | OUTPATIENT
Start: 2021-11-08

## 2021-11-07 RX ORDER — IBUPROFEN 200 MG
1 TABLET ORAL DAILY
Qty: 30 PATCH | Refills: 0 | Status: SHIPPED | OUTPATIENT
Start: 2021-11-08 | End: 2021-12-08

## 2021-11-07 RX ORDER — CIPROFLOXACIN 500 MG/1
500 TABLET ORAL EVERY 12 HOURS
Qty: 13 TABLET | Refills: 0 | Status: SHIPPED | OUTPATIENT
Start: 2021-11-07 | End: 2021-11-14

## 2021-11-07 RX ORDER — PANTOPRAZOLE SODIUM 40 MG/1
40 TABLET, DELAYED RELEASE ORAL
Qty: 30 TABLET | Refills: 0 | Status: SHIPPED | OUTPATIENT
Start: 2021-11-08

## 2021-11-07 RX ADMIN — Medication 81 MG: at 09:22

## 2021-11-07 RX ADMIN — IPRATROPIUM BROMIDE AND ALBUTEROL SULFATE 3 ML: .5; 3 SOLUTION RESPIRATORY (INHALATION) at 08:20

## 2021-11-07 RX ADMIN — ARFORMOTEROL TARTRATE 15 MCG: 15 SOLUTION RESPIRATORY (INHALATION) at 08:20

## 2021-11-07 RX ADMIN — SPIRONOLACTONE 50 MG: 25 TABLET ORAL at 09:23

## 2021-11-07 RX ADMIN — HYDROCODONE BITARTRATE AND ACETAMINOPHEN 1 TABLET: 10; 325 TABLET ORAL at 01:06

## 2021-11-07 RX ADMIN — NYSTATIN: 100000 POWDER TOPICAL at 09:26

## 2021-11-07 RX ADMIN — BUDESONIDE 500 MCG: 0.5 INHALANT RESPIRATORY (INHALATION) at 08:20

## 2021-11-07 RX ADMIN — CIPROFLOXACIN 500 MG: 500 TABLET, FILM COATED ORAL at 09:23

## 2021-11-07 RX ADMIN — ENOXAPARIN SODIUM 40 MG: 100 INJECTION SUBCUTANEOUS at 09:23

## 2021-11-07 RX ADMIN — TIOTROPIUM BROMIDE INHALATION SPRAY 2 PUFF: 3.12 SPRAY, METERED RESPIRATORY (INHALATION) at 08:20

## 2021-11-07 RX ADMIN — PANTOPRAZOLE SODIUM 40 MG: 40 TABLET, DELAYED RELEASE ORAL at 09:23

## 2021-11-07 RX ADMIN — IPRATROPIUM BROMIDE AND ALBUTEROL SULFATE 3 ML: .5; 3 SOLUTION RESPIRATORY (INHALATION) at 03:52

## 2021-11-07 RX ADMIN — BUMETANIDE 1 MG: 1 TABLET ORAL at 09:22

## 2021-11-07 RX ADMIN — ATORVASTATIN CALCIUM: 10 TABLET, FILM COATED ORAL at 09:22

## 2021-11-07 RX ADMIN — DULOXETINE HYDROCHLORIDE 60 MG: 60 CAPSULE, DELAYED RELEASE ORAL at 09:22

## 2021-11-07 RX ADMIN — PREDNISONE 40 MG: 10 TABLET ORAL at 09:22

## 2021-11-07 RX ADMIN — HYDROCODONE BITARTRATE AND ACETAMINOPHEN 1 TABLET: 10; 325 TABLET ORAL at 06:41

## 2021-11-07 RX ADMIN — BUPROPION HYDROCHLORIDE 150 MG: 150 TABLET, EXTENDED RELEASE ORAL at 09:23

## 2021-11-07 RX ADMIN — Medication 10 ML: at 01:06

## 2021-11-07 NOTE — PROGRESS NOTES
Problem: Pressure Injury - Risk of  Goal: *Prevention of pressure injury  Description: Document Duong Scale and appropriate interventions in the flowsheet. Outcome: Progressing Towards Goal  Note: Pressure Injury Interventions:       Moisture Interventions: Apply protective barrier, creams and emollients    Activity Interventions: Increase time out of bed    Mobility Interventions: Pressure redistribution bed/mattress (bed type)    Nutrition Interventions: Offer support with meals,snacks and hydration    Friction and Shear Interventions: Foam dressings/transparent film/skin sealants, Minimize layers                Problem: Patient Education: Go to Patient Education Activity  Goal: Patient/Family Education  Outcome: Progressing Towards Goal     Problem: Falls - Risk of  Goal: *Absence of Falls  Description: Document Yefri Fall Risk and appropriate interventions in the flowsheet.   Outcome: Progressing Towards Goal  Note: Fall Risk Interventions:  Mobility Interventions: Communicate number of staff needed for ambulation/transfer         Medication Interventions: Patient to call before getting OOB    Elimination Interventions: Call light in reach              Problem: Patient Education: Go to Patient Education Activity  Goal: Patient/Family Education  Outcome: Progressing Towards Goal     Problem: Patient Education: Go to Patient Education Activity  Goal: Patient/Family Education  Outcome: Progressing Towards Goal     Problem: Patient Education: Go to Patient Education Activity  Goal: Patient/Family Education  Outcome: Progressing Towards Goal     Problem: Airway Clearance - Ineffective  Goal: *Patent airway  Outcome: Progressing Towards Goal  Goal: *Absence of airway secretions  Outcome: Progressing Towards Goal  Goal: *Able to cough effectively  Outcome: Progressing Towards Goal     Problem: Patient Education: Go to Patient Education Activity  Goal: Patient/Family Education  Outcome: Progressing Towards Goal Problem: Pain  Goal: *Control of Pain  Outcome: Progressing Towards Goal     Problem: Patient Education: Go to Patient Education Activity  Goal: Patient/Family Education  Outcome: Progressing Towards Goal

## 2021-11-07 NOTE — DISCHARGE SUMMARY
Hospitalist Discharge Summary     Patient ID:  Erick Angel  [id number]  89 y.o.  1955  Admit date: 10/29/2021  8:02 AM  Discharge date and time: 11/7/2021  Attending: Camila Sawant DO  PCP:  Sarah Sampson MD  Treatment Team: Attending Provider: Camila Sawant DO; Care Manager: Karla Guerrero, MATTEO; : Toñito Zamora; Utilization Review: Esau Booker RN; Consulting Provider: Federico Mckeon MD; Student Nurse: Ciro Domínguez; Utilization Review: Tram Jenkins RN; Consulting Provider: Wesly James MD; Primary Nurse: Lily Hernandez RN; Occupational Therapist: Itzel Aguilera OT    Principal Diagnosis Acute diastolic CHF (congestive heart failure) [facility])   Principal Problem:    Acute diastolic CHF (congestive heart failure) ([facility] [address] (10/30/2021)    Active Problems:    GERD (gastroesophageal reflux disease) ()      Overview: EGD October 2016 showed an irregular Z line, and mild diffuse gastritis       and there are biopsy reports under pathology--Dr. Spencer Layton      Hyperlipidemia ()      Fibromyalgia ()      Anxiety ()      Essential hypertension, benign ()      Morbid obesity ([facility] [address] ()      COPD (chronic obstructive pulmonary disease) ([facility] [address] ()      Chronic hypoxemic respiratory failure ([facility] [address] (10/29/2021)      Anasarca (10/29/2021)      Cirrhosis ([facility] [address] (11/1/2021)      Hypercapnia (11/5/2021)      Tobacco abuse (11/5/2021)     Hospital Course: \"65 y.o. female with medical history of chronic hypoxemic respiratory failure (4L via NC at baseline), COPD with ongoing tobacco abuse, HTN, chronic back pain, chronic edema with probable CHF who presented with increasing dyspnea and edema.  She was evaluated in the ER 10/25/2021 for the same presentation and noted to have increased symptoms > 2 weeks.  Pt has underlying CHF (no echo on record) and admits to compliance with lasix.  BNP was elevated 522 with trop 33.3 though cxray without gross abnl.  Pt was given IV lasix and then dc'd from the ER with a 3-day prescription of lasix though daughter reports she already had the same prescription at home.     Over the past couple of days, pt has had increased orthopnea, MONTELONGO, edema / anasarca and admits to increased abdominal girth.  She presented back to the ER today and cxray now revealed increased pulm vascular markings with BNP slightly increased to 549 though troponin levels improved at 19.2. Aurora Chamberlainrdle her worsening pulmonary symptoms worsened by her underlying COPD which is now in exacerbation, Hospitalist consulted by ER for inpatient admission. \" Cardiology following. Lasix started but was held over the past couple of days. Bumex then started on 11/3. Spironolactone also added.     New diagnosis of cirrhosis. Plan was for paracentesis on 11/3 but not enough fluid to drain. GI will see as outpatient. Poor long term prognosis     ECHO showing grade 1 diastolic dysfunction with EF 60%     Hx of morbid obesity. Admits to snoring at night and day time sleepiness. Has never been evaluated for sleep apnea. Overnight pulse oximeter with no desaturations. Pulmonology has recommended formal sleep study outpatient. Weight loss encouraged.      UTI also found. Urine culture grew ESBL sensitive to ciprofloxacin. Zosyn given on 11/3-11/4 but changed to Ceftriaxone on 11/5. Ciprofloxacin started on 11/7 and will be given for a total of 7 days. Needs to stop smoking. Has had a total of 11Liter output. Now back to her home baseline of 4L NC. Feeling better and wanting to go home. She has declined rehab. Will to go home with home health. Out of her nebulizer medications so I have written for. Stated to reduce Xanax to 1mg BID as needed to reduce respiratory depression. Avoid Geodon while on antibiotic since can cause respiratory depression. Take over the counter probiotic while on antibiotic. Losartan/HCTZ has been stopped due to worsening kidney function during hospital stay.  BP currently controlled. If fevers, AMS, SOB, please come back to the ED. Family very involved with care. Please refer to the admission H&P for details of presentation. In summary, the patient is stable for discharge. Significant Diagnostic Studies:       Labs: Results:       Chemistry Recent Labs     11/07/21  1110 11/06/21 0359 11/05/21  0301   * 124* 126*    139 137   K 4.0 4.7 4.7   CL 95* 97* 97*   CO2 39* 43* 39*   BUN 28* 34* 38*   CREA 0.71 0.79 0.85   CA 8.9 8.7 8.8   AGAP 4* Cannot be calculated 1*      CBC w/Diff Recent Labs     11/07/21  1110 11/06/21 0359   WBC 6.6 6.2   RBC 3.07* 2.89*   HGB 8.4* 8.0*   HCT 29.1* 27.5*   * 114*   GRANS 82*  --    LYMPH 10*  --    EOS 2  --       Cardiac Enzymes No results for input(s): CPK, CKND1, BONNIE in the last 72 hours. No lab exists for component: CKRMB, TROIP   Coagulation Recent Labs     11/07/21  0338 11/06/21 0359   PTP 16.7* 17.5*   INR 1.3 1.4       Lipid Panel Lab Results   Component Value Date/Time    Cholesterol, total 135 11/05/2021 03:01 AM    HDL Cholesterol 50 11/05/2021 03:01 AM    LDL, calculated 69.8 11/05/2021 03:01 AM    VLDL, calculated 15.2 11/05/2021 03:01 AM    Triglyceride 76 11/05/2021 03:01 AM    CHOL/HDL Ratio 2.7 11/05/2021 03:01 AM      BNP No results for input(s): BNPP in the last 72 hours. Liver Enzymes No results for input(s): TP, ALB, TBIL, AP in the last 72 hours.     No lab exists for component: SGOT, GPT, DBIL   Thyroid Studies Lab Results   Component Value Date/Time    TSH 1.990 02/12/2018 10:21 AM            Discharge Exam:  Patient Vitals for the past 24 hrs:   Temp Pulse Resp BP SpO2   11/07/21 0844 98.3 °F (36.8 °C) (!) 107 18 (!) 116/59 99 %   11/07/21 0821     96 %   11/07/21 0352     95 %   11/07/21 0322 97.9 °F (36.6 °C) 88 17 132/60 93 %   11/06/21 2347 98.6 °F (37 °C) 94 17 139/62 94 %   11/06/21 2324     99 %   11/06/21 2009     96 %   11/06/21 1955 98.2 °F (36.8 °C) (!) 102 18 (!) 150/70 94 %   11/06/21 1627 98.1 °F (36.7 °C) (!) 106 18 (!) 149/70 93 %   11/06/21 1556     97 %   11/06/21 1303     98 %   11/06/21 1153 98.4 °F (36.9 °C) (!) 106 16 138/72 93 %      General appearance: alert, cooperative, no distress, appears stated age  Lungs: clear to auscultation bilaterally but limited breath sounds  Heart: regular rate and rhythm, S1, S2 normal  Abdomen: soft, non-tender. Bowel sounds normal. Morbidly obese  Extremities: 2+ edema bilaterally   Neurologic: Grossly normal    Disposition: PT/OT  Discharge Condition: stable  Patient Instructions: As above   Current Discharge Medication List      START taking these medications    Details   spironolactone (ALDACTONE) 50 mg tablet Take 1 Tablet by mouth daily. Qty: 30 Tablet, Refills: 0  Start date: 11/8/2021      pantoprazole (PROTONIX) 40 mg tablet Take 1 Tablet by mouth Daily (before breakfast). Qty: 30 Tablet, Refills: 0  Start date: 11/8/2021      nicotine (NICODERM CQ) 14 mg/24 hr patch 1 Patch by TransDERmal route daily for 30 days. Qty: 30 Patch, Refills: 0  Start date: 11/8/2021, End date: 12/8/2021      ciprofloxacin HCl (CIPRO) 500 mg tablet Take 1 Tablet by mouth every twelve (12) hours for 13 doses. Qty: 13 Tablet, Refills: 0  Start date: 11/7/2021, End date: 11/14/2021      bumetanide (BUMEX) 1 mg tablet Take 1 Tablet by mouth two (2) times a day. Qty: 60 Tablet, Refills: 0  Start date: 11/7/2021      budesonide (PULMICORT) 0.5 mg/2 mL nbsp 2 mL by Nebulization route two (2) times a day. Qty: 60 Each, Refills: 0  Start date: 11/7/2021      nystatin (MYCOSTATIN) powder Apply  to affected area two (2) times a day. Qty: 1 Each, Refills: 0  Start date: 11/7/2021         CONTINUE these medications which have CHANGED    Details   predniSONE (DELTASONE) 20 mg tablet Take 40 mg by mouth daily (with breakfast) for 3 doses.   Qty: 6 Tablet, Refills: 0  Start date: 11/8/2021, End date: 11/11/2021      albuterol (PROVENTIL VENTOLIN) 2.5 mg /3 mL (0.083 %) nebu 3 mL by Nebulization route every six (6) hours as needed for Wheezing or Shortness of Breath. Qty: 60 Each, Refills: 1  Start date: 11/7/2021         CONTINUE these medications which have NOT CHANGED    Details   ALPRAZolam (XANAX) 2 mg tablet Take 1 Tab by mouth two (2) times daily as needed for Anxiety. Max Daily Amount: 4 mg. Qty: 60 Tab, Refills: 0    Associated Diagnoses: Anxiety      HYDROcodone-acetaminophen (NORCO)  mg tablet Take 2 Tabs by mouth three (3) times daily. Max Daily Amount: 6 Tabs. Qty: 180 Tab, Refills: 0    Associated Diagnoses: Chronic pain syndrome      naloxone (NARCAN) 4 mg/actuation nasal spray 1 Powderly by IntraNASal route once as needed for up to 2 doses. As directed; may repeat every 2-3 mins until ems arrives or patient responds  Indications: OPIATE-INDUCED RESPIRATORY DEPRESSION, If stops breathing from too much pain meds  Qty: 2 Each, Refills: 1    Associated Diagnoses: Chronic pain syndrome      albuterol (VENTOLIN HFA) 90 mcg/actuation inhaler Take 2 Puffs by inhalation every four (4) hours as needed for Wheezing. Qty: 1 Inhaler, Refills: 0      umeclidinium-vilanterol (ANORO ELLIPTA) 62.5-25 mcg/actuation inhaler Take 1 Puff by inhalation daily. Qty: 1 Inhaler, Refills: 0      miSOPROStol (CYTOTEC) 200 mcg tablet Take 1 Tab by mouth two (2) times a day. To coat stomach. Qty: 180 Tab, Refills: 3      promethazine (PHENERGAN) 25 mg tablet Take 1 Tab by mouth every six (6) hours as needed for Nausea. Qty: 30 Tab, Refills: 2      Lancets (ACCU-CHEK SOFTCLIX LANCETS) misc 1 lancet two times daily. DX: E11.9 Diabetes  Qty: 200 Each, Refills: 3      glucose blood VI test strips (ACCU-CHEK LASHELL PLUS TEST STRP) strip 1 strip two times daily. DX E11.9 Diabetes  Qty: 200 Strip, Refills: 3      Blood-Glucose Meter (ACCU-CHEK LASHELL PLUS METER) misc Use meter two times daily to check blood sugars.  DX: E11.9 Diabetes  Qty: 1 Each, Refills: 0      buPROPion SR (WELLBUTRIN SR) 150 mg SR tablet Take 1 Tab by mouth two (2) times a day. Qty: 180 Tab, Refills: 0      DULoxetine (CYMBALTA) 60 mg capsule Take 1 Cap by mouth daily. Take 1 cap PO at bedtime    Indications: FIBROMYALGIA  Qty: 90 Cap, Refills: 3      omeprazole (PRILOSEC) 40 mg capsule Take 1 Cap by mouth daily. 30 min before breakfast  Indications: HEARTBURN, To lower stomach acid  Qty: 90 Cap, Refills: 3      ezetimibe-simvastatin (VYTORIN 10/20) 10-20 mg per tablet Take 1 Tab by mouth nightly. Indications: to lower cholesterol  Qty: 90 Tab, Refills: 3      magnesium oxide 500 mg tab Take  by mouth.      mv,Ca,min-folic acid-vit K1 (ONE-A-DAY WOMEN'S 50 PLUS) 400-20 mcg tab Take 1 Tab by mouth daily. Qty: 90 Tab, Refills: 3      aspirin delayed-release 81 mg tablet Take 81 mg by mouth daily. calcium citrate-vitamin D3 (CITRACAL + D) tablet Take 2 Tabs by mouth nightly. travoprost (TRAVATAN Z) 0.004 % ophthalmic solution Administer 1 Drop to both eyes every evening. STOP taking these medications       furosemide (LASIX) 20 mg tablet Comments:   Reason for Stopping:         ziprasidone (GEODON) 20 mg capsule Comments:   Reason for Stopping:         losartan-hydroCHLOROthiazide (HYZAAR) 50-12.5 mg per tablet Comments:   Reason for Stopping:         gabapentin (NEURONTIN) 600 mg tablet Comments:   Reason for Stopping:         potassium chloride (KLOR-CON M10) 10 mEq tablet Comments:   Reason for Stopping:               Activity:Up and brittany with assistance   Diet:2gm Na diet. 1L fluid restriction. Wound Care:None     Follow-up PCP in one week. Pulmonology in two weeks. GI in two weeks. Cardiology in two weeks.    ·     Time spent to discharge patient 35 minutes  Signed:  Zoie Bauer DO  11/7/2021  11:48 AM

## 2021-11-07 NOTE — PROGRESS NOTES
Problem: Self Care Deficits Care Plan (Adult)  Goal: *Acute Goals and Plan of Care (Insert Text)  Outcome: Progressing Towards Goal  Note:   1. Patient will complete lower body dressing with contact guard assist to increase self care independence. 2. Patient will complete toileting with supervision to increase self care independence. 3. Patient will tolerate 25 minutes of OT treatment with self incorporated rest breaks to increase activity tolerance to enhance participation in hobbies. 4. Patient will complete all functional transfers with stand by assist using adaptive equipment as needed. 5. Patient will complete UE exercises with independence to increase overall activity tolerance and strength. Timeframe: 7 visits       OCCUPATIONAL THERAPY: Daily Note and PM 11/7/2021  INPATIENT: OT Visit Days: 2  Payor: Jose Orosco / Plan: [address] [place] [address] HMO / Product Type: [employer] /      NAME/AGE/GENDER: Madison Thorne is a 77 y.o. female   PRIMARY DIAGNOSIS:  Acute exacerbation of CHF (congestive heart failure) ([facility]) [I62.6] Acute diastolic CHF (congestive heart failure) ([facility]) Acute diastolic CHF (congestive heart failure) ([facility] [address]       ICD-10: Treatment Diagnosis:    · Generalized Muscle Weakness (M62.81)  · Difficulty in walking, Not elsewhere classified (R26.2)  · History of falling (Z91.81)   Precautions/Allergies:   Patient has no known allergies. ASSESSMENT:     Ms. Shane Soto presents with the above diagnosis and deficits in strength, ADL function, and mobility. She is limited today by edema and shortness of breath. At baseline, she is independent with ADLs and lives with her daughter, Navneet Blackburn. This past week she has experienced an increased need for assistance from family to perform daily tasks. She was able to perform bed mobility with min A, mobility in room and hallway with CGA, RW, and chair follow and was left sitting up in recliner with all needs met and in reach.  She remains on 4L NC and sats in 90s. Pt is functioning below baseline and will benefit from skilled OT during hospital stay. 11-4-21 Pt ready to get up on arrival.  Pt completed functional mobility and transfers with min assist. Pt progressing. Continue OT POC.    11-7-21 Pt agreeable to treatment. Granddaughter present. Pt agreeable to shower. Granddaughter assisted pt with bathing and dressing. See chart below for assist level. Pt toileted x 3 with setup. Granddaughter assisted with hygiene. Pt needing increased assistance for ADLs. Pt with great family support. Continue OT POC    This section established at most recent assessment   PROBLEM LIST (Impairments causing functional limitations):  1. Decreased Strength  2. Decreased ADL/Functional Activities  3. Decreased Transfer Abilities  4. Decreased Ambulation Ability/Technique  5. Decreased Balance  6. Increased Pain  7. Decreased Activity Tolerance  8. Increased Shortness of Breath   INTERVENTIONS PLANNED: (Benefits and precautions of occupational therapy have been discussed with the patient.)  1. Activities of daily living training  2. Adaptive equipment training  3. Balance training  4. Clothing management  5. Cognitive training  6. Neuromuscular re-eduation  7. Therapeutic activity  8. Therapeutic exercise     TREATMENT PLAN: Frequency/Duration: Follow patient 3x/week to address above goals. Rehabilitation Potential For Stated Goals: Good     REHAB RECOMMENDATIONS (at time of discharge pending progress):    Placement: It is my opinion, based on this patient's performance to date, that Ms. Mohinder Davalos may benefit from [address] after discharge due to the functional deficits listed above that are likely to improve with skilled rehabilitation because he/she has multiple medical issues that affect his/her functional mobility in the community.   Equipment:    None at this time              OCCUPATIONAL PROFILE AND HISTORY:   History of Present Injury/Illness (Reason for Referral):  See H&P  Past Medical History/Comorbidities:   Ms. Heather Browne  has a past medical history of Abnormal glucose level, Anxiety, Back pain, Chronic pain disorder, COPD (chronic obstructive pulmonary disease) ([facility] [address] Depression, Elevated blood sugar, Elevated IOP, Essential hypertension, benign, Fatigue, Fibromyalgia, Fracture of distal fibula, GERD (gastroesophageal reflux disease), Ground glass opacity present on imaging of lung, Hyperlipidemia, Hypokalemia, Menopausal problem, Metabolic syndrome, Nausea, Obesity, Osteopenia, and URI (upper respiratory infection). Ms. Heather Browne  has a past surgical history that includes hx cholecystectomy; hx hysterectomy; hx tonsillectomy; and colonoscopy (N/A, 10/13/2016). Social History/Living Environment:   Home Environment: Patient room  # Steps to Enter: 6  Rails to Enter: Yes  One/Two Story Residence: One story  Living Alone: No  Support Systems: Other Family Member(s) (sisters)  Patient Expects to be Discharged to[de-identified] House  Current DME Used/Available at Home: Kait Jose, rollator, Tub transfer bench  Tub or Shower Type: Tub/Shower combination  Prior Level of Function/Work/Activity:  Independent, lives with family     Number of Personal Factors/Comorbidities that affect the Plan of Care: Brief history (0):  LOW COMPLEXITY   ASSESSMENT OF OCCUPATIONAL PERFORMANCE[de-identified]   Activities of Daily Living:   Basic ADLs (From Assessment) Complex ADLs (From Assessment)   Feeding: Independent  Oral Facial Hygiene/Grooming: Stand-by assistance  Bathing: Moderate assistance  Upper Body Dressing: Setup  Lower Body Dressing:  Moderate assistance  Toileting: Contact guard assistance     Grooming/Bathing/Dressing Activities of Daily Living   Grooming  Grooming Assistance: Minimum assistance  Position Performed: Seated in chair  Washing Face: Minimum assistance Cognitive Retraining  Safety/Judgement: Fall prevention   Upper Body Bathing  Bathing Assistance: Minimum assistance  Position Performed: Seated in chair  Cues: Verbal cues provided  Adaptive Equipment: Grab bar; Shower chair Feeding  Feeding Assistance: Set-up   Lower Body Bathing  Bathing Assistance: Maximum assistance; Total assistance(dependent)  Perineal  : Total assistance (dependent)  Position Performed: Seated in chair; Standing  Cues: Verbal cues provided  Adaptive Equipment: Grab bar  Lower Body : Total assistance (dependent); Maximum assistance  Position Performed: Seated in chair; Standing  Cues: Verbal cues provided  Adaptive Equipment: Grab bar; Shower chair Toileting  Toileting Assistance: Set-up  Bladder Hygiene: Total assistance (dependent)   Upper Body Dressing Assistance  Dressing Assistance: Minimum assistance  Hospital Gown: Minimum  assistance Functional Transfers  Bathroom Mobility: Contact guard assistance  Toilet Transfer : Contact guard assistance  Shower Transfer: Contact guard assistance  Adaptive Equipment: Grab bars; Walker (comment); Shower chair with back   Lower Body Dressing Assistance  Dressing Assistance: Maximum assistance; Total assistance(dependent)  Socks:  Total assistance (dependent)  Adaptive Equipment Used: Grab bar; Walker Bed/Mat Mobility  Supine to Sit: Minimum assistance  Sit to Stand: Contact guard assistance  Stand to Sit: Contact guard assistance  Bed to Chair: Contact guard assistance  Scooting: Contact guard assistance     Most Recent Physical Functioning:   Gross Assessment:                  Posture:     Balance:  Sitting: Intact  Standing: With support; Pull to stand Bed Mobility:  Supine to Sit: Minimum assistance  Scooting: Contact guard assistance  Wheelchair Mobility:     Transfers:  Sit to Stand: Contact guard assistance  Stand to Sit: Contact guard assistance  Bed to Chair: Contact guard assistance            Patient Vitals for the past 6 hrs:   BP BP Patient Position SpO2 O2 Flow Rate (L/min) Pulse   11/07/21 0821   96 % 4 l/min    11/07/21 0844 (!) 116/59 Sitting 99 %  (!) 107   21 1226 (!) 141/72 Sitting 99 %  (!) 103       Mental Status  Neurologic State: Alert  Orientation Level: Oriented X4  Cognition: Follows commands  Perception: Appears intact  Perseveration: No perseveration noted  Safety/Judgement: Fall prevention                          Physical Skills Involved:  1. Range of Motion  2. Balance  3. Strength  4. Activity Tolerance Cognitive Skills Affected (resulting in the inability to perform in a timely and safe manner):  1. [facility] Psychosocial Skills Affected:  1. Environmental Adaptation   Number of elements that affect the Plan of Care: 3-5:  MODERATE COMPLEXITY   CLINICAL DECISION MAKIN[address] [place] [address] AM-PAC 6 Clicks   Daily Activity Inpatient Short Form  How much help from another person does the patient currently need. .. Total A Lot A Little None   1. Putting on and taking off regular lower body clothing? [] 1   [x] 2   [] 3   [] 4   2. Bathing (including washing, rinsing, drying)? [] 1   [x] 2   [] 3   [] 4   3. Toileting, which includes using toilet, bedpan or urinal?   [] 1   [] 2   [x] 3   [] 4   4. Putting on and taking off regular upper body clothing? [] 1   [] 2   [x] 3   [] 4   5. Taking care of personal grooming such as brushing teeth? [] 1   [] 2   [] 3   [x] 4   6. Eating meals? [] 1   [] 2   [] 3   [x] 4   © , Trustees of [address] [place] [id number], under license to [employer]. All rights reserved      Score:  Initial: 16 Most Recent: X (Date: -- )    Interpretation of Tool:  Represents activities that are increasingly more difficult (i.e. Bed mobility, Transfers, Gait). Medical Necessity:     · Skilled intervention continues to be required due to the above deficits. Reason for Services/Other Comments:  · Patient continues to require skilled intervention due to   · CHF exacerbation  · .    Use of outcome tool(s) and clinical judgement create a POC that gives a: LOW COMPLEXITY         TREATMENT:   (In addition to Assessment/Re-Assessment sessions the following treatments were rendered)     Pre-treatment Symptoms/Complaints: Tolerated sitting up in recliner after shower  Pain: Initial:   Pain Intensity 1: 0  Post Session:  0     Self Care: (75): Procedure(s) (per grid) utilized to improve and/or restore self-care/home management as related to dressing, bathing, toileting, grooming and functional mobility . Required minimal verbal and   cueing to facilitate activities of daily living skills and compensatory activities. Braces/Orthotics/Lines/Etc:   · O2 Device: Nasal cannula  Treatment/Session Assessment:    · Response to Treatment:  Good, up in chair  · Interdisciplinary Collaboration:   o Occupational Therapist  o Registered Nurse  · After treatment position/precautions:   o Up in chair  o Bed/Chair-wheels locked  o Caregiver at bedside  o Call light within reach  o RN notified   · Compliance with Program/Exercises: Will assess as treatment progresses. · Recommendations/Intent for next treatment session: \"Next visit will focus on advancements to more challenging activities and reduction in assistance provided\".   Total Treatment Duration:  OT Patient Time In/Time Out  Time In: 1100  Time Out: [address] [facility]

## 2021-11-07 NOTE — PROGRESS NOTES
Care Management Interventions  PCP Verified by CM: Yes (Dr. Ewa Morrow)  Mode of Transport at Discharge:  (family)  Transition of Care Consult (CM Consult): Home Health (Pt current w/ [facility])  [facility] - INPATIENT: No  Reason Outside [place]: Patient already serviced by other home care/hospice agency  Support Systems: Other Family Member(s) (sisters)  Confirm Follow Up Transport: Family  The Patient and/or Patient Representative was Provided with a Choice of Provider and Agrees with the Discharge Plan?: Yes  Freedom of Choice List was Provided with Basic Dialogue that Supports the Patient's Individualized Plan of Care/Goals, Treatment Preferences and Shares the Quality Data Associated with the Providers?: Yes  Discharge Location  Discharge Placement: Home with home health  Provided [facility] information. Pt discharged to home with family. Faxed orders for [place] PT and OT to [facility] and spoke [facility] on-call to notify of pt discharge.

## 2021-11-07 NOTE — PROGRESS NOTES
Reviewed notes for concerns      Per notes:    DNR    Has directives on file    [facility]    Jaciel Beckham    Lives in [facility]    Cirrhosis (kang)    Morbid obesity          Will continue to assess how to best serve this family

## 2021-11-07 NOTE — PROGRESS NOTES
Critical urine culture result. Clean catch urine culture positive for ESBL klebsiella pneumonia. MD notified, no new orders.

## 2021-11-07 NOTE — PROGRESS NOTES
Clayton catheter removed per verbal order of MD. Pt voided on multiple occurences after removal.     Discharge instructions and education completed. Prescriptions reviewed with patient and grand-daughter. Opportunity for questions and clarification provided. Patient verbalizes understanding. Patient discharged in stable condition to car via wheelchair.

## 2022-05-12 NOTE — THERAPY DISCHARGE
[address] [facility]  : 1955  Primary: [employer] H[employer]  Secondary:  [address]  at [address] [address], [place] [address].  [phone]   [phone]         OUTPATIENT OCCUPATIONAL THERAPY:Discontinuation Summary 22   ICD-10: Treatment Diagnosis:  Generalized edema (R60.1)  Lymphedema, not elsewhere classified (I89.0)  Precautions/Allergies:   Patient has no known allergies. TREATMENT PLAN:  Effective Dates: 10/18/2021 TO 2022 (90 days). Frequency/Duration: 2 times a week for 90 Day(s) MEDICAL/REFERRING DIAGNOSIS:  Lymphedema [I89.0]   DATE OF ONSET: constant swelling since last November  REFERRING PHYSICIAN: Bruna Lynn MD MD Orders: Evaluate and treat  Return MD Appointment: unknown   Discontinuation Summary: Ms. Barb Morataya attended her initial evaluation and one treatment session. She cancelled her next session due to overall body swelling for which her daughter was taking her to the ED for assessment. She did not return to therapy; plan of care will be discontinued. Thank you for the opportunity to participate in her care. INITIAL ASSESSMENT:  Ms. Barb Morataya presents with edema in her LEs and abdomen that is consistent with stage 3 lymphedema and is complicated by frequent infections requiring antibiotics as well as pain that is limiting her ability to complete ADLs and iADLs safely and independently. Feel she may benefit from skilled occupational therapy to maximize independence with home management of lymphedema. PROBLEM LIST (Impacting functional limitations):  1. Decreased Strength  2. Decreased ADL/Functional Activities  3. Increased Pain  4. Edema/Girth  5. Decreased Skin Integrity/Hygeine INTERVENTIONS PLANNED: (Treatment may consist of any combination of the following)  1. Activities of daily living training  2. Adaptive equipment training  3. Manual therapy training  4. Modalities  5.  Therapeutic activity  6. Therapeutic exercise     GOALS: (Goals have been discussed and agreed upon with patient.) Unable to assess at time of discharge. Short-Term Functional Goals: Time Frame: 45 days  1. The patient/caregiver will verbalize understanding of lymphedema precautions. 2. Patient will be independent with skin care regimen. 3. The patient/caregiver will be independent at donning and doffing bilateral lower extremity compression bandages. 4. The patient/caregiver will be independent with self-manual lymph drainage techniques and show decrease in limb volume. 5. Patient will be independent in lymphatic exercises. Discharge Goals: Time Frame: 90 days  1. Patient's bilateral lower extremity circumferential measurements will decrease on volumetric graph by 1-3 cm to maximize functional use in ADL's.   2. The patient/caregiver will be independent with home management of lymphedema. 3. Patient/caregiver will be independent donning and doffing bilateral lower extremity compression garment. OUTCOME MEASURE:   Tool Used: Lymphedema Life Impact Scale   Score:  Initial: 63 Most Recent: X (Date: -- )   Interpretation of Score: The Lymphedema Life Impact Scale (LLIS) is a validated instrument that measures the physical, functional, and psychosocial concerns pertinent to patients with extremity lymphedema. The Scale's questionnaire is administered to patients to gauge impairments, activity limitations, and participation restrictions resulting from their lymphedema. Thank you for this referral,  Abdoul Linton OT     Referring Physician Signature: Buddy Palmer MD No Signature is Required for this note.
